# Patient Record
Sex: MALE
[De-identification: names, ages, dates, MRNs, and addresses within clinical notes are randomized per-mention and may not be internally consistent; named-entity substitution may affect disease eponyms.]

---

## 2017-08-30 ENCOUNTER — HOSPITAL ENCOUNTER (INPATIENT)
Dept: HOSPITAL 14 - H.ER | Age: 57
LOS: 6 days | Discharge: HOME | DRG: 100 | End: 2017-09-05
Attending: INTERNAL MEDICINE | Admitting: INTERNAL MEDICINE
Payer: MEDICAID

## 2017-08-30 VITALS — BODY MASS INDEX: 20.1 KG/M2

## 2017-08-30 DIAGNOSIS — E87.1: ICD-10-CM

## 2017-08-30 DIAGNOSIS — I10: ICD-10-CM

## 2017-08-30 DIAGNOSIS — Z23: ICD-10-CM

## 2017-08-30 DIAGNOSIS — Z78.1: ICD-10-CM

## 2017-08-30 DIAGNOSIS — F17.200: ICD-10-CM

## 2017-08-30 DIAGNOSIS — D69.59: ICD-10-CM

## 2017-08-30 DIAGNOSIS — F10.239: ICD-10-CM

## 2017-08-30 DIAGNOSIS — G93.41: ICD-10-CM

## 2017-08-30 DIAGNOSIS — G40.409: Primary | ICD-10-CM

## 2017-08-30 DIAGNOSIS — J32.0: ICD-10-CM

## 2017-08-30 DIAGNOSIS — J32.2: ICD-10-CM

## 2017-08-30 LAB
ALBUMIN/GLOB SERPL: 1.1 {RATIO} (ref 1–2.1)
ALP SERPL-CCNC: 83 U/L (ref 38–126)
ALT SERPL-CCNC: 197 U/L (ref 21–72)
APTT BLD: 31.4 SECONDS (ref 25.6–37.1)
ARTERIAL PATENCY WRIST A: YES
AST SERPL-CCNC: 221 U/L (ref 17–59)
BASOPHILS # BLD AUTO: 0 K/UL (ref 0–0.2)
BASOPHILS NFR BLD: 0.6 % (ref 0–2)
BILIRUB SERPL-MCNC: 0.8 MG/DL (ref 0.2–1.3)
BUN SERPL-MCNC: 9 MG/DL (ref 9–20)
CALCIUM SERPL-MCNC: 9.6 MG/DL (ref 8.4–10.2)
CHLORIDE SERPL-SCNC: 98 MMOL/L (ref 98–107)
CHOLEST SERPL-MCNC: 250 MG/DL (ref 0–199)
CO2 SERPL-SCNC: 12 MMOL/L (ref 22–30)
EOSINOPHIL # BLD AUTO: 0 K/UL (ref 0–0.7)
EOSINOPHIL NFR BLD: 0.3 % (ref 0–4)
ERYTHROCYTE [DISTWIDTH] IN BLOOD BY AUTOMATED COUNT: 13.8 % (ref 11.5–14.5)
ETHANOL SERPL-MCNC: < 10 MG/DL (ref 0–10)
GLOBULIN SER-MCNC: 4.7 GM/DL (ref 2.2–3.9)
GLUCOSE SERPL-MCNC: 283 MG/DL (ref 75–110)
HCO3 BLDA-SCNC: 22.4 MMOL/L (ref 21–28)
HCT VFR BLD CALC: 45.7 % (ref 35–51)
LYMPHOCYTES # BLD AUTO: 1.5 K/UL (ref 1–4.3)
LYMPHOCYTES NFR BLD AUTO: 20.7 % (ref 20–40)
MAGNESIUM SERPL-MCNC: 1.9 MG/DL (ref 1.6–2.3)
MCH RBC QN AUTO: 29.1 PG (ref 27–31)
MCHC RBC AUTO-ENTMCNC: 32.1 G/DL (ref 33–37)
MCV RBC AUTO: 90.5 FL (ref 80–94)
MONOCYTES # BLD: 0.6 K/UL (ref 0–0.8)
MONOCYTES NFR BLD: 8 % (ref 0–10)
NEUTROPHILS # BLD: 5.3 K/UL (ref 1.8–7)
NEUTROPHILS NFR BLD AUTO: 70.4 % (ref 50–75)
NRBC BLD AUTO-RTO: 0 % (ref 0–0)
PH BLDA: 7.29 [PH] (ref 7.35–7.45)
PHOSPHATE SERPL-MCNC: 4.4 MG/DL (ref 2.5–4.5)
PLATELET # BLD: 85 K/UL (ref 130–400)
PMV BLD AUTO: 10.7 FL (ref 7.2–11.7)
PO2 BLDA: 82 MM/HG (ref 80–100)
POTASSIUM SERPL-SCNC: 4 MMOL/L (ref 3.6–5)
PROT SERPL-MCNC: 9.9 G/DL (ref 6.3–8.2)
SODIUM SERPL-SCNC: 136 MMOL/L (ref 132–148)
WBC # BLD AUTO: 7.5 K/UL (ref 4.8–10.8)

## 2017-08-30 RX ADMIN — POTASSIUM CHLORIDE, DEXTROSE MONOHYDRATE AND SODIUM CHLORIDE SCH MLS/HR: 150; 5; 450 INJECTION, SOLUTION INTRAVENOUS at 13:00

## 2017-08-30 RX ADMIN — POTASSIUM CHLORIDE, DEXTROSE MONOHYDRATE AND SODIUM CHLORIDE SCH MLS/HR: 150; 5; 450 INJECTION, SOLUTION INTRAVENOUS at 21:44

## 2017-08-30 NOTE — ED PDOC
HPI:STROKE





- Time


Time: 08:45





- Historian


Historian: EMS





- Chief Complaint


Chief Complaint: Unresponsive





- Onset


Date: 08/30/17


Time: 08:00





- Timing


Timing: Currently Symptomatic





- TPA


Positive for Contraindication: Yes


Reason tPA is not being Administered: unknown time of onset and had seizure





- Notes:


Notes:: 





58 y/o male presents to the emergency department via EMS after found not 

verbally communicative at train station prior to arrival. Limited history 

because patient is not communicating. Patient had seizure while evaluating 

patient, all extremities were shaking and stiffening up, lasted approximately 1 

minute.  Ativan 2mg IV given.  Unclear time of onset when pt. began being 

nonverbal.  





NIHSS Stroke Scale





- Date/Time Evaluation Performed


Date Performed: 08/30/17


Time Performed: 08:42


When Was NIHSS Performed: Baseline





- How Severe is the Stroke


Level of Consciousness: 2=Obtunded


LOC to Questions: 2=Neither correct


LOC to commands: 2=Neither correct


Best Gaze: 2=Forced deviation


Visual: 3=Bilateral


Facial: 0=Normal


Motor Arm - Left: 3=No effort against gravity (falls immediately)


Motor Arm - Right: 3=No effort against gravity (falls immediately)


Motor Leg - Left: 3=No effort against gravity (falls immediately)


Motor Leg - Right: 3=No effort against gravity (falls immediately)


Limb Ataxia: 2=Present both


Sensory: 2=Severe to total loss


Best Language: 3=Mute


Dysarthia: 2=Severe, near unintelligible or worse


Extinction & Inattention (Neglect): 2=Profound neglect(does not recognize own 

hand or orients to one side)


Score: 34





rTPA Inclusion/Exclusion





- Refusal of Treatment


Patient Refused Treatment: No





- Inclusion Criteria for Altepase


Patient is 18 years or Older: Yes


The Clinical Diagnosis of Ischemic Stroke That is Causing a Potentially 

Disabling Neurological Deficit: No


Time of Onset is Well Established to be Less Than 270 Minute Before Treatment 

Would Begin: No


Risk/Benefit Discussed With Patient/Family Member Present: No





- Exclusion Criteria for Altepase


Uncontrolled Hypertension at Time of Treatment (Systolic BP above 185 or 

Diastolic BP above 110 mmHg): Yes





Past Medical History


Reviewed: Historical Data, Nursing Documentation, Vital Signs


Vital Signs: 





 Last Vital Signs











Temp  97.9 F   08/30/17 08:34


 


Pulse  131 H  08/30/17 08:34


 


Resp  20   08/30/17 08:34


 


BP  223/146 H  08/30/17 08:34


 


Pulse Ox  96   08/30/17 08:34














- Family History


Family History: States: Unknown Family Hx





- Home Medications


Home Medications: 


 Ambulatory Orders











 Medication  Instructions  Recorded


 


Unobtainable  08/30/17














- Allergies


Allergies/Adverse Reactions: 


 Allergies











Allergy/AdvReac Type Severity Reaction Status Date / Time


 


Unobtainable Allergy   Verified 08/30/17 08:52














Review of Systems


Review Of Systems: ROS cannot be obtained secondary to pt's inabilty to answer 

questions. (Limited due to patient not verbally communicating)





Physical Exam





- Reviewed


Nursing Documentation Reviewed: Yes


Vital Signs Reviewed: Yes (Limited due to nonverbal communicative male status 

post STROKE)





- Physical Exam


Appears: Positive for: Uncomfortable


Head Exam: Positive for: ATRAUMATIC, NORMAL INSPECTION, NORMOCEPHALIC


Skin: Positive for: Normal Color, Warm, Dry


Eye Exam: Positive for: PERRL, Other (Gaze towards left side)


ENT: Positive for: Normal ENT Inspection


Neck: Positive for: Normal


Cardiovascular/Chest: Positive for: Tachycardia (with regular rhythm).  

Negative for: Murmur


Respiratory: Positive for: Normal Breath Sounds.  Negative for: Accessory 

Muscle Use, Wheezing, Respiratory Distress


Gastrointestinal/Abdominal: Positive for: Soft


Back: Positive for: Normal Inspection


Extremity: Positive for: Other (Not moving extremities on command.  Ocassional 

movement of extrm on his own will but not to command. ).  Negative for: Normal 

ROM


Neurologic/Psych: Positive for: Other (limited; pt. nonverbal and not 

communicating.  )





- Laboratory Results


Result Diagrams: 


 08/30/17 08:50





 08/30/17 08:50


Interpretation Of Abn Labs: CO2 12





- ECG


ECG: Positive for: Interpreted By Me, Viewed By Me


ECG Rhythm: Positive for: Sinus Tachycardia.  Negative for: ST/T Changes


O2 Sat by Pulse Oximetry: 96 (RA)


Pulse Ox Interpretation: Normal





- Radiology


X-Ray: Read By Radiologist


X-Ray Interpretation: Fracture (chronic 5 and 6 rib)





- CT Scan/US


  ** head


Other Rad Studies (CT/US): Read By Radiologist


Other Rad Interpretation: no acute





- Progress


ED Course And Treament: 





956:  Spoke with Dr. Browne neurology.  Wants pt. to be put on keppra with a 

loading dose likely seizure.  He was made aware of all findings and 

presentation. 





1046:  Stable.  Had another seizure, given ativan 2mg.  Spoke with Dr. Campos.  

Will admit tele obs.  Keppra loading pending medication retrieval from 

pharmacy. 





- Critical Care


Total Time (In Min): 30


Documented Critical Care: Time excludes all time spent performint seperately 

billable procedures





Medical Decision Making


Medical Decision Making: 





Time: 08:45


Initial impression: Stroke 


Initial plan:


--Patient had seizure while evaluating patient and given Ativan 2 mg 


--Type and Screen


--Head CT


--EKG


--CMP


--Hemoglobin A1C


--Lipid Panel


--Troponin I 


--CBC w/ diff


--PTT & Prothrombin


--Chest x-ray


--Reevaluation





Time: 09:12


--Chest x-ray read by me and show no acute findings. 





--Head CT


FINDINGS:





HEMORRHAGE:


No intracranial hemorrhage. 





BRAIN:


No mass effect or edema.  No atrophy or chronic microvascular ischemic changes.





VENTRICLES:


Unremarkable. No hydrocephalus. 





CALVARIUM:


Unremarkable.





PARANASAL SINUSES:


Bilateral maxillary and ethmoidal air cell mucosal thickening with probable 

concomitant maxillary sinus retention cysts are present.  Findings suggest 

chronic sinusitis





MASTOID AIR CELLS:


Unremarkable as visualized. No inflammatory changes.





OTHER FINDINGS:


None.





IMPRESSION:


No intracranial hemorrhage or mass effect. No gross infarct appreciated. 





Maxillary and ethmoidal chronic sinusitis changes








Time: 09:30


--Chest x-ray


FINDINGS:





LUNGS:


No active pulmonary disease. Bilateral lung volume hyperinflation. 





PLEURA:


No significant pleural effusion identified, no pneumothorax apparent.





CARDIOVASCULAR:


Normal.





OSSEOUS STRUCTURES:


Thoracic spondylosisMild bulbous right 5th and 6th anterolateral rib superior 

cortices - subacute to chronic rib fractures here probable 





VISUALIZED UPPER ABDOMEN:


Normal.





OTHER FINDINGS:


None.





IMPRESSION:


No active disease. Probable subacute to chronic right anterolateral 5th and 6th 

rib fractures -superior cortices only.








Time: 09:51


--ABG Shock Panel


--Keppra 750 mg/100 mL 











Scribe Attestation:


Documented by Aimee Farrell, acting as a scribe for Bartolome Landry MD.





Provider Scribe Attestation:


All medical record entries made by the Scribe were at my direction and 

personally dictated by me. I have reviewed the chart and agree that the record 

accurately reflects my personal performance of the history, physical exam, 

medical decision making, and the department course for this patient. I have 

also personally directed, reviewed, and agree with the discharge instructions 

and disposition.





Disposition





- Clinical Impression


Clinical Impression: 


 Generalized seizure








- Patient ED Disposition


Is Patient to be Admitted: Yes





- Disposition


Disposition Time: 09:48


Condition: SERIOUS





- Pt Status Changed To:


Hospital Disposition Of: Observation





- POA


Present On Arrival: None

## 2017-08-30 NOTE — CT
PROCEDURE:  CT HEAD WITHOUT CONTRAST.



HISTORY:

code stroke. Patient had seizure in ER 



COMPARISON:

None available. 



TECHNIQUE:

Axial computed tomography images were obtained through the head/brain 

without intravenous contrast.  



Radiation dose:



Total exam DLP = 1206 mGy-cm.



This CT exam was performed using one or more of the following dose 

reduction techniques: Automated exposure control, adjustment of the 

mA and/or kV according to patient size, and/or use of iterative 

reconstruction technique.



FINDINGS:



HEMORRHAGE:

No intracranial hemorrhage. 



BRAIN:

No mass effect or edema.  No atrophy or chronic microvascular 

ischemic changes.



VENTRICLES:

Unremarkable. No hydrocephalus. 



CALVARIUM:

Unremarkable.



PARANASAL SINUSES:

Bilateral maxillary and ethmoidal air cell mucosal thickening with 

probable concomitant maxillary sinus retention cysts are present.  

Findings suggest chronic sinusitis



MASTOID AIR CELLS:

Unremarkable as visualized. No inflammatory changes.



OTHER FINDINGS:

None.



IMPRESSION:

No intracranial hemorrhage or mass effect. No gross infarct 

appreciated. 



Maxillary and ethmoidal chronic sinusitis changes

## 2017-08-30 NOTE — CARD
--------------- APPROVED REPORT --------------





EKG Measurement

Heart [id number]

[place] [id number]

[id number]

[id number]

[id number]



<Conclusion>

Sinus tachycardia

Possible Left atrial enlargement

Left ventricular hypertrophy with repolarization abnormality

Abnormal ECG

## 2017-08-30 NOTE — CP.PCM.HP
History of Present Illness





- History of Present Illness


History of Present Illness: 





CC: unresponsive





HPI: 


57 year old male [place] train station brought into ER, had 

multiple subsequent seizures, was given Ativan and Keppra.  Unknown patient 

medical history, none available on record. Patient continues to be responsive 

to painful stimuli, follows commands, but not verbally responsive at this time. 

Discussed with ER physician and Neurologist, Keppra initiated, for Hypertension 

pt receiving Hydralazine IV around the clock. HD stable, monitor on telemetry. 





ROS: per HPI all other systems reviewed and negative by me





PMSH: unable to obtain at this time


FH/SH/MEDS/ALLERGIES: unobtainable











Present on Admission





- Present on Admission


Any Indicators Present on Admission: No





Past Patient History





- Past Social History


Smoking Status: Unknown If Ever Smoked





- CARDIAC


Hx Cardiac Disorders:  (unable to obtain hx)





- PSYCHIATRIC


Hx Substance Use: No





- ANESTHESIA


Hx Anesthesia: No





Meds


Allergies/Adverse Reactions: 


 Allergies











Allergy/AdvReac Type Severity Reaction Status Date / Time


 


Unobtainable Allergy   Verified 08/30/17 08:52














Physical Exam





- Constitutional


Appears: Non-toxic, No Acute Distress





- Head Exam


Head Exam: ATRAUMATIC, NORMOCEPHALIC





- Eye Exam


Eye Exam: EOMI, PERRL


Pupil Exam: NORMAL ACCOMODATION





- ENT Exam


ENT Exam: Mucous Membranes Moist, Normal Oropharynx





- Respiratory Exam


Respiratory Exam: Clear to Auscultation Bilateral, NORMAL BREATHING PATTERN





- Cardiovascular Exam


Cardiovascular Exam: RRR, +S1, +S2





- GI/Abdominal Exam


GI & Abdominal Exam: Normal Bowel Sounds, Soft.  absent: Mass, Organomegaly





- Extremities Exam


Extremities exam: Positive for: normal capillary refill, pedal pulses present





- Back Exam


Back exam: NORMAL INSPECTION.  absent: rash noted





- Neurological Exam


Neurological exam: Altered, Reflexes Normal





- Psychiatric Exam


Additional comments: 





unobtainable








- Skin


Skin Exam: Dry, Normal Color, Warm





Results





- Vital Signs


Recent Vital Signs: 





 Last Vital Signs











Temp  99.3 F   08/30/17 11:30


 


Pulse  87   08/30/17 12:53


 


Resp  17   08/30/17 12:53


 


BP  174/80 H  08/30/17 12:53


 


Pulse Ox  96   08/30/17 12:40














- Labs


Result Diagrams: 


 08/30/17 08:50





 08/30/17 08:50





Assessment & Plan





- Assessment and Plan (Free Text)


Plan: 





57 year old male [place] train station brought into ER, had 

multiple subsequent seizures, was given Ativan and Keppra.  Unknown patient 

medical history, none available on record. Patient continues to be responsive 

to painful stimuli, follows commands, but not verbally responsive at this time. 

Discussed with ER physician and Neurologist, Keppra initiated, for Hypertension 

pt receiving Hydralazine IV around the clock. HD stable, monitor on telemetry. 





SEIZURES


   Neurology consult: Dr. Browne


   CT head neg for bleed


   Keppra 500 mg IV Q12


   Monitor on Tele


   MRI and EEG when patient more awake/alert


   Consider alcoholism for AST > ALT, 


   NPO for now


   Maintenance fluids


   +guardado


   Swallow eval pending





HTN


   Hypertensive


   continue Hydralazine

## 2017-08-30 NOTE — RAD
HISTORY:

code stroke  



COMPARISON:

No prior. 



FINDINGS:



LUNGS:

No active pulmonary disease. Bilateral lung volume hyperinflation. 



PLEURA:

No significant pleural effusion identified, no pneumothorax apparent.



CARDIOVASCULAR:

Normal.



OSSEOUS STRUCTURES:

Thoracic spondylosisMild bulbous right 5th and 6th anterolateral rib 

superior cortices - subacute to chronic rib fractures here probable 



VISUALIZED UPPER ABDOMEN:

Normal.



OTHER FINDINGS:

None.



IMPRESSION:

No active disease. Probable subacute to chronic right anterolateral 

5th and 6th rib fractures -superior cortices only.

## 2017-08-30 NOTE — CON
DATE:



NEUROLOGY CONSULTATION



REASON FOR CONSULTATION:  Seizures.



HISTORY OF PRESENTING ILLNESS:  The patient is a 57-year-old male who has

been asked for evaluation of seizure.  Patient was found in the train

station confused, not verbally communicative.  Patient while at the

emergency room, had a generalized tonic-clonic seizure.  Patient was given

2 mg of Ativan.  The patient is unable to give history as he is still

sleepy and waking up.  Patient was given Keppra in the emergency room.



REVIEW OF SYSTEM:  Denies any headache, dizziness, chest pain, palpitation,

constipation, diarrhea.



PAST MEDICAL HISTORY:  Unknown.



MEDICATIONS AT HOME:  Unknown.



ALLERGIES:  Unknown.



SOCIAL HISTORY:  Unknown.



FAMILY HISTORY:  Unknown.



PHYSICAL EXAMINATION

GENERAL:  The patient is a middle-aged male lying on the bed in no acute

distress.

VITAL SIGNS:  His blood pressure is 174/80, heart rate is 87 per minute,

breathing at the rate of 16 per minute, temperature is 99.3 degrees

Fahrenheit.

HEENT:   Head is normocephalic and atraumatic.

NECK:  Supple.  There are no carotid bruits.

LUNGS:  Clear.

CARDIOVASCULAR:  S1 and S2 audible.  No murmurs.

ABDOMEN:  Soft and nontender.  Bowel sounds present.

NEUROLOGICAL:  Mental status; patient is sleepy, but arousable.  He follows

simple commands.  He is not verbalizing.  Cranial nerve examination; pupils

are 3 mm bilaterally, reactive to light.  Visual fields are full. 

Extraocular movements are intact.  There is no facial asymmetry.  He is

moving all 4 extremities symmetrically.  Reflexes are 1+ and symmetrical. 

Plantars are downgoing bilaterally.  Gait is deferred at the moment.



LABORATORY DATA:  Labs reviewed shows WBC 7.5, hemoglobin 14.7, hematocrit

45.7 and platelets of 85,000.  His INR is 1.1.  Sodium is 136, potassium

4.0, chloride of 98, carbon dioxide 112, BUN of 9, creatinine of 0.8, and

glucose of 283.  His urine toxicology screen is negative.



IMPRESSION:  New onset of seizures.



RECOMMENDATION:

1.  Patient to have MRI of the brain without contrast.

2.  Patient also to have an electroencephalogram.

3.  Patient to be continued on IV Keppra 500 mg every 12 hours and once he

is more awake, to be switched to p.o. Keppra.

4.  Patient had a CT scan of the head done, which showed no intracranial

hemorrhage or mass effect.  No gross infarct appreciated.

5.  Continue with supportive care and seizure precautions.



Thank you for the opportunity to participate in the care of this patient.





__________________________________________

Perla Browne MD





DD:  08/30/2017 13:16:56

DT:  08/30/2017 20:15:35

Job # [id number]

## 2017-08-31 LAB
BUN SERPL-MCNC: 11 MG/DL (ref 9–20)
CALCIUM SERPL-MCNC: 9.5 MG/DL (ref 8.4–10.2)
CHLORIDE SERPL-SCNC: 97 MMOL/L (ref 98–107)
CO2 SERPL-SCNC: 22 MMOL/L (ref 22–30)
ERYTHROCYTE [DISTWIDTH] IN BLOOD BY AUTOMATED COUNT: 13.6 % (ref 11.5–14.5)
GLUCOSE SERPL-MCNC: 149 MG/DL (ref 75–110)
HCT VFR BLD CALC: 41.5 % (ref 35–51)
MCH RBC QN AUTO: 29.3 PG (ref 27–31)
MCHC RBC AUTO-ENTMCNC: 33.3 G/DL (ref 33–37)
MCV RBC AUTO: 88 FL (ref 80–94)
PLATELET # BLD: 79 K/UL (ref 130–400)
POTASSIUM SERPL-SCNC: 4 MMOL/L (ref 3.6–5)
SODIUM SERPL-SCNC: 130 MMOL/L (ref 132–148)
WBC # BLD AUTO: 8.5 K/UL (ref 4.8–10.8)

## 2017-08-31 RX ADMIN — DEXMEDETOMIDINE HYDROCHLORIDE ONE MLS/HR: 100 INJECTION, SOLUTION, CONCENTRATE INTRAVENOUS at 08:17

## 2017-08-31 RX ADMIN — DEXMEDETOMIDINE HYDROCHLORIDE ONE MLS/HR: 100 INJECTION, SOLUTION, CONCENTRATE INTRAVENOUS at 20:10

## 2017-08-31 RX ADMIN — ENOXAPARIN SODIUM SCH MG: 40 INJECTION SUBCUTANEOUS at 09:17

## 2017-08-31 RX ADMIN — POTASSIUM CHLORIDE, DEXTROSE MONOHYDRATE AND SODIUM CHLORIDE SCH MLS/HR: 150; 5; 450 INJECTION, SOLUTION INTRAVENOUS at 05:45

## 2017-08-31 NOTE — CP.PCM.PN
Subjective





- Date & Time of Evaluation


Date of Evaluation: 08/31/17


Time of Evaluation: 07:00





- Subjective


Subjective: 


Patient was seen and evaluated bedside. Appears to be restless , confused  with 

tremors.


As per records patient had episodes of agitation and disorientation  overnight 

requiring medical sedation with haldol and ativan with minimal benefit.


At present on 1;1 observation for safety , difficult to redirect.


As per daughter patient has long history of ETOH abuse  


/73 HR 85, afebrile , saturating 100 % in RA 


Na 130 K 4 Hgb 13 plt 79 k WBC 8.5











Objective





- Vital Signs/Intake and Output


Vital Signs (last 24 hours): 


 











Temp Pulse Resp BP Pulse Ox


 


 98.6 F   85   22   168/73 H  99 


 


 08/31/17 05:00  08/31/17 06:00  08/31/17 06:00  08/31/17 06:00  08/30/17 20:00








Intake and Output: 


 











 08/31/17 08/31/17





 06:59 18:59


 


Intake Total 1350 


 


Output Total 600 


 


Balance 750 














- Medications


Medications: 


 Current Medications





Enoxaparin Sodium (Lovenox)  40 mg SC DAILY GOLDEN


   PRN Reason: Protocol


Folic Acid (Folic Acid)  1 mg PO DAILY Replaced by [facility]


Hydralazine HCl (Apresoline)  20 mg IV Q6 Replaced by [facility]


   Last Admin: 08/31/17 04:09 Dose:  20 mg


Levetiracetam 500 mg/ Sodium (Chloride)  105 mls @ 210 mls/hr IVPB Q12 GOLDEN


   Last Admin: 08/30/17 21:42 Dose:  210 mls/hr


Multivitamins/Vitamin C 10 ml/Thiamine HCl 100 mg/ Folic Acid 1 mg/ Sodium 

Chloride  1,011.2 mls @ 175 mls/hr IV .Q5H47M ONE


   Stop: 08/31/17 13:31


Dexmedetomidine HCl 400 mcg/ (Sodium Chloride)  100 mls @ 3 mls/hr IV .Q24H ONE

; 0.2 MCG/KG/HR


   PRN Reason: Protocol


   Stop: 08/31/17 07:45


Sodium Chloride (Sodium Chloride 0.9%)  1,000 mls @ 175 mls/hr IV .Q5H43M Replaced by [facility]


   Stop: 09/01/17 07:46


Lorazepam (Ativan)  1 mg PO Q3 PRN


   PRN Reason: withdrawal symptoms


   Last Admin: 08/31/17 00:19 Dose:  1 mg


Thiamine HCl (Vitamin B1 Tab)  100 mg PO DAILY GOLDEN











- Labs


Labs: 


 





 08/31/17 05:00 





 08/31/17 05:00 





 











PT  11.3 Seconds (9.8-13.1)   08/30/17  08:50    


 


INR  1.1  (0.9-1.2)   08/30/17  08:50    


 


APTT  31.4 Seconds (25.6-37.1)   08/30/17  08:50    














- Constitutional


Appears: Combative (restless), Confused, Other





- Head Exam


Head Exam: ATRAUMATIC, NORMAL INSPECTION, NORMOCEPHALIC





- Eye Exam


Eye Exam: EOMI, Normal appearance, PERRL


Pupil Exam: NORMAL ACCOMODATION





- ENT Exam


ENT Exam: Mucous Membranes Moist, Normal Exam





- Neck Exam


Neck Exam: Normal Inspection





- Respiratory Exam


Respiratory Exam: Clear to Ausculation Bilateral, NORMAL BREATHING PATTERN.  

absent: Rales, Rhonchi, Wheezes





- Cardiovascular Exam


Cardiovascular Exam: REGULAR RHYTHM, RRR, +S1, +S2.  absent: JVD





- GI/Abdominal Exam


GI & Abdominal Exam: Soft, Normal Bowel Sounds.  absent: Distended, Guarding, 

Tenderness, Rebound





- Rectal Exam


Rectal Exam: Deferred





- Extremities Exam


Extremities Exam: Full ROM, Normal Capillary Refill, Normal Inspection.  absent

: Calf Tenderness, Pedal Edema





- Back Exam


Back Exam: NORMAL INSPECTION





- Neurological Exam


Neurological Exam: Alert, Awake


Additional comments: 





confused , restless, agitated 





- Psychiatric Exam


Psychiatric exam: Agitated, Anxious





- Skin


Skin Exam: Dry, Normal Color, Warm





Assessment and Plan





- Assessment and Plan (Free Text)


Assessment: 


57 year old male [place] train [place] brought into ER, had 

multiple subsequent seizures, was given Ativan and Keppra.  As per family 

patient has history of ETOH abuse. Initially code stroke was called and CT head 

showed no acute pathology. Neurology was consulted . Patient became very 

agitated , restless and confused overnight, placed on 1:1 for safety and given 

haldol and Ativan for possible DT-s  . transferred to ICU for close monitoring 

and started on presedex 





1. ETOH abuse and withdrawal  / impending DT-s / metabolic encephalopathy 


As per family patient has long history of ETOh abuse


Restless,confused and tremulous most likely secondary to ETOH withdrawal


ETOH levels 0, UDOA negative


CT head showed no acute pathology


Seizure / withdrawal precautions


Transferred to ICU for close monitoring and started Presedex drip and Arivan PRN


Keep 1:1 for safety


Continue Thiamine, folic acid , MVI 


Neurology consult on board 


EEG showed no seizure activity . will follow up MRI brain 








 2.New onset seizure 


Most likely related to ETOH withdrawal 


CT head showed no acute pathology


EEG showed no  seizure like activity


continue MVI/Thiamine/ folic acid / IVF


Seziure precautions


Continue Keppra and Ativan PRN


Follow up MRI


Neurology on consult


Will start Pt as soon as stable








3.Nicotine addiction


started nicotine patch





4.HTN


uncontrolled 


Continue hydralazine IV 





5. Transaminitis


Most likely secondary to ETOH abuse 








6. DVt prophylaxis


SCD and lovenox

## 2017-08-31 NOTE — PN
NEUROLOGY PROGRESS NOTE



DATE:



SUBJECTIVE:  The patient is lying on the bed in no acute distress.



PHYSICAL EXAMINATION:

VITAL SIGNS:  Blood pressure is 150/79, heart rate is 93 per minute,

beating at the rate of 16 per minute, and temperature is 98.5 degrees

Fahrenheit.

HEENT:  Head is normocephalic and atraumatic.

NECK:  Supple.  There are no carotid bruits.

LUNGS:  Clear.

CARDIOVASCULAR:  S1 and S2 audible.  No murmurs.

ABDOMEN:  Soft and nontender.  Bowel sounds are present.

NEUROLOGIC:  Mini-mental status; the patient is sleepy, but easily

arousable.  He follows some simple commands.  He knows he is in the

hospital.  He does not know the name of the president.  Cranial nerve

examination; pupils are 3 mm bilaterally reactive to light.  Visual fields

are full.  Extraocular movements are intact.  There is no facial asymmetry.

He is moving all 4 extremities symmetrically.  Plantars are downgoing

bilaterally.



LABORATORY DATA:  Reviewed and shows sodium is 130, potassium is 4.0,

chloride is 97, carbon dioxide content of 22, BUN 11, creatinine 0.5, and

glucose of 149.  His WBC is 8.5, hemoglobin 13.8, hematocrit 41.5, and

platelets of 79.



IMPRESSION:  New onset of seizure questionable secondary to alcohol

withdrawal.



RECOMMENDATIONS:

1.  Patient is going to have MRI of the brain without contrast.

2.  Patient had an electroencephalogram done, which shows no epileptogenic

activity.

3.  Patient to be continued on Keppra.

4.  Patient is on lorazepam for possible alcohol withdrawal, which may be

continued.

5.  I agree with thiamine and multivitamin.

6.  Please continue supportive care and other treatment.





















Thank you for the opportunity to participate in the care of this patient.





__________________________________________

Perla Browne MD





DD:  08/31/2017 10:22:25

DT:  08/31/2017 11:13:40

Job # [id number]

## 2017-08-31 NOTE — CP.CCUPN
CCU Subjective





- Physician Review


Subjective (Free Text): 


57M upgraded to ICU this AM from telemetry bed for agitation, disorientation 

and probable ETOH withdrawal syndrome. Admitted yesterday after post Code 

Stroke eval with negative CT Brain, found to have had seizure activity and 

accelerated HTN, started on IV Keppra and ATC IV Hydralazine. Overnight, he was 

placed on 1:1 supervision for agitative episodes with minimal response to PRN 

Haldol. Appears disoriented now and asking to go to hospital, alternating 

speaking in both Citizen of [place] and English. Otherwise in no acute distress, does not 

appear cyanotic, diaphoretic, not hypoxemic. No recurrent seizure activity 

reported by nursing. Evangelista in place but showing bloody urine. 





ROS:  unobtainable 2 agitation and disorientation. 





PMSFH:  All Nursing and physician documentation reviewed to date; no new 

pertinent info noted relevant to current medical problems.





MAJOR PROBLEMS:


1.   Seizure Disorder 2 ETOH withdrawal


2.   Chronic Alcoholism


3.   Accelerated HTN


4.    Hyponatremia








PLAN:


1.   No effective sedation accomplished with Haldol or BZDPs, will trial 

Dexmedetomidine. 


2.   Airway reflexes intact, no need for any assisted breathing support.


3.   Increase IVF hydration with NSS, banana bag. Follow serial serum Na 

levels. 


4.   1:1 supervision


5.   Thiamine / folate supplements.


6.   Repeat CT Brain if neuro status worsens.


7.   Ongoing Keppra. 


8.   Need more medical database information if obtainable, no family noted as 

of yet. 





CCU Objective





- Vital Signs / Intake & Output


Vital Signs (Last 4 hours): 





afebrile, no fever spikes, -170, HR 79, RR 22, SPO2 98% on RA.





- Physical Exam


Physical Exam Limitations: Positive for: Altered Mental Status


Head: Positive for: Atraumatic, Normocephalic


Pupils: Positive for: PERRL


Extroacular Muscles: Positive for: EOMI.  Negative for: Gaze Palsy


Conjunctiva: Positive for: Normal.  Negative for: Injected, Icteric


Mouth: Positive for: Moist Mucous Membranes


Neck: Positive for: Normal Range of Motion.  Negative for: JVD, Lymphadenopathy


Respiratory/Chest: Positive for: Clear to Auscultation.  Negative for: 

Accessory Muscle Use, Wheezes


Cardiovascular: Positive for: Regular Rate and Rhythm, Tachycardic


Abdomen: Positive for: Normal Bowel Sounds.  Negative for: Tenderness, 

Distention


Lower Extremity: Positive for: Normal Inspection, NORMAL PULSES.  Negative for: 

Edema, CALF TENDERNESS, Cyanosis


Neurological: Positive for: Motor Func Grossly Intact, Normal Sensory Function


Skin: Positive for: Warm.  Negative for: Rashes


Psychiatric: Positive for: Alert, Agitated





- Medications


Active Medications: 


Active Medications











Generic Name Dose Route Start Last Admin





  Trade Name Freq  PRN Reason Stop Dose Admin


 


Enoxaparin Sodium  40 mg  08/31/17 09:00  





  Lovenox  SC   





  DAILY [facility]   





  Protocol   


 


Folic Acid  1 mg  08/31/17 09:00  





  Folic Acid  PO   





  DAILY [facility]   


 


Hydralazine HCl  20 mg  08/30/17 16:00  08/31/17 04:09





  Apresoline  IV   20 mg





  Q6 [facility]   Administration


 


Levetiracetam 500 mg/ Sodium  105 mls @ 210 mls/hr  08/30/17 21:00  08/30/17 21:

42





  Chloride  IVPB   210 mls/hr





  Q12 [facility]   Administration


 


Multivitamins/Vitamin C 10 ml/  1,011.2 mls @ 175 mls/hr  08/31/17 07:45  





Thiamine HCl 100 mg/ Folic  IV  08/31/17 13:31  





Acid 1 mg/ Sodium Chloride  .Q5H47M ONE   


 


Sodium Chloride  1,000 mls @ 175 mls/hr  08/31/17 08:00  08/31/17 08:12





  Sodium Chloride 0.9%  IV  09/01/17 07:46  175 mls/hr





  .Q5H43M [facility]   Administration


 


Dexmedetomidine HCl 400 mcg/  100 mls @ 3 mls/hr  08/31/17 08:00  08/31/17 08:17





  Sodium Chloride  IV  09/01/17 07:59  0.2 mcg/kg/hr





  .Q24H ONE   3 mls/hr





  Protocol   Administration





  0.2 MCG/KG/HR   


 


Lorazepam  1 mg  08/30/17 18:22  08/31/17 00:19





  Ativan  PO   1 mg





  Q3 PRN   Administration





  withdrawal symptoms   


 


Thiamine HCl  100 mg  08/31/17 09:00  





  Vitamin B1 Tab  PO   





  DAILY [facility]   














- Patient Studies


Fingerstick Blood Sugar Results: 263





Review of Systems





- Review of Systems


Systems not reviewed;Unavailable: Uncooperative





Critical Care Progress Note





- Extremities/Vascular


Does the Patient have a Central Venous Catheter?: No


Does the Patient need a Central Venous Catheter?: No


Does the Patient have a Evangelista Catheter?: Yes


Does the Patient need a Evangelista Catheter?: No





- Prophylaxis GI


Prophylaxis GI: Not Indicated





- Prophylaxis DVT


Prophylaxis DVT: Lovenox

## 2017-09-01 LAB
BUN SERPL-MCNC: 11 MG/DL (ref 9–20)
CALCIUM SERPL-MCNC: 8.8 MG/DL (ref 8.4–10.2)
CHLORIDE SERPL-SCNC: 98 MMOL/L (ref 98–107)
CO2 SERPL-SCNC: 24 MMOL/L (ref 22–30)
ERYTHROCYTE [DISTWIDTH] IN BLOOD BY AUTOMATED COUNT: 13.6 % (ref 11.5–14.5)
GLUCOSE SERPL-MCNC: 116 MG/DL (ref 75–110)
HCT VFR BLD CALC: 39.8 % (ref 35–51)
MCH RBC QN AUTO: 28.9 PG (ref 27–31)
MCHC RBC AUTO-ENTMCNC: 32.4 G/DL (ref 33–37)
MCV RBC AUTO: 89.3 FL (ref 80–94)
PLATELET # BLD: 73 K/UL (ref 130–400)
POTASSIUM SERPL-SCNC: 3.8 MMOL/L (ref 3.6–5)
SODIUM SERPL-SCNC: 130 MMOL/L (ref 132–148)
WBC # BLD AUTO: 5.3 K/UL (ref 4.8–10.8)

## 2017-09-01 RX ADMIN — DEXMEDETOMIDINE HYDROCHLORIDE ONE MLS/HR: 100 INJECTION, SOLUTION, CONCENTRATE INTRAVENOUS at 04:22

## 2017-09-01 RX ADMIN — ENOXAPARIN SODIUM SCH MG: 40 INJECTION SUBCUTANEOUS at 08:56

## 2017-09-01 NOTE — CP.PCM.PN
Subjective





- Date & Time of Evaluation


Date of Evaluation: 09/01/17


Time of Evaluation: 10:30





- Subjective


Subjective: 





No fever


Pt is confused and disoriented


episodes of agitation, pulls out IV line


on wrist restraint


on 1:1 for safety 


some tremulousness


no further seizure episode on Precedex





Objective





- Vital Signs/Intake and Output


Vital Signs (last 24 hours): 


 











Temp Pulse Resp BP Pulse Ox


 


 98.9 F   54 L  19   144/72   96 


 


 09/01/17 08:07  09/01/17 05:00  09/01/17 05:00  09/01/17 05:00  09/01/17 05:00








Intake and Output: 


 











 09/01/17 09/01/17





 06:59 18:59


 


Intake Total 2348 120


 


Output Total 300 


 


Balance 2048 120














- Medications


Medications: 


 Current Medications





Enoxaparin Sodium (Lovenox)  40 mg SC DAILY [facility]


   PRN Reason: Protocol


   Last Admin: 09/01/17 08:56 Dose:  40 mg


Famotidine (Pepcid)  20 mg IVP Q12 [facility]


Folic Acid (Folic Acid)  1 mg PO DAILY [facility]


   Last Admin: 09/01/17 08:57 Dose:  1 mg


Hydralazine HCl (Apresoline)  20 mg IV Q6 [facility]


   Last Admin: 08/31/17 21:09 Dose:  20 mg


Levetiracetam 500 mg/ Sodium (Chloride)  105 mls @ 210 mls/hr IVPB Q12 [facility]


   Last Admin: 09/01/17 08:51 Dose:  210 mls/hr


Lorazepam (Ativan)  1 mg PO Q3 PRN


   PRN Reason: withdrawal symptoms


   Last Admin: 08/31/17 00:19 Dose:  1 mg


Lorazepam (Ativan)  2 mg IVP Q4 PRN


   PRN Reason: Agitation


   Last Admin: 08/31/17 13:20 Dose:  2 mg


Nicotine (Nicoderm Cq)  1 patch TD DAILY [facility]


   Last Admin: 09/01/17 08:56 Dose:  1 patch


Thiamine HCl (Vitamin B1 Tab)  100 mg PO DAILY [facility]


   Last Admin: 09/01/17 08:56 Dose:  100 mg











- Labs


Labs: 


 





 09/01/17 04:20 





 09/01/17 04:20 





 











PT  11.3 Seconds (9.8-13.1)   08/30/17  08:50    


 


INR  1.1  (0.9-1.2)   08/30/17  08:50    


 


APTT  31.4 Seconds (25.6-37.1)   08/30/17  08:50    














- Constitutional


Appears: Unkempt, Older Than Stated Age, Agitated, Confused, Chronically Ill





- Head Exam


Head Exam: NORMAL INSPECTION, NORMOCEPHALIC





- Eye Exam


Eye Exam: EOMI, Normal appearance





- ENT Exam


ENT Exam: Mucous Membranes Dry, Normal External Ear Exam





- Neck Exam


Neck Exam: Full ROM.  absent: Meningismus





- Respiratory Exam


Respiratory Exam: NORMAL BREATHING PATTERN.  absent: Respiratory Distress





- Cardiovascular Exam


Cardiovascular Exam: REGULAR RHYTHM, +S1, +S2





- GI/Abdominal Exam


GI & Abdominal Exam: Soft, Normal Bowel Sounds.  absent: Tenderness





- Extremities Exam


Extremities Exam: Normal Capillary Refill.  absent: Calf Tenderness, Pedal Edema





- Neurological Exam


Neurological Exam: Alert, Awake


Additional comments: 





confused, disoriented


moves all extremities





- Psychiatric Exam


Psychiatric exam: Agitated





- Skin


Skin Exam: Dry, Normal Color, Warm





Assessment and Plan





- Assessment and Plan (Free Text)


Assessment: 





57 [place], had 

multiple subsequent seizures, was given Ativan and Keppra.  As per family 

patient has history of ETOH abuse. Initially code stroke was called and CT head 

showed no acute pathology.  Neurology was consulted . Patient became very 

agitated , restless and confused , placed on 1:1 for safety and given haldol 

and Ativan for possible DT-s  . Transferred to ICU for close monitoring and 

started on Precedex.








1. ETOH abuse and withdrawal  / impending DT-s / metabolic encephalopathy 


As per family patient has long history of ETOh abuse


Restless,confused and tremulous most likely secondary to ETOH withdrawal


ETOH levels 0, UDOA negative


CT head showed no acute pathology


Seizure / withdrawal precautions


Transferred to ICU for close monitoring and started Precedex drip and Ativan PRN


Keep 1:1 for safety


Continue Thiamine, folic acid , MVI 


Neurology consult on board 


EEG showed no seizure activity 


MRI of brain pending - unable to do as pt agitated





 2.New onset seizure 


Most likely related to ETOH withdrawal 


CT head showed no acute pathology


EEG showed no  seizure like activity


continue MVI/Thiamine/ folic acid / IVF


Seziure precautions


Continue Keppra and Ativan PRN


Follow up MRI


Neurology on consult


Will start PT as soon as stable








3.Nicotine addiction


started nicotine patch





4.HTN


uncontrolled 


Continue hydralazine IV 





5. Transaminitis


Most likely secondary to ETOH abuse 





6. Thrombocytopenia likely BM suppression by ETOH


monitor 





7. DVt prophylaxis


SCD 


d/c Lovenox as Platelet is trending down

## 2017-09-01 NOTE — EEG
DATE:



ELECTROENCEPHALOGRAM REPORT



INTRODUCTION:  This is a digitally recorded EEG monitoring using standard

EEG montages.  Background rhythm of the EEG shows a background activity of

9 Hz to 10 Hz alpha activity in parietooccipital region.  The EEG activity

is bilaterally symmetrical and synchronized.  There is attenuation of the

background activity on eye opening.  Significant amount of myogenic

artifact noted throughout this EEG recording.  Abnormal potentials, no

spike, sharp waves, or focal plane was seen.  Photic stimulation and

hyperventilation.  Photic stimulation did not reveal any abnormality and

hyperventilation was not performed.



IMPRESSION:  A normal EEG.  Because of the presence of significant myogenic

artifact the repeat EEG suggested.  It is clinically warranted.



Thank you for the opportunity to participate in the care of this patient.





__________________________________________

Perla Browne MD





DD:  08/31/2017 9:42:10

DT:  08/31/2017 14:36:39

Job # [id number]

## 2017-09-01 NOTE — CP.CCUPN
CCU Subjective





- Physician Review


Subjective (Free Text): 


Easily awakens and has still been intermittently agitated, remains disoriented 

to place and time, denies any new discomfort, no fever spikes, less tachycardic 

today, On Precedex at 0.9 mcg/kg/hr.  No recurrent seizures. 





ROS:  unobtainable 2  disorientation. 





PMSFH:  All Nursing and physician documentation reviewed to date; no new 

pertinent info noted relevant to current medical problems.





MAJOR PROBLEMS:


1.   Seizure Disorder 2 ETOH withdrawal


2.   Chronic Alcoholism


3.   Accelerated HTN


4.    Hyponatremia








PLAN:


1.   Continue  Dexmedetomidine for another 24H. MAY INCREASE DOSE TO 1.5MCG/KG/

HR IF NECESSARY TO KEEP  AT RASS NEG 2.  Ativan added to delirium regimen for 

ETOH withdrawal symptoms.


2.   Airway reflexes intact, no need for any assisted breathing support.


3.   Mantain IVF hydration with NSS, banana bag. Follow serial serum Na levels. 


4.   1:1 supervision


5.   Thiamine / folate supplements.


6.   Repeat CT Brain if neuro status worsens.


7.   Ongoing Keppra. 


8.   Need more medical database information if obtainable, no family noted as 

of yet. 





CCU Objective





- Vital Signs / Intake & Output


Vital Signs (Last 4 hours): 


Vital Signs











  Temp Pulse Resp BP Pulse Ox


 


 09/01/17 08:07  98.9 F    


 


 09/01/17 05:00  98.2 F  54 L  19  144/72  96











Intake and Output (Last 8hrs): 


 Intake & Output











 08/31/17 09/01/17 09/01/17





 22:59 06:59 14:59


 


Intake Total 2457 2221 


 


Output Total 100 300 


 


Balance 2357 1921 


 


Intake:   


 


  IV 1877 1981 


 


  Intake, Piggyback 100  


 


  Oral 480 240 


 


Output:   


 


  Urine 100 300 


 


    Urethral (Evangelista) 100  


 


    Urine, Voided  300 














- Physical Exam


Head: Positive for: Atraumatic, Normocephalic


Pupils: Positive for: PERRL


Extroacular Muscles: Positive for: EOMI.  Negative for: Gaze Palsy


Conjunctiva: Positive for: Normal.  Negative for: Injected, Icteric


Mouth: Positive for: Moist Mucous Membranes


Neck: Positive for: Normal Range of Motion.  Negative for: JVD, Lymphadenopathy


Respiratory/Chest: Positive for: Clear to Auscultation.  Negative for: 

Accessory Muscle Use, Wheezes


Cardiovascular: Positive for: Regular Rate and Rhythm, Tachycardic


Abdomen: Positive for: Normal Bowel Sounds.  Negative for: Tenderness, 

Distention


Lower Extremity: Positive for: Normal Inspection, NORMAL PULSES.  Negative for: 

Edema, CALF TENDERNESS, Cyanosis


Neurological: Positive for: Motor Func Grossly Intact, Normal Sensory Function


Skin: Positive for: Warm.  Negative for: Rashes


Psychiatric: Positive for: Alert, Agitated





- Medications


Active Medications: 


Active Medications











Generic Name Dose Route Start Last Admin





  Trade Name Freq  PRN Reason Stop Dose Admin


 


Enoxaparin Sodium  40 mg  08/31/17 09:00  08/31/17 09:17





  Lovenox  SC   40 mg





  DAILY [facility]   Administration





  Protocol   


 


Folic Acid  1 mg  08/31/17 09:00  08/31/17 15:12





  Folic Acid  PO   Not Given





  DAILY [facility]   


 


Hydralazine HCl  20 mg  08/30/17 16:00  08/31/17 21:09





  Apresoline  IV   20 mg





  Q6 [facility]   Administration


 


Levetiracetam 500 mg/ Sodium  105 mls @ 210 mls/hr  08/30/17 21:00  08/31/17 20:

27





  Chloride  IVPB   210 mls/hr





  Q12 [facility]   Administration


 


Lorazepam  1 mg  08/30/17 18:22  08/31/17 00:19





  Ativan  PO   1 mg





  Q3 PRN   Administration





  withdrawal symptoms   


 


Lorazepam  2 mg  08/31/17 12:58  08/31/17 13:20





  Ativan  IVP   2 mg





  Q4 PRN   Administration





  Agitation   


 


Nicotine  1 patch  08/31/17 11:45  08/31/17 12:56





  Nicoderm Cq  TD   1 patch





  DAILY [facility]   Administration


 


Thiamine HCl  100 mg  08/31/17 09:00  08/31/17 15:12





  Vitamin B1 Tab  PO   Not Given





  DAILY [facility]   














- Patient Studies


Lab Studies: 


 Lab Studies











  09/01/17 09/01/17 08/31/17 Range/Units





  04:20 04:20 21:41 


 


WBC   5.3   (4.8-10.8)  K/uL


 


RBC   4.45   (4.40-5.90)  Mil/uL


 


Hgb   12.9   (12.0-18.0)  g/dL


 


Hct   39.8   (35.0-51.0)  %


 


MCV   89.3   (80.0-94.0)  fl


 


MCH   28.9   (27.0-31.0)  pg


 


MCHC   32.4 L   (33.0-37.0)  g/dL


 


RDW   13.6   (11.5-14.5)  %


 


Plt Count   73 L   (130-400)  K/uL


 


Sodium  130 L    (132-148)  mmol/l


 


Potassium  3.8    (3.6-5.0)  MMOL/L


 


Chloride  98    ()  mmol/L


 


Carbon Dioxide  24    (22-30)  mmol/L


 


Anion Gap  12    (10-20)  


 


BUN  11    (9-20)  mg/dl


 


Creatinine  0.6 L    (0.8-1.5)  mg/dL


 


Est GFR (African Amer)  > 60    


 


Est GFR (Non-Af Amer)  > 60    


 


POC Glucose (mg/dL)    120 H  ()  mg/dL


 


Random Glucose  116 H    ()  mg/dL


 


Calcium  8.8    (8.4-10.2)  mg/dL














  08/31/17 08/31/17 Range/Units





  15:57 11:03 


 


WBC    (4.8-10.8)  K/uL


 


RBC    (4.40-5.90)  Mil/uL


 


Hgb    (12.0-18.0)  g/dL


 


Hct    (35.0-51.0)  %


 


MCV    (80.0-94.0)  fl


 


MCH    (27.0-31.0)  pg


 


MCHC    (33.0-37.0)  g/dL


 


RDW    (11.5-14.5)  %


 


Plt Count    (130-400)  K/uL


 


Sodium    (132-148)  mmol/l


 


Potassium    (3.6-5.0)  MMOL/L


 


Chloride    ()  mmol/L


 


Carbon Dioxide    (22-30)  mmol/L


 


Anion Gap    (10-20)  


 


BUN    (9-20)  mg/dl


 


Creatinine    (0.8-1.5)  mg/dL


 


Est GFR (African Amer)    


 


Est GFR (Non-Af Amer)    


 


POC Glucose (mg/dL)  111 H  112 H  ()  mg/dL


 


Random Glucose    ()  mg/dL


 


Calcium    (8.4-10.2)  mg/dL








 Laboratory Results - last 24 hr











  08/31/17 08/31/17 08/31/17





  11:03 15:57 21:41


 


WBC   


 


RBC   


 


Hgb   


 


Hct   


 


MCV   


 


MCH   


 


MCHC   


 


RDW   


 


Plt Count   


 


Sodium   


 


Potassium   


 


Chloride   


 


Carbon Dioxide   


 


Anion Gap   


 


BUN   


 


Creatinine   


 


Est GFR ( Amer)   


 


Est GFR (Non-Af Amer)   


 


POC Glucose (mg/dL)  112 H  111 H  120 H


 


Random Glucose   


 


Calcium   














  09/01/17 09/01/17





  04:20 04:20


 


WBC  5.3 


 


RBC  4.45 


 


Hgb  12.9 


 


Hct  39.8 


 


MCV  89.3 


 


MCH  28.9 


 


MCHC  32.4 L 


 


RDW  13.6 


 


Plt Count  73 L 


 


Sodium   130 L


 


Potassium   3.8


 


Chloride   98


 


Carbon Dioxide   24


 


Anion Gap   12


 


BUN   11


 


Creatinine   0.6 L


 


Est GFR ( Amer)   > 60


 


Est GFR (Non-Af Amer)   > 60


 


POC Glucose (mg/dL)  


 


Random Glucose   116 H


 


Calcium   8.8











Fingerstick Blood Sugar Results: 99





Review of Systems





- Review of Systems


All systems: reviewed and no additional remarkable complaints except (as above)





Critical Care Progress Note





- Prophylaxis GI


Prophylaxis GI: Pepsid





- Prophylaxis DVT


Prophylaxis DVT: Lovenox

## 2017-09-02 LAB
ALBUMIN/GLOB SERPL: 1 {RATIO} (ref 1–2.1)
ALP SERPL-CCNC: 53 U/L (ref 38–126)
ALT SERPL-CCNC: 417 U/L (ref 21–72)
AST SERPL-CCNC: 358 U/L (ref 17–59)
BASOPHILS # BLD AUTO: 0 K/UL (ref 0–0.2)
BASOPHILS NFR BLD: 0.9 % (ref 0–2)
BILIRUB SERPL-MCNC: 1.9 MG/DL (ref 0.2–1.3)
BUN SERPL-MCNC: 9 MG/DL (ref 9–20)
CALCIUM SERPL-MCNC: 8.6 MG/DL (ref 8.4–10.2)
CHLORIDE SERPL-SCNC: 98 MMOL/L (ref 98–107)
CO2 SERPL-SCNC: 22 MMOL/L (ref 22–30)
EOSINOPHIL # BLD AUTO: 0 K/UL (ref 0–0.7)
EOSINOPHIL NFR BLD: 0.3 % (ref 0–4)
ERYTHROCYTE [DISTWIDTH] IN BLOOD BY AUTOMATED COUNT: 13.7 % (ref 11.5–14.5)
GLOBULIN SER-MCNC: 3.6 GM/DL (ref 2.2–3.9)
GLUCOSE SERPL-MCNC: 108 MG/DL (ref 75–110)
HCT VFR BLD CALC: 40.9 % (ref 35–51)
LYMPHOCYTES # BLD AUTO: 1.2 K/UL (ref 1–4.3)
LYMPHOCYTES NFR BLD AUTO: 23.8 % (ref 20–40)
MCH RBC QN AUTO: 28.8 PG (ref 27–31)
MCHC RBC AUTO-ENTMCNC: 32.3 G/DL (ref 33–37)
MCV RBC AUTO: 89.1 FL (ref 80–94)
MONOCYTES # BLD: 0.8 K/UL (ref 0–0.8)
MONOCYTES NFR BLD: 16.5 % (ref 0–10)
NEUTROPHILS # BLD: 3 K/UL (ref 1.8–7)
NEUTROPHILS NFR BLD AUTO: 58.5 % (ref 50–75)
NRBC BLD AUTO-RTO: 0 % (ref 0–0)
PLATELET # BLD: 91 K/UL (ref 130–400)
PMV BLD AUTO: 10 FL (ref 7.2–11.7)
POTASSIUM SERPL-SCNC: 3.4 MMOL/L (ref 3.6–5)
PROT SERPL-MCNC: 7.3 G/DL (ref 6.3–8.2)
SODIUM SERPL-SCNC: 127 MMOL/L (ref 132–148)
WBC # BLD AUTO: 5.1 K/UL (ref 4.8–10.8)

## 2017-09-02 RX ADMIN — POTASSIUM CHLORIDE SCH MLS/HR: 10 INJECTION, SOLUTION INTRAVENOUS at 10:30

## 2017-09-02 RX ADMIN — SUCRALFATE SCH GM: 1 SUSPENSION ORAL at 16:48

## 2017-09-02 RX ADMIN — SUCRALFATE SCH GM: 1 SUSPENSION ORAL at 21:48

## 2017-09-02 RX ADMIN — POTASSIUM CHLORIDE SCH MLS/HR: 10 INJECTION, SOLUTION INTRAVENOUS at 11:54

## 2017-09-02 RX ADMIN — POTASSIUM CHLORIDE SCH MLS/HR: 10 INJECTION, SOLUTION INTRAVENOUS at 14:36

## 2017-09-02 RX ADMIN — POTASSIUM CHLORIDE SCH MEQ: 20 TABLET, EXTENDED RELEASE ORAL at 14:41

## 2017-09-02 NOTE — CP.PCM.PN
Subjective





- Date & Time of Evaluation


Date of Evaluation: 09/02/17


Time of Evaluation: 10:00





- Subjective


Subjective: 





No fever


Oriented to person only


+ Confusion


episodes of agitation however more calm today


Denies CP


no SOB


no abd pain


Moves all extremities - denies pain on ROM 








Objective





- Vital Signs/Intake and Output


Vital Signs (last 24 hours): 


 











Temp Pulse Resp BP Pulse Ox


 


 98.4 F   88   25 H  156/78 H  100 


 


 09/02/17 04:00  09/02/17 09:46  09/02/17 08:00  09/02/17 09:46  09/02/17 08:00








Intake and Output: 


 











 09/02/17 09/02/17





 06:59 18:59


 


Intake Total 2025 650


 


Output Total 990 


 


Balance 1035 650














- Medications


Medications: 


 Current Medications





Famotidine (Pepcid)  40 mg PO HS [facility]


   Last Admin: 09/01/17 23:00 Dose:  40 mg


Folic Acid (Folic Acid)  1 mg PO DAILY [facility]


   Last Admin: 09/02/17 08:32 Dose:  1 mg


Hydralazine HCl (Apresoline)  20 mg IV Q6 [facility]


   Last Admin: 09/02/17 09:46 Dose:  20 mg


Levetiracetam 500 mg/ Sodium (Chloride)  105 mls @ 210 mls/hr IVPB Q12 [facility]


   Last Admin: 09/02/17 08:33 Dose:  210 mls/hr


Sodium Chloride (Sodium Chloride 0.9%)  1,000 mls @ 175 mls/hr IV .Q5H43M [facility]


   Stop: 09/02/17 19:10


   Last Admin: 09/02/17 03:30 Dose:  175 mls/hr


Potassium Chloride (Potassium Chloride 10 Meq/100 Ml)  100 mls @ 100 mls/hr 

IVPB Q1 [facility]


   Stop: 09/02/17 11:59


   Last Admin: 09/02/17 10:30 Dose:  100 mls/hr


Lorazepam (Ativan)  2 mg IVP Q4 PRN


   PRN Reason: Agitation


   Last Admin: 09/02/17 05:53 Dose:  2 mg


Lorazepam (Ativan)  1 mg PO Q8 [facility]


   Last Admin: 09/02/17 08:32 Dose:  1 mg


Nicotine (Nicoderm Cq)  1 patch TD DAILY [facility]


   Last Admin: 09/02/17 08:33 Dose:  1 patch


Thiamine HCl (Vitamin B1 Tab)  100 mg PO DAILY [facility]


   Last Admin: 09/02/17 08:33 Dose:  100 mg











- Labs


Labs: 


 





 09/02/17 05:30 





 09/02/17 05:30 





 











PT  11.3 Seconds (9.8-13.1)   08/30/17  08:50    


 


INR  1.1  (0.9-1.2)   08/30/17  08:50    


 


APTT  31.4 Seconds (25.6-37.1)   08/30/17  08:50    














- Constitutional


Appears: Unkempt, Older Than Stated Age, Agitated, Confused, Chronically Ill





- Head Exam


Head Exam: NORMAL INSPECTION, NORMOCEPHALIC





- Eye Exam


Eye Exam: EOMI, Normal appearance





- ENT Exam


ENT Exam: Mucous Membranes Dry, Normal External Ear Exam





- Neck Exam


Neck Exam: Full ROM.  absent: Meningismus





- Respiratory Exam


Respiratory Exam: NORMAL BREATHING PATTERN.  absent: Respiratory Distress





- Cardiovascular Exam


Cardiovascular Exam: REGULAR RHYTHM, +S1, +S2





- GI/Abdominal Exam


GI & Abdominal Exam: Soft, Normal Bowel Sounds.  absent: Tenderness





- Extremities Exam


Extremities Exam: Normal Capillary Refill.  absent: Calf Tenderness, Pedal Edema





- Neurological Exam


Neurological Exam: Alert, Awake


Additional comments: 





confused


oriented to person only


moves all extremities





- Psychiatric Exam


Psychiatric exam: Agitated





- Skin


Skin Exam: Dry, Normal Color, Warm








Assessment and Plan





- Assessment and Plan (Free Text)


Assessment: 





57 [place], had 

multiple subsequent seizures, was given Ativan and Keppra.  As per family 

patient has history of ETOH abuse. Initially code stroke was called and CT head 

showed no acute pathology.  Neurology was consulted . Patient became very 

agitated , restless and confused , placed on 1:1 for safety and given haldol 

and Ativan for possible DT-s  . Transferred to ICU for close monitoring and 

started on Precedex drip.








1. ETOH abuse and withdrawal  / impending DT-s / metabolic encephalopathy 


As per family patient has long history of ETOh abuse


Restless,confused and tremulous most likely secondary to ETOH withdrawal


ETOH levels 0, UDOA negative


CT head showed no acute pathology


Seizure / withdrawal precautions


Transferred to ICU for close monitoring and started Precedex drip and Ativan PRN

- Precedex d/c  and pt placed on RTC Ativan and prn


Keep 1:1 for safety


Continue Thiamine, folic acid , MVI 


Neurology consult on board 


EEG showed no seizure activity 


MRI of brain pending - unable to do as pt agitated





 2.New onset seizure 


Most likely related to ETOH withdrawal 


CT head showed no acute pathology


EEG showed no  seizure like activity


continue MVI/Thiamine/ folic acid / IVF


Seziure precautions


Continue Keppra and Ativan 


Follow up MRI


Neurology on consult


Physical Therapy eval








3.Nicotine addiction


started nicotine patch





4.HTN


uncontrolled 


Continue hydralazine IV 





5. Transaminitis


Most likely secondary to ETOH abuse 





6. Thrombocytopenia likely BM suppression by ETOH


monitor 


sl better today








7. hyponatremia prob sec decrease solute intake and due  to med


- Keppra can cause hyponatremia





 DVt prophylaxis


SCD 


d/c Lovenox bec of low Platelet

## 2017-09-03 LAB
ALBUMIN/GLOB SERPL: 1.1 {RATIO} (ref 1–2.1)
ALP SERPL-CCNC: 59 U/L (ref 38–126)
ALT SERPL-CCNC: 475 U/L (ref 21–72)
AST SERPL-CCNC: 346 U/L (ref 17–59)
BILIRUB SERPL-MCNC: 2.2 MG/DL (ref 0.2–1.3)
BUN SERPL-MCNC: 7 MG/DL (ref 9–20)
CALCIUM SERPL-MCNC: 9.5 MG/DL (ref 8.4–10.2)
CHLORIDE SERPL-SCNC: 100 MMOL/L (ref 98–107)
CO2 SERPL-SCNC: 26 MMOL/L (ref 22–30)
ERYTHROCYTE [DISTWIDTH] IN BLOOD BY AUTOMATED COUNT: 13.3 % (ref 11.5–14.5)
GLOBULIN SER-MCNC: 3.8 GM/DL (ref 2.2–3.9)
GLUCOSE SERPL-MCNC: 114 MG/DL (ref 75–110)
HCT VFR BLD CALC: 41 % (ref 35–51)
MAGNESIUM SERPL-MCNC: 1.9 MG/DL (ref 1.6–2.3)
MCH RBC QN AUTO: 29.3 PG (ref 27–31)
MCHC RBC AUTO-ENTMCNC: 32.8 G/DL (ref 33–37)
MCV RBC AUTO: 89.2 FL (ref 80–94)
PHOSPHATE SERPL-MCNC: 3 MG/DL (ref 2.5–4.5)
PLATELET # BLD: 107 K/UL (ref 130–400)
POTASSIUM SERPL-SCNC: 3.2 MMOL/L (ref 3.6–5)
PROT SERPL-MCNC: 8 G/DL (ref 6.3–8.2)
SODIUM SERPL-SCNC: 136 MMOL/L (ref 132–148)
WBC # BLD AUTO: 5.4 K/UL (ref 4.8–10.8)

## 2017-09-03 RX ADMIN — POTASSIUM CHLORIDE SCH MEQ: 20 TABLET, EXTENDED RELEASE ORAL at 12:32

## 2017-09-03 RX ADMIN — SUCRALFATE SCH GM: 1 SUSPENSION ORAL at 16:33

## 2017-09-03 RX ADMIN — SUCRALFATE SCH GM: 1 SUSPENSION ORAL at 12:28

## 2017-09-03 RX ADMIN — SUCRALFATE SCH GM: 1 SUSPENSION ORAL at 21:18

## 2017-09-03 RX ADMIN — SUCRALFATE SCH GM: 1 SUSPENSION ORAL at 06:31

## 2017-09-03 NOTE — CP.PCM.PN
Subjective





- Date & Time of Evaluation


Date of Evaluation: 09/03/17


Time of Evaluation: 09:15





- Subjective


Subjective: 





Still confused with episodes of agitation


No fever


Oriented to person only


denies CP


no SOB


no abd pain


Moves all extremities


Denies pain anywhere in the body








Objective





- Vital Signs/Intake and Output


Vital Signs (last 24 hours): 


 











Temp Pulse Resp BP Pulse Ox


 


 97.0 F L  130 H  18   161/90 H  100 


 


 09/03/17 05:00  09/03/17 05:00  09/03/17 05:00  09/03/17 05:00  09/03/17 05:00








Intake and Output: 


 











 09/03/17 09/03/17





 06:59 18:59


 


Intake Total 2090 


 


Output Total 2400 


 


Balance -310 














- Medications


Medications: 


 Current Medications





Amlodipine Besylate (Norvasc)  5 mg PO DAILY [facility]


Famotidine (Pepcid)  40 mg PO HS [facility]


   Last Admin: 09/02/17 21:56 Dose:  40 mg


Folic Acid (Folic Acid)  1 mg PO DAILY [facility]


   Last Admin: 09/02/17 08:32 Dose:  1 mg


Hydralazine HCl (Apresoline)  20 mg IV Q6 [facility]


   Last Admin: 09/03/17 05:00 Dose:  20 mg


Levetiracetam 500 mg/ Sodium (Chloride)  105 mls @ 210 mls/hr IVPB Q12 [facility]


   Last Admin: 09/03/17 09:01 Dose:  210 mls/hr


Lorazepam (Ativan)  2 mg IVP Q4 PRN


   PRN Reason: Agitation


   Last Admin: 09/02/17 18:52 Dose:  2 mg


Lorazepam (Ativan)  1 mg PO Q8 [facility]


   Last Admin: 09/03/17 00:20 Dose:  1 mg


Nicotine (Nicoderm Cq)  1 patch TD DAILY [facility]


   Last Admin: 09/03/17 09:01 Dose:  1 patch


Potassium Chloride (K-Dur 20 Meq Er Tab)  20 meq PO DAILY [facility]


   Last Admin: 09/02/17 14:41 Dose:  20 meq


Sucralfate (Carafate Oral Susp)  1 gm PO ACHS [facility]


   Last Admin: 09/03/17 06:31 Dose:  1 gm


Thiamine HCl (Vitamin B1 Tab)  100 mg PO DAILY [facility]


   Last Admin: 09/02/17 08:33 Dose:  100 mg











- Labs


Labs: 


 





 09/03/17 06:00 





 09/03/17 06:00 





 











PT  11.3 Seconds (9.8-13.1)   08/30/17  08:50    


 


INR  1.1  (0.9-1.2)   08/30/17  08:50    


 


APTT  31.4 Seconds (25.6-37.1)   08/30/17  08:50    

















- Constitutional


Appears: Unkempt, Older Than Stated Age, Agitated, Confused, Chronically Ill





- Head Exam


Head Exam: NORMAL INSPECTION, NORMOCEPHALIC





- Eye Exam


Eye Exam: EOMI, Normal appearance





- ENT Exam


ENT Exam: Mucous Membranes Dry, Normal External Ear Exam





- Neck Exam


Neck Exam: Full ROM.  absent: Meningismus





- Respiratory Exam


Respiratory Exam: NORMAL BREATHING PATTERN.  absent: Respiratory Distress





- Cardiovascular Exam


Cardiovascular Exam: REGULAR RHYTHM, +S1, +S2





- GI/Abdominal Exam


GI & Abdominal Exam: Soft, Normal Bowel Sounds.  absent: Tenderness





- Extremities Exam


Extremities Exam: Normal Capillary Refill.  absent: Calf Tenderness, Pedal Edema





- Neurological Exam


Neurological Exam: Alert, Awake


Additional comments: 





confused


oriented to person only


moves all extremities





- Psychiatric Exam


Psychiatric exam: Agitated





- Skin


Skin Exam: Dry, Normal Color, Warm








Assessment and Plan





- Assessment and Plan (Free Text)


Assessment: 





57 [place], had 

multiple subsequent seizures, was given Ativan and Keppra.  As per family 

patient has history of ETOH abuse. Initially code stroke was called and CT head 

showed no acute pathology.  Neurology was consulted . Patient became very 

agitated , restless and confused , placed on 1:1 for safety and given haldol 

and Ativan for  DT-s  . Transferred to ICU for close monitoring and started on 

Precedex drip.


Now transferred to Telemetry





1. ETOH abuse and withdrawal  / impending DT-s / metabolic encephalopathy 


As per family patient has long history of ETOh abuse


Restless,confused and tremulous most likely secondary to ETOH withdrawal


ETOH levels 0, UDOA negative


CT head showed no acute pathology


Seizure / withdrawal precautions


Transferred to ICU for close monitoring and started Precedex drip and Ativan PRN

- Precedex d/c  and pt placed on RTC Ativan and prn


Keep 1:1 for safety


Continue Thiamine, folic acid , MVI 


Neurology consult on board 


EEG showed no seizure activity 


MRI of brain pending - unable to do as pt agitated





 2.New onset seizure 


Most likely related to ETOH withdrawal 


CT head showed no acute pathology


EEG showed no  seizure like activity


continue MVI/Thiamine/ folic acid / IVF


Seziure precautions


Continue Keppra and Ativan 


Follow up MRI


Neurology on consult


Physical Therapy eval








3.Nicotine addiction


started nicotine patch





4.HTN


uncontrolled 


Continue hydralazine IV  prn


started on Clonidine 0.1 mg bid





5. Transaminitis


Most likely secondary to ETOH abuse 





6. Thrombocytopenia likely BM suppression by ETOH


monitor 


sl better today








7. Hyponatremia prob sec decrease solute intake


- Keppra can  also cause hyponatremia


- improved with IVF hydration with NS





 DVt prophylaxis


SCD 


d/c Lovenox bec of low Platelet

## 2017-09-03 NOTE — PN
DATE:  09/02/2017



CRITICAL CARE PROGRESS NOTE



The patient is in ICU bed 432.



TIME SPENT:  35 minutes.



The patient is seen and evaluated at the bedside.  Events since admission

reviewed.



HISTORY OF PRESENT ILLNESS:  A 57-year-old male admitted after [place]

[place], had multiple seizures, given Ativan and

Keppra.  History of EtOH abuse.  Admitted to ICU as the patient was noted

to be restless and confused on one-to-one.  The patient remains some

confused and oriented only to name and time, but not to place.  Remains

seizure free since admission.  Has been on Precedex and Ativan p.r.n., on

one-to-one for safety, seen by neurology consult.  EEG showed no seizure

activity.  MRI of the brain pending as the patient is some agitated.



PHYSICAL EXAMINATION:

VITAL SIGNS:  Temperature 98.2, heart rate 98, blood pressure 167/82,

oxygen saturation 98%. Intake 4315 and output 1990.   Positive balance

2325.  Weight 132 pound

HEAD, EYES, EARS, NOSE, AND THROAT:  Pupils are reactive.  Conjunctivae

pale.  Sclerae anicteric.  Oral mucosa moist.

CHEST:  Bilateral breath sounds clear to auscultation.

HEART:  Rhythm regular.  S1 and S2 normal intensity.

ABDOMEN:  Bowel sounds present and soft.

EXTREMITIES:  Without edema.

NEUROLOGIC:  Alert and awake.  As noted oriented to name; year, 1999;

place, rehab center.



LABORATORY DATA:  WBC 5.1, hemoglobin 13.2, hematocrit 40.9, and platelet

count 91.  PT 11.3, INR 1.1, PTT 31.4.  SMA-7:  Sodium 127, potassium 3.4,

chloride 98, CO2 22, blood urea nitrogen 9, creatinine 0.6, and random

glucose 108, total bilirubin 1.9, , .  Toxicology negative. 

Alcohol level less than 10.  Microbiology:  Nasal smear MRSA negative. 

Head CT, no intracranial hemorrhage or mass effect.  Maxillary and ethmoid

chronic sinusitis changes.



IMPRESSION AND PLAN:

1.  Neuro:  Confused and agitated at times.  Metabolic encephalopathy

status post recurrent seizure, resolving, postictal state.  Continue

one-to-one observation.  Continue with thiamine, folic acid and

multivitamin.  Recurrent seizure, currently controlled on Keppra and

Ativan.  Appreciate neurology input.  EEG inconclusive.  CT head showed no

acute pathology.

2.  Pulmonary:  Stable.

3.  Cardiac:  Normotensive.  No cardiac arrhythmia.

4.  Nicotine dependence, monitor for withdrawal.  Continue nicotine patch. 

Hypertension, on hydralazine, maintain systolic pressure less than 140. 

Abnormal LFT related to alcohol.  Thrombocytopenia, likely bone marrow

suppression by EtOH, stable.  Hyponatremia, closely monitor.  Continue

regular diet and IV fluids.





__________________________________________

Simón Hernandez MD





DD:  09/02/2017 16:55:55

DT:  09/03/2017 0:16:35

Job # [id number]

## 2017-09-04 LAB
ALBUMIN/GLOB SERPL: 1 {RATIO} (ref 1–2.1)
ALP SERPL-CCNC: 46 U/L (ref 38–126)
ALT SERPL-CCNC: 356 U/L (ref 21–72)
AST SERPL-CCNC: 190 U/L (ref 17–59)
BILIRUB SERPL-MCNC: 1.6 MG/DL (ref 0.2–1.3)
BUN SERPL-MCNC: 11 MG/DL (ref 9–20)
CALCIUM SERPL-MCNC: 9.2 MG/DL (ref 8.4–10.2)
CHLORIDE SERPL-SCNC: 99 MMOL/L (ref 98–107)
CO2 SERPL-SCNC: 25 MMOL/L (ref 22–30)
ERYTHROCYTE [DISTWIDTH] IN BLOOD BY AUTOMATED COUNT: 13.2 % (ref 11.5–14.5)
GLOBULIN SER-MCNC: 3.3 GM/DL (ref 2.2–3.9)
GLUCOSE SERPL-MCNC: 89 MG/DL (ref 75–110)
HCT VFR BLD CALC: 36.7 % (ref 35–51)
MCH RBC QN AUTO: 29.4 PG (ref 27–31)
MCHC RBC AUTO-ENTMCNC: 33.2 G/DL (ref 33–37)
MCV RBC AUTO: 88.7 FL (ref 80–94)
PLATELET # BLD: 129 K/UL (ref 130–400)
POTASSIUM SERPL-SCNC: 3.7 MMOL/L (ref 3.6–5)
PROT SERPL-MCNC: 6.8 G/DL (ref 6.3–8.2)
SODIUM SERPL-SCNC: 133 MMOL/L (ref 132–148)
WBC # BLD AUTO: 4.5 K/UL (ref 4.8–10.8)

## 2017-09-04 RX ADMIN — SUCRALFATE SCH GM: 1 SUSPENSION ORAL at 21:21

## 2017-09-04 RX ADMIN — SUCRALFATE SCH GM: 1 SUSPENSION ORAL at 16:34

## 2017-09-04 RX ADMIN — SUCRALFATE SCH: 1 SUSPENSION ORAL at 11:00

## 2017-09-04 RX ADMIN — POTASSIUM CHLORIDE SCH MEQ: 20 TABLET, EXTENDED RELEASE ORAL at 09:01

## 2017-09-04 RX ADMIN — SUCRALFATE SCH: 1 SUSPENSION ORAL at 07:30

## 2017-09-04 NOTE — CP.PCM.PN
Subjective





- Date & Time of Evaluation


Date of Evaluation: 09/04/17


Time of Evaluation: 10:30





- Subjective


Subjective: 


Patient was seen and examined bedside. Lying in bed comfortable , calm in NAD. 

Denies any discomfort, SOB or chest pain . Answering questions and following 

commands, No gross neuro deficits.


Still confused to time and place (thinks is 1998)


Hemodtynamically stable, afebrile. No acute issues overnight


on 1 :1 for safety 





Objective





- Vital Signs/Intake and Output


Vital Signs (last 24 hours): 


 











Temp Pulse Resp BP Pulse Ox


 


 98.3 F   64   20   138/69   98 


 


 09/04/17 09:00  09/04/17 09:00  09/04/17 09:00  09/04/17 09:00  09/04/17 09:00











- Medications


Medications: 


 Current Medications





Clonidine HCl (Catapres)  0.1 mg PO BID [facility]


   Last Admin: 09/04/17 09:00 Dose:  0.1 mg


Famotidine (Pepcid)  40 mg PO HS [facility]


   Last Admin: 09/03/17 21:18 Dose:  40 mg


Folic Acid (Folic Acid)  1 mg PO DAILY [facility]


   Last Admin: 09/04/17 09:01 Dose:  1 mg


Levetiracetam 500 mg/ Sodium (Chloride)  105 mls @ 210 mls/hr IVPB Q12 [facility]


   Last Admin: 09/04/17 09:02 Dose:  210 mls/hr


Lorazepam (Ativan)  1 mg PO Q8 [facility]


   Last Admin: 09/04/17 09:00 Dose:  1 mg


Nicotine (Nicoderm Cq)  1 patch TD DAILY [facility]


   Last Admin: 09/04/17 09:02 Dose:  1 patch


Potassium Chloride (K-Dur 20 Meq Er Tab)  20 meq PO DAILY [facility]


   Last Admin: 09/04/17 09:01 Dose:  20 meq


Sucralfate (Carafate Oral Susp)  1 gm PO ACHS [facility]


   Last Admin: 09/03/17 21:18 Dose:  1 gm


Thiamine HCl (Vitamin B1 Tab)  100 mg PO DAILY [facility]


   Last Admin: 09/04/17 09:01 Dose:  100 mg











- Labs


Labs: 


 





 09/04/17 05:30 





 09/04/17 05:30 





 











PT  11.3 Seconds (9.8-13.1)   08/30/17  08:50    


 


INR  1.1  (0.9-1.2)   08/30/17  08:50    


 


APTT  31.4 Seconds (25.6-37.1)   08/30/17  08:50    














- Constitutional


Appears: Non-toxic, No Acute Distress





- Head Exam


Head Exam: ATRAUMATIC, NORMAL INSPECTION, NORMOCEPHALIC





- Eye Exam


Eye Exam: EOMI, Normal appearance, PERRL


Pupil Exam: NORMAL ACCOMODATION





- ENT Exam


ENT Exam: Mucous Membranes Moist, Normal Exam





- Neck Exam


Neck Exam: Full ROM, Normal Inspection





- Respiratory Exam


Respiratory Exam: Clear to Ausculation Bilateral, NORMAL BREATHING PATTERN.  

absent: Rales, Rhonchi, Wheezes





- Cardiovascular Exam


Cardiovascular Exam: REGULAR RHYTHM, RRR, +S1, +S2.  absent: JVD





- GI/Abdominal Exam


GI & Abdominal Exam: Soft, Normal Bowel Sounds.  absent: Distended, Guarding, 

Tenderness, Rebound





- Rectal Exam


Rectal Exam: Deferred





- Extremities Exam


Extremities Exam: Full ROM, Normal Capillary Refill, Normal Inspection.  absent

: Calf Tenderness, Pedal Edema





- Back Exam


Back Exam: NORMAL INSPECTION





- Neurological Exam


Neurological Exam: Alert, Awake, CN II-XII Intact.  absent: Oriented x3





- Psychiatric Exam


Psychiatric exam: Normal Affect





- Skin


Skin Exam: Dry, Intact, Normal Color, Warm





Assessment and Plan





- Assessment and Plan (Free Text)


Assessment: 


57 year old [place], had 

multiple subsequent seizures, was given Ativan and Keppra.  As per family 

patient has history of ETOH abuse. Initially code stroke was called and CT head 

showed no acute pathology.  Neurology was consulted . Patient became very 

agitated , restless and confused , placed on 1:1 for safety and given haldol 

and Ativan for  DT-s  . Transferred to ICU for close monitoring and started on 

Precedex drip.


Now transferred to Telemetry, calm, awake and alert no oriented to time and 

place








1. ETOH abuse and withdrawal  / impending DT-s / metabolic encephalopathy 


As per family patient has long history of ETOH abuse


ETOH levels 0, UDOA negative - in admission


He was restless,confused and tremulous most likely secondary to ETOH withdrawal 

was initially in ICU in presedex drip and Ativan PRN. At present off presedex 

drip , calm ,with no tremors , in telemetry and Ativan PRN 


CT head showed no acute pathology


on  1:1 for safety


Continue Thiamine, folic acid , MVI 


Neurology consult  appreciated  


EEG showed no seizure activity 


MRI of brain pending - unable to do as pt agitated


Seizure / withdrawal precautions








 2.New onset seizure 


Most likely related to ETOH withdrawal 


CT head showed no acute pathology


EEG showed no  seizure like activity


continue MVI/Thiamine/ folic acid / IVF


Seziure precautions


Continue Keppra and Ativan 


Follow up MRI


Neurology on consult


Physical Therapy eval





3.Nicotine addiction


started nicotine patch





4.HTN


better controlled 


started on Clonidine 0.1 mg bid





5. Transaminitis/ elevated bilirubin 


Most likely secondary to ETOH abuse 





6. Thrombocytopenia likely BM suppression by ETOH


monitor 


plt 129 K








7. Hyponatremia prob sec decrease solute intake


improved with IVF hydration with NS





 DVt prophylaxis


SCD 


discontinued Lovenox bec of low Platelet

## 2017-09-04 NOTE — MRI
PROCEDURE:  MRI BRAIN WITHOUT CONTRAST



HISTORY:

seizure



COMPARISON:

Comparison made with prior CT scan of brain dated 08/30/2017. . 



TECHNIQUE:

Multiplanar, multisequence MR images of the brain were obtained 

without intravenous contrast enhancement.



FINDINGS:



HEMORRHAGE:

No acute parenchymal, subarachnoid or extra-axial hemorrhage. No 

evidence of hemosiderin deposition identified on gradient echo 

weighted sequence 



DWI:

No evidence of an acute or early subacute infarction seen on 

diffusion imaging. .



BRAIN PARENCHYMA:

Mild diffuse/confluent chronic white matter ischemic changes 

extending peripherally into the deep and to a lesser degree 

subcortical white matter both cerebral hemispheres seen to better 

advantage on this study as compared to CT scan.  Multiple more 

discrete chronic appearing lacunar type infarcts scattered about the 

deep and subcortical white matter as well as both superior basal 

nuclei.  There also appears to be a tiny chronic lacunar type infarct 

within the left parasagittal midpons region. None of these changes 

exhibit restricted diffusion. 



No obvious parenchymal nor extra-axial mass or collection identified 

on this noncontrast study. 



Note also made of scattered dilated perivascular spaces 



Moderate generalized volume loss.



VENTRICLES:

No evidence of obstructive hydrocephalus. 



CRANIUM:

No gross calvarial abnormality identified



ORBITS:

Orbits and contents grossly unremarkable. .



PARANASAL SINUSES/MASTOIDS:

Clear



VASCULAR SYSTEM:

Visualized major vascular flow voids at skull base are patent. 



OTHER FINDINGS:

None. 



IMPRESSION:

No acute intracranial hemorrhage or acute infarct.



Chronic white matter and lesser basal nuclei as well as brainstem 

ischemic changes.

## 2017-09-05 VITALS
DIASTOLIC BLOOD PRESSURE: 62 MMHG | HEART RATE: 56 BPM | OXYGEN SATURATION: 97 % | RESPIRATION RATE: 18 BRPM | SYSTOLIC BLOOD PRESSURE: 116 MMHG

## 2017-09-05 VITALS — TEMPERATURE: 98.6 F

## 2017-09-05 LAB
BUN SERPL-MCNC: 11 MG/DL (ref 9–20)
CALCIUM SERPL-MCNC: 9.1 MG/DL (ref 8.4–10.2)
CHLORIDE SERPL-SCNC: 102 MMOL/L (ref 98–107)
CO2 SERPL-SCNC: 24 MMOL/L (ref 22–30)
ERYTHROCYTE [DISTWIDTH] IN BLOOD BY AUTOMATED COUNT: 13 % (ref 11.5–14.5)
GLUCOSE SERPL-MCNC: 95 MG/DL (ref 75–110)
HCT VFR BLD CALC: 37.7 % (ref 35–51)
MCH RBC QN AUTO: 29.2 PG (ref 27–31)
MCHC RBC AUTO-ENTMCNC: 32.2 G/DL (ref 33–37)
MCV RBC AUTO: 90.7 FL (ref 80–94)
PLATELET # BLD: 146 K/UL (ref 130–400)
POTASSIUM SERPL-SCNC: 4.1 MMOL/L (ref 3.6–5)
SODIUM SERPL-SCNC: 134 MMOL/L (ref 132–148)
WBC # BLD AUTO: 5.4 K/UL (ref 4.8–10.8)

## 2017-09-05 PROCEDURE — 3E0234Z INTRODUCTION OF SERUM, TOXOID AND VACCINE INTO MUSCLE, PERCUTANEOUS APPROACH: ICD-10-PCS | Performed by: INTERNAL MEDICINE

## 2017-09-05 RX ADMIN — SUCRALFATE SCH GM: 1 SUSPENSION ORAL at 12:29

## 2017-09-05 RX ADMIN — SUCRALFATE SCH GM: 1 SUSPENSION ORAL at 09:05

## 2017-09-05 RX ADMIN — SUCRALFATE SCH GM: 1 SUSPENSION ORAL at 16:27

## 2017-09-05 RX ADMIN — POTASSIUM CHLORIDE SCH MEQ: 20 TABLET, EXTENDED RELEASE ORAL at 09:01

## 2017-09-05 NOTE — CP.PCM.DIS
Provider





- Provider


Date of Admission: 


08/31/17 07:54





Attending physician: 


Jazmín Campos DO





Consults: 





neuro consult 


Time Spent in preparation of Discharge (in minutes): 20





Hospital Course





- Lab Results


Lab Results: 


 Micro Results





09/02/17 18:10   Naris   MRSA Culture (Admit) - Final


                            MRSA NOT DETECTED





 Most Recent Lab Values











WBC  5.4 K/uL (4.8-10.8)   09/05/17  04:30    


 


RBC  4.15 Mil/uL (4.40-5.90)  L  09/05/17  04:30    


 


Hgb  12.1 g/dL (12.0-18.0)   09/05/17  04:30    


 


Hct  37.7 % (35.0-51.0)   09/05/17  04:30    


 


MCV  90.7 fl (80.0-94.0)  D 09/05/17  04:30    


 


MCH  29.2 pg (27.0-31.0)   09/05/17  04:30    


 


MCHC  32.2 g/dL (33.0-37.0)  L  09/05/17  04:30    


 


RDW  13.0 % (11.5-14.5)   09/05/17  04:30    


 


Plt Count  146 K/uL (130-400)   09/05/17  04:30    


 


MPV  10.0 fl (7.2-11.7)   09/02/17  05:30    


 


Neut % (Auto)  58.5 % (50.0-75.0)   09/02/17  05:30    


 


Lymph % (Auto)  23.8 % (20.0-40.0)   09/02/17  05:30    


 


Mono % (Auto)  16.5 % (0.0-10.0)  H  09/02/17  05:30    


 


Eos % (Auto)  0.3 % (0.0-4.0)   09/02/17  05:30    


 


Baso % (Auto)  0.9 % (0.0-2.0)   09/02/17  05:30    


 


Neut #  3.0 K/uL (1.8-7.0)   09/02/17  05:30    


 


Lymph #  1.2 K/uL (1.0-4.3)   09/02/17  05:30    


 


Mono #  0.8 K/uL (0.0-0.8)   09/02/17  05:30    


 


Eos #  0.0 K/uL (0.0-0.7)   09/02/17  05:30    


 


Baso #  0.0 K/uL (0.0-0.2)   09/02/17  05:30    


 


Differential Comment  Cancelled   08/30/17  08:50    


 


PT  11.3 Seconds (9.8-13.1)   08/30/17  08:50    


 


INR  1.1  (0.9-1.2)   08/30/17  08:50    


 


APTT  31.4 Seconds (25.6-37.1)   08/30/17  08:50    


 


pCO2  50 mm/Hg (35-45)  H  08/30/17  09:50    


 


pO2  82 mm/Hg ()   08/30/17  09:50    


 


HCO3  22.4 mmol/L (21-28)   08/30/17  09:50    


 


ABG pH  7.29  (7.35-7.45)  L  08/30/17  09:50    


 


ABG Total CO2  25.5 mmol/L (22-28)   08/30/17  09:50    


 


ABG O2 Saturation  98.0 % (95-98)   08/30/17  09:50    


 


ABG Base Excess  -3.1 mmol/L (-2.0-3.0)  L  08/30/17  09:50    


 


Akshat Test  Yes   08/30/17  09:50    


 


ABG Potassium  4.2 mmol/L (3.6-5.2)   08/30/17  09:50    


 


A-a O2 Difference  55.0 mm/Hg  08/30/17  09:50    


 


Sodium  131.0 mmol/L (132-148)  L  08/30/17  09:50    


 


Chloride  96.0 mmol/L ()  L  08/30/17  09:50    


 


Glucose  260 mg/dL ()  H  08/30/17  09:50    


 


Lactate  5.2 mmol/L (0.7-2.1)  H*  08/30/17  09:50    


 


FiO2  28.0 %  08/30/17  09:50    


 


Crit Value Called To  David harris   08/30/17  09:50    


 


Crit Value Called By  23   08/30/17  09:50    


 


Crit Value Read Back  Y   08/30/17  09:50    


 


Blood Gas Notified Time  1000   08/30/17  09:50    


 


Sodium  134 mmol/l (132-148)   09/05/17  04:30    


 


Potassium  4.1 MMOL/L (3.6-5.0)   09/05/17  04:30    


 


Chloride  102 mmol/L ()   09/05/17  04:30    


 


Carbon Dioxide  24 mmol/L (22-30)   09/05/17  04:30    


 


Anion Gap  13  (10-20)   09/05/17  04:30    


 


BUN  11 mg/dl (9-20)   09/05/17  04:30    


 


Creatinine  0.7 mg/dL (0.8-1.5)  L  09/05/17  04:30    


 


Est GFR ( Amer)  > 60   09/05/17  04:30    


 


Est GFR (Non-Af Amer)  > 60   09/05/17  04:30    


 


POC Glucose (mg/dL)  91 mg/dL ()   09/02/17  05:17    


 


Random Glucose  95 mg/dL ()   09/05/17  04:30    


 


Hemoglobin A1c  5.7 % (4.2-6.5)   08/30/17  08:50    


 


Calcium  9.1 mg/dL (8.4-10.2)   09/05/17  04:30    


 


Phosphorus  3.0 mg/dl (2.5-4.5)   09/03/17  06:00    


 


Magnesium  1.9 MG/DL (1.6-2.3)   09/03/17  06:00    


 


Total Bilirubin  1.6 mg/dl (0.2-1.3)  H  09/04/17  05:30    


 


AST  190 U/L (17-59)  H D 09/04/17  05:30    


 


ALT  356 U/L (21-72)  H D 09/04/17  05:30    


 


Alkaline Phosphatase  46 U/L ()   09/04/17  05:30    


 


Troponin I  < 0.0120 ng/mL (0.00-0.120)   08/30/17  08:50    


 


Total Protein  6.8 G/DL (6.3-8.2)   09/04/17  05:30    


 


Albumin  3.5 g/dL (3.5-5.0)   09/04/17  05:30    


 


Globulin  3.3 gm/dL (2.2-3.9)   09/04/17  05:30    


 


Albumin/Globulin Ratio  1.0  (1.0-2.1)   09/04/17  05:30    


 


Triglycerides  108 mg/DL (0-149)   08/30/17  08:50    


 


Cholesterol  250 mg/dL (0-199)  H  08/30/17  08:50    


 


LDL Cholesterol Direct  129 mg/dL (0-129)   08/30/17  08:50    


 


HDL Cholesterol  92 MG/DL (30-70)  H  08/30/17  08:50    


 


Arterial Blood Potassium  4.2 mmol/L (3.6-5.2)   08/30/17  09:50    


 


Urine Opiates Screen  Negative  (NEGATIVE)   08/30/17  09:30    


 


Urine Methadone Screen  Negative  (NEGATIVE)   08/30/17  09:30    


 


Ur Barbiturates Screen  Negative  (NEGATIVE)   08/30/17  09:30    


 


Ur Phencyclidine Scrn  Negative  (NEGATIVE)   08/30/17  09:30    


 


Ur Amphetamines Screen  Negative  (NEGATIVE)   08/30/17  09:30    


 


U Benzodiazepines Scrn  Negative  (NEGATIVE)   08/30/17  09:30    


 


U Oth Cocaine Metabols  Negative  (NEGATIVE)   08/30/17  09:30    


 


U Cannabinoids Screen  Negative  (NEGATIVE)   08/30/17  09:30    


 


Alcohol, Quantitative  < 10 mg/dl (0-10)   08/30/17  08:50    


 


Blood Type  A NEGATIVE   08/30/17  08:50    


 


Antibody Screen  Negative   08/30/17  08:50    


 


BBK History Checked  No verified bt   08/30/17  08:50    














- Hospital Course


Hospital Course: 


57 year [place], had 

multiple subsequent seizures, was given Ativan and Keppra.  As per family 

patient has history of ETOH abuse. Initially code stroke was called and CT head 

showed no acute pathology.  Neurology was consulted . Patient became very 

agitated , restless and confused due to alcoholism , placed on 1:1 for safety 

and given haldol and Ativan for  DT-s  .Strated on keppra for seizure activity  

Transferred to ICU for close monitoring and started on Precedex drip.Patient 

clinically improved , transferred to telemetry . His mental status improved 

with some fluctuations but able to explain why he is in the hospital , the 

reason for his condition , oriented to place , self. MRI brain showed no acute 

pathology  At present he refuses detox  or AA since he has tried it multiple 

times and has relapsed. PT consulted for gait eval and training . Discussed 

with PT.Patient has some gait problems that Pt is unlikely to correct . No 

assist devices were recommended.


Patient is hemodynamically stable . Will discharge patient home with family ( 

discussed with patient's daughter plan for discharge and she will come to pick 

him up 0


patient lives with his sister 


information about AA and detox programs provided by 


Prescriptions for Thiamine, folic acid, keppra,clonidin eand nicotine patch 

provided 














1. ETOH abuse and withdrawal  / impending DT-s / metabolic encephalopathy 


As per family patient has long history of ETOH abuse


ETOH levels 0, UDOA negative - in admission


He was restless,confused and tremulous most likely secondary to ETOH withdrawal 

was initially in ICU in presedex drip and Ativan PRN. At present off presedex 

drip , calm ,with no tremors ,alert awake and oriented  to self, place and 

person 


CT / MRI  head showed no acute pathology


was placed on  1:1 for safety


Continue Thiamine, folic acid , MVI 


Neurology consult  appreciated  


EEG showed no seizure activity 


refused AA or detox


patient has tried multiple detox and AA in the past . Provided information 

about AA and detox programs. ( daughter requested naloxone injections but 

advised to follow up with detox centers )








 2.New onset seizure 


Most likely related to ETOH withdrawal 


CT/ MRI  head showed no acute pathology


EEG showed no  seizure like activity


continue MVI/Thiamine/ folic acid / IVF


Continue Keppra 


counselled on ETOh abuse 


Neurology consulted


Physical Therapy eval appreciated 








3.Nicotine addiction


started nicotine patch





4.HTN


better controlled 


started on Clonidine 0.1 mg bid





5. Transaminitis/ elevated bilirubin 


Most likely secondary to ETOH abuse 





6. Thrombocytopenia likely BM suppression by ETOH


monitor 


plt 129 K








7. Hyponatremia prob sec decrease solute intake


improved with IVF hydration with NS





 DVt prophylaxis


SCD 


discontinued Lovenox bec of low Platelet














Discharge Exam





- Head Exam


Head Exam: ATRAUMATIC, NORMAL INSPECTION, NORMOCEPHALIC





- Eye Exam


Eye Exam: EOMI, Normal appearance, PERRL


Pupil Exam: NORMAL ACCOMODATION





- ENT Exam


ENT Exam: Mucous Membranes Moist, Normal Exam





- Neck Exam


Neck exam: Full Rom, Normal Inspection





- Respiratory Exam


Respiratory Exam: Clear to PA & Lateral, NORMAL BREATHING PATTERN.  absent: 

Rales, Rhonchi, Wheezes





- Cardiovascular Exam


Cardiovascular Exam: REGULAR RHYTHM, RRR, +S1, +S2.  absent: JVD





- GI/Abdominal Exam


GI & Abdominal Exam: Normal Bowel Sounds, Soft.  absent: Distended, Guarding, 

Rebound, Tenderness





- Rectal Exam


Rectal Exam: Deferred





- Extremities Exam


Extremities exam: normal capillary refill, normal inspection, pedal pulses 

present





- Back Exam


Back exam: NORMAL INSPECTION





- Neurological Exam


Neurological exam: Alert, CN II-XII Intact, Reflexes Normal


Additional comments: 





oriented to place , person and self 





- Psychiatric Exam


Psychiatric exam: Normal Affect





- Skin


Skin Exam: Dry, Intact, Normal Color, Warm





Discharge Plan





- Discharge Medications


Prescriptions: 


cloNIDine [Catapres] 0.1 mg PO BID #60 tab


Famotidine [Pepcid] 40 mg PO HS #30 tab


Folic Acid 1 mg PO DAILY #30 tab


Levetiracetam [Keppra] 500 mg PO BID #60 tablet


Nicotine 21 mg/24 hr [Nicoderm Cq] 1 patch TD DAILY #30 patch


Thiamine [Vitamin B1 Tab] 100 mg PO DAILY #30 tab





- Follow Up Plan


Condition: STABLE


Disposition: HOME/ ROUTINE


Patient education suggested?: Yes


Instructions:  Alcohol Intoxication (DC), Abuse of Alcohol (DC), Alcohol 

Withdrawal (DC)


Referrals: 


[facility] [place] [Outside]

## 2018-10-19 ENCOUNTER — HOSPITAL ENCOUNTER (INPATIENT)
Dept: HOSPITAL 14 - H.ER | Age: 58
LOS: 44 days | Discharge: HOME | DRG: 126 | End: 2018-12-02
Attending: INTERNAL MEDICINE | Admitting: INTERNAL MEDICINE
Payer: MEDICAID

## 2018-10-19 VITALS — BODY MASS INDEX: 20.7 KG/M2

## 2018-10-19 DIAGNOSIS — I35.8: ICD-10-CM

## 2018-10-19 DIAGNOSIS — F10.239: ICD-10-CM

## 2018-10-19 DIAGNOSIS — G93.41: ICD-10-CM

## 2018-10-19 DIAGNOSIS — F10.231: ICD-10-CM

## 2018-10-19 DIAGNOSIS — I33.0: Primary | ICD-10-CM

## 2018-10-19 DIAGNOSIS — Z87.891: ICD-10-CM

## 2018-10-19 DIAGNOSIS — Z78.1: ICD-10-CM

## 2018-10-19 DIAGNOSIS — B96.89: ICD-10-CM

## 2018-10-19 DIAGNOSIS — E86.0: ICD-10-CM

## 2018-10-19 DIAGNOSIS — G40.501: ICD-10-CM

## 2018-10-19 DIAGNOSIS — R00.1: ICD-10-CM

## 2018-10-19 DIAGNOSIS — Z91.14: ICD-10-CM

## 2018-10-19 DIAGNOSIS — F19.10: ICD-10-CM

## 2018-10-19 DIAGNOSIS — E87.2: ICD-10-CM

## 2018-10-19 DIAGNOSIS — E87.6: ICD-10-CM

## 2018-10-19 DIAGNOSIS — R73.9: ICD-10-CM

## 2018-10-19 DIAGNOSIS — R78.81: ICD-10-CM

## 2018-10-19 DIAGNOSIS — D69.6: ICD-10-CM

## 2018-10-19 DIAGNOSIS — B18.2: ICD-10-CM

## 2018-10-19 DIAGNOSIS — I10: ICD-10-CM

## 2018-10-19 DIAGNOSIS — G40.901: ICD-10-CM

## 2018-10-19 DIAGNOSIS — A49.01: ICD-10-CM

## 2018-10-19 DIAGNOSIS — Z79.899: ICD-10-CM

## 2018-10-19 DIAGNOSIS — K70.10: ICD-10-CM

## 2018-10-19 LAB
ALBUMIN SERPL-MCNC: 5.1 G/DL (ref 3.5–5)
ALBUMIN/GLOB SERPL: 1 {RATIO} (ref 1–2.1)
ALT SERPL-CCNC: 227 U/L (ref 21–72)
APTT BLD: 36.2 SECONDS (ref 25.6–37.1)
ARTERIAL BLOOD GAS O2 SAT: 99.3 % (ref 95–98)
ARTERIAL BLOOD GAS PCO2: 32 MM/HG (ref 35–45)
ARTERIAL BLOOD GAS TCO2: 20.8 MMOL/L (ref 22–28)
ARTERIAL PATENCY WRIST A: YES
AST SERPL-CCNC: 271 U/L (ref 17–59)
BASE EXCESS BLDV CALC-SCNC: -26.5 MMOL/L (ref 0–2)
BASOPHILS # BLD AUTO: 0 K/UL (ref 0–0.2)
BASOPHILS NFR BLD: 0.5 % (ref 0–2)
BUN SERPL-MCNC: 8 MG/DL (ref 9–20)
CALCIUM SERPL-MCNC: 9.6 MG/DL (ref 8.4–10.2)
EOSINOPHIL # BLD AUTO: 0 K/UL (ref 0–0.7)
EOSINOPHIL NFR BLD: 0 % (ref 0–4)
ERYTHROCYTE [DISTWIDTH] IN BLOOD BY AUTOMATED COUNT: 13.9 % (ref 11.5–14.5)
GFR NON-AFRICAN AMERICAN: > 60
HCO3 BLDA-SCNC: 21.7 MMOL/L (ref 21–28)
HGB BLD-MCNC: 14.7 G/DL (ref 12–18)
INHALED O2 CONCENTRATION: 100 %
INR PPP: 1.1
LYMPHOCYTES # BLD AUTO: 1.2 K/UL (ref 1–4.3)
LYMPHOCYTES NFR BLD AUTO: 21.7 % (ref 20–40)
MCH RBC QN AUTO: 29.3 PG (ref 27–31)
MCHC RBC AUTO-ENTMCNC: 31.5 G/DL (ref 33–37)
MCV RBC AUTO: 93.2 FL (ref 80–94)
MONOCYTES # BLD: 0.5 K/UL (ref 0–0.8)
MONOCYTES NFR BLD: 8.5 % (ref 0–10)
NEUTROPHILS # BLD: 3.8 K/UL (ref 1.8–7)
NEUTROPHILS NFR BLD AUTO: 69.3 % (ref 50–75)
NRBC BLD AUTO-RTO: 0.1 % (ref 0–0)
PCO2 BLDV: 48 MMHG (ref 40–60)
PH BLDA: 7.4 [PH] (ref 7.35–7.45)
PH BLDV: 6.83 [PH] (ref 7.32–7.43)
PHENYTOIN SERPL SCN-MCNC: < 3 UG/ML (ref 10–20)
PLATELET # BLD: 108 K/UL (ref 130–400)
PMV BLD AUTO: 10.9 FL (ref 7.2–11.7)
PO2 BLDA: 181 MM/HG (ref 80–100)
PROTHROMBIN TIME: 12.6 SECONDS (ref 9.8–13.1)
RBC # BLD AUTO: 5.02 MIL/UL (ref 4.4–5.9)
VALPROATE SERPL-MCNC: < 10 UG/ML (ref 50–100)
VENOUS BLOOD FIO2: 21 %
VENOUS BLOOD GAS PO2: 62 MM/HG (ref 30–55)
WBC # BLD AUTO: 5.4 K/UL (ref 4.8–10.8)

## 2018-10-20 LAB
BILIRUB UR-MCNC: NEGATIVE MG/DL
BUN SERPL-MCNC: 9 MG/DL (ref 9–20)
CALCIUM SERPL-MCNC: 8.4 MG/DL (ref 8.4–10.2)
COLOR UR: YELLOW
GFR NON-AFRICAN AMERICAN: > 60
GLUCOSE UR STRIP-MCNC: 50 MG/DL
LEUKOCYTE ESTERASE UR-ACNC: (no result) LEU/UL
PH UR STRIP: 6 [PH] (ref 5–8)
PROT UR STRIP-MCNC: 100 MG/DL
RBC # UR STRIP: (no result) /UL
SP GR UR STRIP: 1.02 (ref 1–1.03)
URINE CLARITY: CLEAR
UROBILINOGEN UR-MCNC: (no result) MG/DL (ref 0.2–1)

## 2018-10-20 RX ADMIN — THIAMINE HYDROCHLORIDE SCH MG: 100 INJECTION, SOLUTION INTRAMUSCULAR; INTRAVENOUS at 09:22

## 2018-10-20 NOTE — CP.PCM.HP
History of Present Illness





- History of Present Illness


History of Present Illness: 


PMD: None





Chief Complaint: seizure





The patient was seen and examined in the ED





HPI: The hx is obtained from the ED Physician and after review of the 

radiological, laboratory and medical records. This is a  58 years old male with 

hx of non compliance with medication, Alcohol abuse and seizure, last admitted 

on 8/30/18 and discharged on 9/5/18 with dx of Alcoholic withdrawal, Seizure and

metabolic encephalopathy. He is now brought to the ED with witnessed seizure by 

friend and this was repeated as reported by the EMS. In the ED the patient had 

another episode of seizure. Also in the ED the Bp was 183/111mmHG with a temp of

103F rectal. He received a total of 12mg of Ativan in the ED and was post Ictal 

on this examination.








PMH: Seizure





PSH: No Surgical hx





SH: Alcohol Abuse; Former Smoker; No illegal drug use





FH: Unknown Family Hx





Allergies: NKDA as per previous admission





Medication: Reviewed








Present on Admission





- Present on Admission


Any Indicators Present on Admission: No


History of DVT/PE: No


History of Uncontrolled Diabetes: No


Urinary Catheter: No


Decubitus Ulcer Present: No





Review of Systems





- Review of Systems


Systems not reviewed;Unavailable: Altered Mental Status


Review of Systems: 





rRview of system is limited as the patient is post ictal at this time.





Past Patient History





- Past Medical History & Family History


Past Medical History?: Yes





- Past Social History


Smoking Status: Former Smoker


Chewing Tobacco Use: No


Cigar Use: No


Alcohol: Other





- CARDIAC


Hx Cardiac Disorders: No





- PULMONARY


Hx Respiratory Disorders: No





- NEUROLOGICAL


Hx Seizures: Yes





- HEENT


Hx HEENT Problems: No





- RENAL


Hx Chronic Kidney Disease: No





- ENDOCRINE/METABOLIC


Hx Endocrine Disorders: No





- INTEGUMENTARY


Hx Dermatological Problems: No





- MUSCULOSKELETAL/RHEUMATOLOGICAL


Hx Musculoskeletal Disorders: No





- GASTROINTESTINAL


Hx Gastrointestinal Disorders: No





- GENITOURINARY/GYNECOLOGICAL


Hx Genitourinary Disorders: No





- PSYCHIATRIC


Hx Psychophysiologic Disorder: No


Hx Substance Use:  (unknown)





- SURGICAL HISTORY


Hx Surgeries: No





- ANESTHESIA


Hx Anesthesia: No





Meds


Allergies/Adverse Reactions: 


                                    Allergies











Allergy/AdvReac Type Severity Reaction Status Date / Time


 


Unobtainable Allergy   Verified 10/19/18 22:41














Physical Exam





- Constitutional


Additional comments: 





Post Ictal, restless, received total of 12mg Ativan





- Head Exam


Head Exam: ATRAUMATIC, NORMAL INSPECTION, NORMOCEPHALIC





- Eye Exam


Eye Exam: Normal appearance.  absent: Conjunctival injection, Periorbital 

swelling


Pupil Exam: NORMAL ACCOMODATION





- ENT Exam


ENT Exam: Mucous Membranes Dry, Normal Exam, Normal External Ear Exam





- Neck Exam


Neck exam: Positive for: Full Rom, Normal Inspection.  Negative for: 

Lymphadenopathy





- Respiratory Exam


Respiratory Exam: Clear to Auscultation Bilateral.  absent: Rales, Rhonchi, 

Wheezes





- Cardiovascular Exam


Cardiovascular Exam: REGULAR RHYTHM, RRR, +S1, +S2.  absent: Gallop





- GI/Abdominal Exam


GI & Abdominal Exam: Normal Bowel Sounds, Soft.  absent: Mass, Organomegaly





- Rectal Exam


Rectal Exam: Deferred





- Extremities Exam


Extremities exam: Positive for: normal inspection.  Negative for: joint 

swelling, pedal edema





- Back Exam


Back exam: NORMAL INSPECTION





- Neurological Exam


Neurological exam: CN II-XII Intact, Reflexes Normal


Additional comments: 





Lethargic, No facial droop, Moving all extremities





- Psychiatric Exam


Psychiatric exam: Agitated





- Skin


Skin Exam: Dry, Intact, Normal Color, Warm





Results





- Vital Signs


Recent Vital Signs: 





                                Last Vital Signs











Temp  103.4 F H  10/20/18 00:29


 


Pulse  97 H  10/20/18 02:03


 


Resp  18   10/20/18 02:03


 


BP  140/87   10/20/18 02:03


 


Pulse Ox  97   10/20/18 02:03














- Labs


Result Diagrams: 


                                 10/19/18 23:05





                                 10/19/18 23:05


Labs: 





                         Laboratory Results - last 24 hr











  10/19/18 10/19/18 10/19/18





  22:35 23:01 23:05


 


WBC   


 


RBC   


 


Hgb   


 


Hct   


 


MCV   


 


MCH   


 


MCHC   


 


RDW   


 


Plt Count   


 


MPV   


 


Neut % (Auto)   


 


Lymph % (Auto)   


 


Mono % (Auto)   


 


Eos % (Auto)   


 


Baso % (Auto)   


 


Neut # (Auto)   


 


Lymph # (Auto)   


 


Mono # (Auto)   


 


Eos # (Auto)   


 


Baso # (Auto)   


 


PT   


 


INR   


 


APTT   


 


pCO2   


 


pO2   62 H 


 


HCO3   


 


ABG pH   


 


ABG Total CO2   


 


ABG O2 Saturation   


 


ABG Base Excess   


 


Akshat Test   


 


ABG Potassium   


 


VBG pH   6.83 L* 


 


VBG pCO2   48 


 


VBG HCO3   3.6 


 


VBG Total CO2   9.5 L 


 


VBG O2 Sat (Calc)   79.7 H 


 


VBG Base Excess   -26.5 L 


 


A-a O2 Difference   


 


Sodium   115.0 L*  137


 


Chloride   85.0 L  96 L


 


Glucose   167 H 


 


Lactate   > 20.0 H* 


 


FiO2   21.0 


 


Crit Value Called To   Dr caridad quintero 


 


Crit Value Called By   Alfred 


 


Crit Value Read Back   Y 


 


Blood Gas Notified Time   2323 


 


Potassium    3.8


 


Carbon Dioxide    8 L*


 


Anion Gap    37 H


 


BUN    8 L


 


Creatinine    0.9


 


Est GFR ( Amer)    > 60


 


Est GFR (Non-Af Amer)    > 60


 


POC Glucose (mg/dL)  175 H  


 


Random Glucose    170 H


 


Calcium    9.6


 


Phosphorus    5.2 H


 


Magnesium    2.1


 


Total Bilirubin    1.3


 


AST    271 H


 


ALT    227 H


 


Alkaline Phosphatase    91


 


Ammonia   


 


Total Protein    10.4 H


 


Albumin    5.1 H


 


Globulin    5.3 H


 


Albumin/Globulin Ratio    1.0


 


Arterial Blood Potassium   


 


Urine Color   


 


Urine Clarity   


 


Urine pH   


 


Ur Specific Gravity   


 


Urine Protein   


 


Urine Glucose (UA)   


 


Urine Ketones   


 


Urine Blood   


 


Urine Nitrate   


 


Urine Bilirubin   


 


Urine Urobilinogen   


 


Ur Leukocyte Esterase   


 


Urine RBC (Auto)   


 


Urine Microscopic WBC   


 


Urine Opiates Screen   


 


Urine Methadone Screen   


 


Ur Barbiturates Screen   


 


Phenytoin   


 


Valproic Acid   


 


Ur Phencyclidine Scrn   


 


Ur Amphetamines Screen   


 


U Benzodiazepines Scrn   


 


U Oth Cocaine Metabols   


 


U Cannabinoids Screen   


 


Alcohol, Quantitative    < 10


 


Blood Type   


 


Antibody Screen   


 


BBK History Checked   














  10/19/18 10/19/18 10/19/18





  23:05 23:05 23:05


 


WBC  5.4  


 


RBC  5.02  


 


Hgb  14.7  


 


Hct  46.7  


 


MCV  93.2  


 


MCH  29.3  


 


MCHC  31.5 L  


 


RDW  13.9  


 


Plt Count  108 L  


 


MPV  10.9  


 


Neut % (Auto)  69.3  


 


Lymph % (Auto)  21.7  


 


Mono % (Auto)  8.5  


 


Eos % (Auto)  0.0  


 


Baso % (Auto)  0.5  


 


Neut # (Auto)  3.8  


 


Lymph # (Auto)  1.2  


 


Mono # (Auto)  0.5  


 


Eos # (Auto)  0.0  


 


Baso # (Auto)  0.0  


 


PT   12.6 


 


INR   1.1 


 


APTT   36.2 


 


pCO2   


 


pO2   


 


HCO3   


 


ABG pH   


 


ABG Total CO2   


 


ABG O2 Saturation   


 


ABG Base Excess   


 


Akshat Test   


 


ABG Potassium   


 


VBG pH   


 


VBG pCO2   


 


VBG HCO3   


 


VBG Total CO2   


 


VBG O2 Sat (Calc)   


 


VBG Base Excess   


 


A-a O2 Difference   


 


Sodium   


 


Chloride   


 


Glucose   


 


Lactate   


 


FiO2   


 


Crit Value Called To   


 


Crit Value Called By   


 


Crit Value Read Back   


 


Blood Gas Notified Time   


 


Potassium   


 


Carbon Dioxide   


 


Anion Gap   


 


BUN   


 


Creatinine   


 


Est GFR ( Amer)   


 


Est GFR (Non-Af Amer)   


 


POC Glucose (mg/dL)   


 


Random Glucose   


 


Calcium   


 


Phosphorus   


 


Magnesium   


 


Total Bilirubin   


 


AST   


 


ALT   


 


Alkaline Phosphatase   


 


Ammonia   


 


Total Protein   


 


Albumin   


 


Globulin   


 


Albumin/Globulin Ratio   


 


Arterial Blood Potassium   


 


Urine Color   


 


Urine Clarity   


 


Urine pH   


 


Ur Specific Gravity   


 


Urine Protein   


 


Urine Glucose (UA)   


 


Urine Ketones   


 


Urine Blood   


 


Urine Nitrate   


 


Urine Bilirubin   


 


Urine Urobilinogen   


 


Ur Leukocyte Esterase   


 


Urine RBC (Auto)   


 


Urine Microscopic WBC   


 


Urine Opiates Screen   


 


Urine Methadone Screen   


 


Ur Barbiturates Screen   


 


Phenytoin   


 


Valproic Acid   


 


Ur Phencyclidine Scrn   


 


Ur Amphetamines Screen   


 


U Benzodiazepines Scrn   


 


U Oth Cocaine Metabols   


 


U Cannabinoids Screen   


 


Alcohol, Quantitative   


 


Blood Type    A NEGATIVE


 


Antibody Screen    Negative


 


BBK History Checked    No verified bt














  10/19/18 10/19/18 10/19/18





  23:05 23:28 23:54


 


WBC   


 


RBC   


 


Hgb   


 


Hct   


 


MCV   


 


MCH   


 


MCHC   


 


RDW   


 


Plt Count   


 


MPV   


 


Neut % (Auto)   


 


Lymph % (Auto)   


 


Mono % (Auto)   


 


Eos % (Auto)   


 


Baso % (Auto)   


 


Neut # (Auto)   


 


Lymph # (Auto)   


 


Mono # (Auto)   


 


Eos # (Auto)   


 


Baso # (Auto)   


 


PT   


 


INR   


 


APTT   


 


pCO2   32 L 


 


pO2   181 H 


 


HCO3   21.7 


 


ABG pH   7.40 


 


ABG Total CO2   20.8 L 


 


ABG O2 Saturation   99.3 H 


 


ABG Base Excess   -4.0 L 


 


Akshat Test   Yes 


 


ABG Potassium   4.3 


 


VBG pH   


 


VBG pCO2   


 


VBG HCO3   


 


VBG Total CO2   


 


VBG O2 Sat (Calc)   


 


VBG Base Excess   


 


A-a O2 Difference   492.0 


 


Sodium   130.0 L 


 


Chloride   98.0 


 


Glucose   164 H 


 


Lactate   5.4 H* 


 


FiO2   100.0 


 


Crit Value Called To   Dr caridad qiuntero 


 


Crit Value Called By   Alfred 


 


Crit Value Read Back   Y 


 


Blood Gas Notified Time   2334 


 


Potassium   


 


Carbon Dioxide   


 


Anion Gap   


 


BUN   


 


Creatinine   


 


Est GFR ( Amer)   


 


Est GFR (Non-Af Amer)   


 


POC Glucose (mg/dL)   


 


Random Glucose   


 


Calcium   


 


Phosphorus   


 


Magnesium   


 


Total Bilirubin   


 


AST   


 


ALT   


 


Alkaline Phosphatase   


 


Ammonia   


 


Total Protein   


 


Albumin   


 


Globulin   


 


Albumin/Globulin Ratio   


 


Arterial Blood Potassium   4.3 


 


Urine Color   


 


Urine Clarity   


 


Urine pH   


 


Ur Specific Gravity   


 


Urine Protein   


 


Urine Glucose (UA)   


 


Urine Ketones   


 


Urine Blood   


 


Urine Nitrate   


 


Urine Bilirubin   


 


Urine Urobilinogen   


 


Ur Leukocyte Esterase   


 


Urine RBC (Auto)   


 


Urine Microscopic WBC   


 


Urine Opiates Screen    Negative


 


Urine Methadone Screen    Negative


 


Ur Barbiturates Screen    Negative


 


Phenytoin  < 3.0 L  


 


Valproic Acid  < 10.0 L  


 


Ur Phencyclidine Scrn    Negative


 


Ur Amphetamines Screen    Negative


 


U Benzodiazepines Scrn    Negative


 


U Oth Cocaine Metabols    Negative


 


U Cannabinoids Screen    Negative


 


Alcohol, Quantitative   


 


Blood Type   


 


Antibody Screen   


 


BBK History Checked   














  10/19/18 10/20/18





  23:54 00:51


 


WBC  


 


RBC  


 


Hgb  


 


Hct  


 


MCV  


 


MCH  


 


MCHC  


 


RDW  


 


Plt Count  


 


MPV  


 


Neut % (Auto)  


 


Lymph % (Auto)  


 


Mono % (Auto)  


 


Eos % (Auto)  


 


Baso % (Auto)  


 


Neut # (Auto)  


 


Lymph # (Auto)  


 


Mono # (Auto)  


 


Eos # (Auto)  


 


Baso # (Auto)  


 


PT  


 


INR  


 


APTT  


 


pCO2  


 


pO2  


 


HCO3  


 


ABG pH  


 


ABG Total CO2  


 


ABG O2 Saturation  


 


ABG Base Excess  


 


Akshat Test  


 


ABG Potassium  


 


VBG pH  


 


VBG pCO2  


 


VBG HCO3  


 


VBG Total CO2  


 


VBG O2 Sat (Calc)  


 


VBG Base Excess  


 


A-a O2 Difference  


 


Sodium  


 


Chloride  


 


Glucose  


 


Lactate  


 


FiO2  


 


Crit Value Called To  


 


Crit Value Called By  


 


Crit Value Read Back  


 


Blood Gas Notified Time  


 


Potassium  


 


Carbon Dioxide  


 


Anion Gap  


 


BUN  


 


Creatinine  


 


Est GFR ( Amer)  


 


Est GFR (Non-Af Amer)  


 


POC Glucose (mg/dL)  


 


Random Glucose  


 


Calcium  


 


Phosphorus  


 


Magnesium  


 


Total Bilirubin  


 


AST  


 


ALT  


 


Alkaline Phosphatase  


 


Ammonia  35 


 


Total Protein  


 


Albumin  


 


Globulin  


 


Albumin/Globulin Ratio  


 


Arterial Blood Potassium  


 


Urine Color   Yellow


 


Urine Clarity   Clear


 


Urine pH   6.0


 


Ur Specific Gravity   1.016


 


Urine Protein   100


 


Urine Glucose (UA)   50


 


Urine Ketones   Trace


 


Urine Blood   Moderate


 


Urine Nitrate   Negative


 


Urine Bilirubin   Negative


 


Urine Urobilinogen   0.2-1.0


 


Ur Leukocyte Esterase   Neg


 


Urine RBC (Auto)   1


 


Urine Microscopic WBC   2


 


Urine Opiates Screen  


 


Urine Methadone Screen  


 


Ur Barbiturates Screen  


 


Phenytoin  


 


Valproic Acid  


 


Ur Phencyclidine Scrn  


 


Ur Amphetamines Screen  


 


U Benzodiazepines Scrn  


 


U Oth Cocaine Metabols  


 


U Cannabinoids Screen  


 


Alcohol, Quantitative  


 


Blood Type  


 


Antibody Screen  


 


BBK History Checked  














- Imaging and Cardiology


  ** Chest x-ray


Status: Image reviewed by me


Additional comment: 





No infiltrate





  ** CT scan - head


Status: Image reviewed by me


Additional comment: 





No hemorrhage nor obvious cerebral infarction seen by me.


Follow up with official report.





Assessment & Plan





- Assessment and Plan (Free Text)


Assessment: 





#. Status Epilepticus


#. Alcohol Withdrawal


#. Elevated blood pressures


#. Lactic Acidosis


#. Elevated blood glucose


#. hx of Nicotine Addiction


Plan: 


 58 years old male with hx of non compliance with medication, Alcohol abuse and 

seizure, brought to the ED with  recurrent witnessed seizures,The last in the ED

, where the Bp was 183/111mmHG with a temp of 103F rectal. He received a total 

of 12mg of Ativan for the Seizures. 





#. Status Epilepticus due to non compliance of medication and most probably to 

Alcohol withdrawal. Keppra 750mg given in ED


- Consult Neurology Dr Mishra


-  Keppra 500mg Q12H


- Ativan for Seizure


- Ativan for Agitation


- Seizure precaution with bed rails up and padded 





#. Alcohol Withdrawal


- CIWA protocol


- Ativan for agitation and Tremors


- Start Librium when patient beginx to eat


- Banana bag which includes Thiamine, Folic Acid acid and Multivitamin dailw 

while patient is NPO


- Change to Oral Folic Acid and Thiamine when patient start to eat





#. Elevated blood pressures secondary to Alcohol withdrawal


- Hydralazine IV for SBP>150mmHG


- Start Clonidine when patient begin to use the oral route


- Follow blood Pressures





#. Lactic Acidosis from the seizure


- IV fluids


-Follow VBG with lactate





#. Elevated blood glucose


- HbA1c





#. Elevated Temperature probably due to Alcohol withdrawal.


- Blood and Urine Cultures


- Tylenol for Fever





#. Stress ulcer prophylaxis with Pepcid





#. DVT prophylaxis with SCD


- Start Lovenox if CT head is negative for bleed.





Code Status: Full





- Date & Time


Date: 10/20/18


Time: 02:49

## 2018-10-20 NOTE — CP.PCM.HP
<Bulmaro Araujo - Last Filed: 10/20/18 00:07>





Past Patient History





- Past Social History


Smoking Status: Never Smoked





- PSYCHIATRIC


Hx Substance Use:  (unknown)





Meds


Allergies/Adverse Reactions: 


                                    Allergies











Allergy/AdvReac Type Severity Reaction Status Date / Time


 


Unobtainable Allergy   Verified 10/19/18 22:41














Results





- Vital Signs


Recent Vital Signs: 





                                Last Vital Signs











Temp  100.9 F H  10/19/18 23:39


 


Pulse  132 H  10/19/18 22:40


 


Resp  19   10/19/18 22:40


 


BP  183/111 H  10/19/18 23:39


 


Pulse Ox  100   10/19/18 22:40














- Labs


Result Diagrams: 


                                 10/19/18 23:05





                                 10/19/18 23:05


Labs: 





                         Laboratory Results - last 24 hr











  10/19/18 10/19/18 10/19/18





  22:35 23:01 23:05


 


WBC   


 


RBC   


 


Hgb   


 


Hct   


 


MCV   


 


MCH   


 


MCHC   


 


RDW   


 


Plt Count   


 


MPV   


 


Neut % (Auto)   


 


Lymph % (Auto)   


 


Mono % (Auto)   


 


Eos % (Auto)   


 


Baso % (Auto)   


 


Neut # (Auto)   


 


Lymph # (Auto)   


 


Mono # (Auto)   


 


Eos # (Auto)   


 


Baso # (Auto)   


 


PT   


 


INR   


 


APTT   


 


pCO2   


 


pO2   62 H 


 


HCO3   


 


ABG pH   


 


ABG Total CO2   


 


ABG O2 Saturation   


 


ABG Base Excess   


 


Akshat Test   


 


ABG Potassium   


 


VBG pH   6.83 L* 


 


VBG pCO2   48 


 


VBG HCO3   3.6 


 


VBG Total CO2   9.5 L 


 


VBG O2 Sat (Calc)   79.7 H 


 


VBG Base Excess   -26.5 L 


 


A-a O2 Difference   


 


Sodium   115.0 L*  137


 


Chloride   85.0 L  96 L


 


Glucose   167 H 


 


Lactate   > 20.0 H* 


 


FiO2   21.0 


 


Crit Value Called To   Dr caridad gaviria 


 


Crit Value Called By   Alfred 


 


Crit Value Read Back   Y 


 


Blood Gas Notified Time   2323 


 


Potassium    3.8


 


Carbon Dioxide    8 L*


 


Anion Gap    37 H


 


BUN    8 L


 


Creatinine    0.9


 


Est GFR ( Amer)    > 60


 


Est GFR (Non-Af Amer)    > 60


 


POC Glucose (mg/dL)  175 H  


 


Random Glucose    170 H


 


Calcium    9.6


 


Phosphorus    5.2 H


 


Magnesium    2.1


 


Total Bilirubin    1.3


 


AST    271 H


 


ALT    227 H


 


Alkaline Phosphatase    91


 


Ammonia   


 


Total Protein    10.4 H


 


Albumin    5.1 H


 


Globulin    5.3 H


 


Albumin/Globulin Ratio    1.0


 


Arterial Blood Potassium   


 


Phenytoin   


 


Valproic Acid   


 


Alcohol, Quantitative    < 10














  10/19/18 10/19/18 10/19/18





  23:05 23:05 23:05


 


WBC  5.4  


 


RBC  5.02  


 


Hgb  14.7  


 


Hct  46.7  


 


MCV  93.2  


 


MCH  29.3  


 


MCHC  31.5 L  


 


RDW  13.9  


 


Plt Count  108 L  


 


MPV  10.9  


 


Neut % (Auto)  69.3  


 


Lymph % (Auto)  21.7  


 


Mono % (Auto)  8.5  


 


Eos % (Auto)  0.0  


 


Baso % (Auto)  0.5  


 


Neut # (Auto)  3.8  


 


Lymph # (Auto)  1.2  


 


Mono # (Auto)  0.5  


 


Eos # (Auto)  0.0  


 


Baso # (Auto)  0.0  


 


PT   12.6 


 


INR   1.1 


 


APTT   36.2 


 


pCO2   


 


pO2   


 


HCO3   


 


ABG pH   


 


ABG Total CO2   


 


ABG O2 Saturation   


 


ABG Base Excess   


 


Akshat Test   


 


ABG Potassium   


 


VBG pH   


 


VBG pCO2   


 


VBG HCO3   


 


VBG Total CO2   


 


VBG O2 Sat (Calc)   


 


VBG Base Excess   


 


A-a O2 Difference   


 


Sodium   


 


Chloride   


 


Glucose   


 


Lactate   


 


FiO2   


 


Crit Value Called To   


 


Crit Value Called By   


 


Crit Value Read Back   


 


Blood Gas Notified Time   


 


Potassium   


 


Carbon Dioxide   


 


Anion Gap   


 


BUN   


 


Creatinine   


 


Est GFR ( Amer)   


 


Est GFR (Non-Af Amer)   


 


POC Glucose (mg/dL)   


 


Random Glucose   


 


Calcium   


 


Phosphorus   


 


Magnesium   


 


Total Bilirubin   


 


AST   


 


ALT   


 


Alkaline Phosphatase   


 


Ammonia   


 


Total Protein   


 


Albumin   


 


Globulin   


 


Albumin/Globulin Ratio   


 


Arterial Blood Potassium   


 


Phenytoin    < 3.0 L


 


Valproic Acid    < 10.0 L


 


Alcohol, Quantitative   














  10/19/18 10/19/18





  23:28 23:54


 


WBC  


 


RBC  


 


Hgb  


 


Hct  


 


MCV  


 


MCH  


 


MCHC  


 


RDW  


 


Plt Count  


 


MPV  


 


Neut % (Auto)  


 


Lymph % (Auto)  


 


Mono % (Auto)  


 


Eos % (Auto)  


 


Baso % (Auto)  


 


Neut # (Auto)  


 


Lymph # (Auto)  


 


Mono # (Auto)  


 


Eos # (Auto)  


 


Baso # (Auto)  


 


PT  


 


INR  


 


APTT  


 


pCO2  32 L 


 


pO2  181 H 


 


HCO3  21.7 


 


ABG pH  7.40 


 


ABG Total CO2  20.8 L 


 


ABG O2 Saturation  99.3 H 


 


ABG Base Excess  -4.0 L 


 


Akshat Test  Yes 


 


ABG Potassium  4.3 


 


VBG pH  


 


VBG pCO2  


 


VBG HCO3  


 


VBG Total CO2  


 


VBG O2 Sat (Calc)  


 


VBG Base Excess  


 


A-a O2 Difference  492.0 


 


Sodium  130.0 L 


 


Chloride  98.0 


 


Glucose  164 H 


 


Lactate  5.4 H* 


 


FiO2  100.0 


 


Crit Value Called To  Dr caridad gaviria 


 


Crit Value Called By  Alfred 


 


Crit Value Read Back  Y 


 


Blood Gas Notified Time  2334 


 


Potassium  


 


Carbon Dioxide  


 


Anion Gap  


 


BUN  


 


Creatinine  


 


Est GFR ( Amer)  


 


Est GFR (Non-Af Amer)  


 


POC Glucose (mg/dL)  


 


Random Glucose  


 


Calcium  


 


Phosphorus  


 


Magnesium  


 


Total Bilirubin  


 


AST  


 


ALT  


 


Alkaline Phosphatase  


 


Ammonia   35


 


Total Protein  


 


Albumin  


 


Globulin  


 


Albumin/Globulin Ratio  


 


Arterial Blood Potassium  4.3 


 


Phenytoin  


 


Valproic Acid  


 


Alcohol, Quantitative  














Assessment & Plan





- Date & Time


Date: 10/20/18


Time: 00:07





<Caridad Gaviria J - Last Filed: 10/20/18 00:37>





History of Present Illness





- History of Present Illness


History of Present Illness: 


59 y/o male brought in by EMS from patient's home for evaluation of a seizure, 

onset prior to arrival. On arrival, EMS reports patient was altered but then had

a seizure en route. Patient is unable to give any history. EMS additionally re

port that a friend told them he had a history of seizure and had not taken his 

medications. 





PMD: Unknown





Review of Systems





- Review of Systems


Systems not reviewed;Unavailable: Altered Mental Status





Results





- Vital Signs


Recent Vital Signs: 





                                Last Vital Signs











Temp  103.4 F H  10/20/18 00:29


 


Pulse  125 H  10/20/18 00:29


 


Resp  19   10/20/18 00:29


 


BP  160/102 H  10/20/18 00:29


 


Pulse Ox  100   10/20/18 00:29














- Labs


Result Diagrams: 


                                 10/19/18 23:05





                                 10/19/18 23:05


Labs: 





                         Laboratory Results - last 24 hr











  10/19/18 10/19/18 10/19/18





  22:35 23:01 23:05


 


WBC   


 


RBC   


 


Hgb   


 


Hct   


 


MCV   


 


MCH   


 


MCHC   


 


RDW   


 


Plt Count   


 


MPV   


 


Neut % (Auto)   


 


Lymph % (Auto)   


 


Mono % (Auto)   


 


Eos % (Auto)   


 


Baso % (Auto)   


 


Neut # (Auto)   


 


Lymph # (Auto)   


 


Mono # (Auto)   


 


Eos # (Auto)   


 


Baso # (Auto)   


 


PT   


 


INR   


 


APTT   


 


pCO2   


 


pO2   62 H 


 


HCO3   


 


ABG pH   


 


ABG Total CO2   


 


ABG O2 Saturation   


 


ABG Base Excess   


 


Akshat Test   


 


ABG Potassium   


 


VBG pH   6.83 L* 


 


VBG pCO2   48 


 


VBG HCO3   3.6 


 


VBG Total CO2   9.5 L 


 


VBG O2 Sat (Calc)   79.7 H 


 


VBG Base Excess   -26.5 L 


 


A-a O2 Difference   


 


Sodium   115.0 L*  137


 


Chloride   85.0 L  96 L


 


Glucose   167 H 


 


Lactate   > 20.0 H* 


 


FiO2   21.0 


 


Crit Value Called To   Dr caridad gaviria 


 


Crit Value Called By   Alfred 


 


Crit Value Read Back   Y 


 


Blood Gas Notified Time   2323 


 


Potassium    3.8


 


Carbon Dioxide    8 L*


 


Anion Gap    37 H


 


BUN    8 L


 


Creatinine    0.9


 


Est GFR ( Amer)    > 60


 


Est GFR (Non-Af Amer)    > 60


 


POC Glucose (mg/dL)  175 H  


 


Random Glucose    170 H


 


Calcium    9.6


 


Phosphorus    5.2 H


 


Magnesium    2.1


 


Total Bilirubin    1.3


 


AST    271 H


 


ALT    227 H


 


Alkaline Phosphatase    91


 


Ammonia   


 


Total Protein    10.4 H


 


Albumin    5.1 H


 


Globulin    5.3 H


 


Albumin/Globulin Ratio    1.0


 


Arterial Blood Potassium   


 


Urine Opiates Screen   


 


Urine Methadone Screen   


 


Ur Barbiturates Screen   


 


Phenytoin   


 


Valproic Acid   


 


Ur Phencyclidine Scrn   


 


Ur Amphetamines Screen   


 


U Benzodiazepines Scrn   


 


U Oth Cocaine Metabols   


 


U Cannabinoids Screen   


 


Alcohol, Quantitative    < 10


 


Blood Type   


 


Antibody Screen   


 


BBK History Checked   














  10/19/18 10/19/18 10/19/18





  23:05 23:05 23:05


 


WBC  5.4  


 


RBC  5.02  


 


Hgb  14.7  


 


Hct  46.7  


 


MCV  93.2  


 


MCH  29.3  


 


MCHC  31.5 L  


 


RDW  13.9  


 


Plt Count  108 L  


 


MPV  10.9  


 


Neut % (Auto)  69.3  


 


Lymph % (Auto)  21.7  


 


Mono % (Auto)  8.5  


 


Eos % (Auto)  0.0  


 


Baso % (Auto)  0.5  


 


Neut # (Auto)  3.8  


 


Lymph # (Auto)  1.2  


 


Mono # (Auto)  0.5  


 


Eos # (Auto)  0.0  


 


Baso # (Auto)  0.0  


 


PT   12.6 


 


INR   1.1 


 


APTT   36.2 


 


pCO2   


 


pO2   


 


HCO3   


 


ABG pH   


 


ABG Total CO2   


 


ABG O2 Saturation   


 


ABG Base Excess   


 


Akshat Test   


 


ABG Potassium   


 


VBG pH   


 


VBG pCO2   


 


VBG HCO3   


 


VBG Total CO2   


 


VBG O2 Sat (Calc)   


 


VBG Base Excess   


 


A-a O2 Difference   


 


Sodium   


 


Chloride   


 


Glucose   


 


Lactate   


 


FiO2   


 


Crit Value Called To   


 


Crit Value Called By   


 


Crit Value Read Back   


 


Blood Gas Notified Time   


 


Potassium   


 


Carbon Dioxide   


 


Anion Gap   


 


BUN   


 


Creatinine   


 


Est GFR ( Amer)   


 


Est GFR (Non-Af Amer)   


 


POC Glucose (mg/dL)   


 


Random Glucose   


 


Calcium   


 


Phosphorus   


 


Magnesium   


 


Total Bilirubin   


 


AST   


 


ALT   


 


Alkaline Phosphatase   


 


Ammonia   


 


Total Protein   


 


Albumin   


 


Globulin   


 


Albumin/Globulin Ratio   


 


Arterial Blood Potassium   


 


Urine Opiates Screen   


 


Urine Methadone Screen   


 


Ur Barbiturates Screen   


 


Phenytoin   


 


Valproic Acid   


 


Ur Phencyclidine Scrn   


 


Ur Amphetamines Screen   


 


U Benzodiazepines Scrn   


 


U Oth Cocaine Metabols   


 


U Cannabinoids Screen   


 


Alcohol, Quantitative   


 


Blood Type    A NEGATIVE


 


Antibody Screen    Negative


 


BBK History Checked    No verified bt














  10/19/18 10/19/18 10/19/18





  23:05 23:28 23:54


 


WBC   


 


RBC   


 


Hgb   


 


Hct   


 


MCV   


 


MCH   


 


MCHC   


 


RDW   


 


Plt Count   


 


MPV   


 


Neut % (Auto)   


 


Lymph % (Auto)   


 


Mono % (Auto)   


 


Eos % (Auto)   


 


Baso % (Auto)   


 


Neut # (Auto)   


 


Lymph # (Auto)   


 


Mono # (Auto)   


 


Eos # (Auto)   


 


Baso # (Auto)   


 


PT   


 


INR   


 


APTT   


 


pCO2   32 L 


 


pO2   181 H 


 


HCO3   21.7 


 


ABG pH   7.40 


 


ABG Total CO2   20.8 L 


 


ABG O2 Saturation   99.3 H 


 


ABG Base Excess   -4.0 L 


 


Akshat Test   Yes 


 


ABG Potassium   4.3 


 


VBG pH   


 


VBG pCO2   


 


VBG HCO3   


 


VBG Total CO2   


 


VBG O2 Sat (Calc)   


 


VBG Base Excess   


 


A-a O2 Difference   492.0 


 


Sodium   130.0 L 


 


Chloride   98.0 


 


Glucose   164 H 


 


Lactate   5.4 H* 


 


FiO2   100.0 


 


Crit Value Called To   Dr caridad gaviria 


 


Crit Value Called By   Alfred 


 


Crit Value Read Back   Y 


 


Blood Gas Notified Time   2334 


 


Potassium   


 


Carbon Dioxide   


 


Anion Gap   


 


BUN   


 


Creatinine   


 


Est GFR ( Amer)   


 


Est GFR (Non-Af Amer)   


 


POC Glucose (mg/dL)   


 


Random Glucose   


 


Calcium   


 


Phosphorus   


 


Magnesium   


 


Total Bilirubin   


 


AST   


 


ALT   


 


Alkaline Phosphatase   


 


Ammonia   


 


Total Protein   


 


Albumin   


 


Globulin   


 


Albumin/Globulin Ratio   


 


Arterial Blood Potassium   4.3 


 


Urine Opiates Screen    Negative


 


Urine Methadone Screen    Negative


 


Ur Barbiturates Screen    Negative


 


Phenytoin  < 3.0 L  


 


Valproic Acid  < 10.0 L  


 


Ur Phencyclidine Scrn    Negative


 


Ur Amphetamines Screen    Negative


 


U Benzodiazepines Scrn    Negative


 


U Oth Cocaine Metabols    Negative


 


U Cannabinoids Screen    Negative


 


Alcohol, Quantitative   


 


Blood Type   


 


Antibody Screen   


 


BBK History Checked   














  10/19/18





  23:54


 


WBC 


 


RBC 


 


Hgb 


 


Hct 


 


MCV 


 


MCH 


 


MCHC 


 


RDW 


 


Plt Count 


 


MPV 


 


Neut % (Auto) 


 


Lymph % (Auto) 


 


Mono % (Auto) 


 


Eos % (Auto) 


 


Baso % (Auto) 


 


Neut # (Auto) 


 


Lymph # (Auto) 


 


Mono # (Auto) 


 


Eos # (Auto) 


 


Baso # (Auto) 


 


PT 


 


INR 


 


APTT 


 


pCO2 


 


pO2 


 


HCO3 


 


ABG pH 


 


ABG Total CO2 


 


ABG O2 Saturation 


 


ABG Base Excess 


 


Akshat Test 


 


ABG Potassium 


 


VBG pH 


 


VBG pCO2 


 


VBG HCO3 


 


VBG Total CO2 


 


VBG O2 Sat (Calc) 


 


VBG Base Excess 


 


A-a O2 Difference 


 


Sodium 


 


Chloride 


 


Glucose 


 


Lactate 


 


FiO2 


 


Crit Value Called To 


 


Crit Value Called By 


 


Crit Value Read Back 


 


Blood Gas Notified Time 


 


Potassium 


 


Carbon Dioxide 


 


Anion Gap 


 


BUN 


 


Creatinine 


 


Est GFR ( Amer) 


 


Est GFR (Non-Af Amer) 


 


POC Glucose (mg/dL) 


 


Random Glucose 


 


Calcium 


 


Phosphorus 


 


Magnesium 


 


Total Bilirubin 


 


AST 


 


ALT 


 


Alkaline Phosphatase 


 


Ammonia  35


 


Total Protein 


 


Albumin 


 


Globulin 


 


Albumin/Globulin Ratio 


 


Arterial Blood Potassium 


 


Urine Opiates Screen 


 


Urine Methadone Screen 


 


Ur Barbiturates Screen 


 


Phenytoin 


 


Valproic Acid 


 


Ur Phencyclidine Scrn 


 


Ur Amphetamines Screen 


 


U Benzodiazepines Scrn 


 


U Oth Cocaine Metabols 


 


U Cannabinoids Screen 


 


Alcohol, Quantitative 


 


Blood Type 


 


Antibody Screen 


 


BBK History Checked

## 2018-10-20 NOTE — CARD
--------------- APPROVED REPORT --------------





Date of service: 10/19/2018



EKG Measurement

Heart [id number]

SD [id number]

[id number]

[id number]

[id number]



<Conclusion>

Sinus tachycardia

Possible Left atrial enlargement

ST depression, consider inferolateral ischemia

Abnormal ECG

## 2018-10-20 NOTE — ED PDOC
HPI: Seizure


Time Seen by Provider: 10/19/18 22:44


Chief Complaint (Nursing): Seizure


Chief Complaint (Provider): Seizure


History Per: EMS


History/Exam Limitations: clinical condition


Number Of Seizures: Multiple


Additional History Per: Patient


Additional Complaint(s): 


57 y/o male brought in by EMS from patient's home for evaluation of a seizure, 

onset prior to arrival. On arrival, EMS reports patient was altered but then had

a seizure en route. Patient is unable to give any history. EMS additionally 

report that a friend told them he had a history of seizure and had not taken his

medications. 





PMD: Unknown





Past Medical History


Reviewed: Historical Data, Nursing Documentation, Vital Signs


Vital Signs: 





                                Last Vital Signs











Temp  103.4 F H  10/20/18 00:29


 


Pulse  125 H  10/20/18 00:29


 


Resp  19   10/20/18 00:29


 


BP  160/102 H  10/20/18 00:29


 


Pulse Ox  100   10/20/18 00:29














- Medical History


PMH: No Chronic Diseases





- Surgical History


Surgical History: No Surg Hx





- Family History


Family History: States: Unknown Family Hx





- Allergies


Allergies/Adverse Reactions: 


                                    Allergies











Allergy/AdvReac Type Severity Reaction Status Date / Time


 


Unobtainable Allergy   Verified 10/19/18 22:41














Review of Systems


ROS Statement: Except As Marked, All Systems Reviewed And Found Negative


Neurological: Positive for: Seizures, Altered Mental Status





Physical Exam





- Reviewed


Nursing Documentation Reviewed: Yes


Vital Signs Reviewed: Yes





- Physical Exam


Appears: Positive for: In Acute Distress (in acute neurological distress. 

seizing in the ER)


Head Exam: Positive for: ATRAUMATIC, NORMOCEPHALIC


Skin: Positive for: Warm, Dry


Eye Exam: Positive for: EOMI, PERRL, Nystagmus


ENT: Positive for: Other (Dry Mucous Membranes)


Neck: Positive for: Painless ROM, Supple


Cardiovascular/Chest: Positive for: Tachycardia (regular rhythm)


Respiratory: Negative for: Wheezing, Respiratory Distress


Gastrointestinal/Abdominal: Positive for: Soft.  Negative for: Mass, Distended


Back: Positive for: Normal Inspection.  Negative for: Decreased ROM


Extremity: Positive for: Other (Strength 5/5 in all extremities).  Negative for:

Deformity, Swelling


Lymphatic: Negative for: Adenopathy


Neurologic/Psych: Negative for: CNs II-XII (not able to fully assess CNs due to 

seizure), Oriented





- Laboratory Results


Result Diagrams: 


                                 10/21/18 05:30





                                 10/21/18 05:30





- ECG


O2 Sat by Pulse Oximetry: 100 (RA)


Pulse Ox Interpretation: Normal





- Critical Care


Total Time (In Min): 30


Documented Critical Care: Time excludes all time spent performint seperately 

billable procedures





Medical Decision Making


Medical Decision Making: 


Time: 2305


-- At bedside immediately for seizure. Patient given multiple episode of ativan.




-- Patient very altered during post-ictal state, trying to pull at lines. Unable

to redirect. 


-- Restraints ordered for safety


-- Patient had a previous visit with similar presentation on August 30, 2017 . 

Patient at that time, MRN [id number] and diagnosed with DTs and seizure. Patient 

required precedex for sedation


-- Discussed with Dr. Araujo, \[facility]\"" for ICU placement and admission. 


-- Labs consistent with dehydration likely due to multiple seizures. 





-- Type and Screen


-- ABG


-- VBG


-- CT Head w/o Contrast


-- EKG


-- Alcohol Serum


-- Ammonia 


-- CMP


-- Dilatin


-- Urine Drug Screen


-- Magnesium


-- Phosphorus 


-- Valproic Acid


-- CBC with differentials


-- PTT


-- Prothrombin Time


-- Chest x-ray Portable


-- Glucose, POC 


-- Ativan 3 mg IVP


-- Dextrose 1000 ml 


Multivitamin 10 ml 


Thiamine 100 mg


Folic Acid 1 mg  mls/hr


-- Keppra 750


Sodium Chloride 100 ml IVPB


-- Acetaminophen 650 mg WI


-- Thiamine 100 mg IV


-- Blood Culture


-- Urine Culture


-- Cardiac Monitor


-- IV Insertion


-- Glucose, Blood, POC


-- Evangelista


-- Restraints 


-- Urinalysis


 

________________________________________________________________________________


__


Scribe Attestation:


Documented by Wing Rivera, acting as a scribe for Alma Gaviria MD.





Provider Scribe Attestation:


All medical record entries made by the Scribe were at my direction and 

personally dictated by me. I have reviewed the chart and agree that the record 

accurately reflects my personal performance of the history, physical exam, 

medical decision making, and the department course for this patient. I have also

personally directed, reviewed, and agree with the discharge instructions and 

disposition.





Disposition





- Clinical Impression


Clinical Impression: 


 Alcohol withdrawal seizure, Delirium tremens





Counseled Patient/Family Regarding: Studies Performed, Diagnosis





- Disposition


Disposition Time: 00:30


Condition: GUARDED





- Pt Status Changed To:


Hospital Disposition Of: Inpatient





- Admit Certification


Admit to Inpatient:: After my assessment, the patient will require 

hospitalization for at least two midnights.  This is because of the severity of 

symptoms shown, intensity of services needed, and/or the medical risk in this 

patient being treated as an outpatient.





- POA


Present On Arrival: Falls Or Trauma

## 2018-10-20 NOTE — CT
Date of service: 



10/20/2018



PROCEDURE:  CT HEAD WITHOUT CONTRAST.



HISTORY:

Seizure. 



COMPARISON:

None available.



TECHNIQUE:

Axial computed tomography images were obtained through the head/brain 

without intravenous contrast.  



Radiation dose:



Total exam DLP = 921.05 mGy-cm.



This CT exam was performed using one or more of the following dose 

reduction techniques: Automated exposure control, adjustment of the 

mA and/or kV according to patient size, and/or use of iterative 

reconstruction technique.



FINDINGS:

Note the examination is limited by motion artifact 



HEMORRHAGE:

No acute parenchymal, subarachnoid nor extra-axial hemorrhage. 



BRAIN:

Mild diffuse/confluent chronic periventricular white matter ischemic 

changes with multiple scattered chronic bilateral basal nuclei 

lacunar type infarcts 



No obvious parenchymal nor extra-axial mass or collection seen on 

this noncontrast study. 



Moderate generalized volume loss. 



Vascular calcifications both carotid siphons and right vertebral 

artery 



VENTRICLES:

No obstructive hydrocephalus. 



CALVARIUM:

Unremarkable.



PARANASAL SINUSES:

Mild mucosal thickening seen within the right and to a lesser degree 

left maxillary antra the 



MASTOID AIR CELLS:

Unremarkable as visualized. No inflammatory changes.



OTHER FINDINGS:

None.



IMPRESSION:

Limited motion degraded study. 



No acute intracranial hemorrhage.  Mild chronic white matter ischemic 

changes with multiple chronic bilateral basal nuclei lacunar type 

infarcts. 



Moderate generalized volume loss.

## 2018-10-20 NOTE — RAD
Date of service: 



10/19/2018



HISTORY:

Seizure 



COMPARISON:

No prior. 



FINDINGS:



LUNGS:

Minor bibasilar atelectasis 



PLEURA:

No significant pleural effusion identified, no pneumothorax apparent.



CARDIOVASCULAR:

Minor aortic atherosclerotic calcification present



Normal.



OSSEOUS STRUCTURES:

No significant abnormalities.



VISUALIZED UPPER ABDOMEN:

Normal.



OTHER FINDINGS:

None.



IMPRESSION:

Minor bibasilar atelectasis

## 2018-10-21 LAB
ALBUMIN SERPL-MCNC: 3.3 G/DL (ref 3.5–5)
ALBUMIN/GLOB SERPL: 0.8 {RATIO} (ref 1–2.1)
ALT SERPL-CCNC: 150 U/L (ref 21–72)
AST SERPL-CCNC: 166 U/L (ref 17–59)
BASE EXCESS BLDV CALC-SCNC: 1.4 MMOL/L (ref 0–2)
BUN SERPL-MCNC: 12 MG/DL (ref 9–20)
CALCIUM SERPL-MCNC: 8.6 MG/DL (ref 8.4–10.2)
ERYTHROCYTE [DISTWIDTH] IN BLOOD BY AUTOMATED COUNT: 13.1 % (ref 11.5–14.5)
GFR NON-AFRICAN AMERICAN: > 60
HGB BLD-MCNC: 13.8 G/DL (ref 12–18)
MCH RBC QN AUTO: 29.7 PG (ref 27–31)
MCHC RBC AUTO-ENTMCNC: 33.2 G/DL (ref 33–37)
MCV RBC AUTO: 89.5 FL (ref 80–94)
PCO2 BLDV: 35 MMHG (ref 40–60)
PH BLDV: 7.46 [PH] (ref 7.32–7.43)
PLATELET # BLD: 88 K/UL (ref 130–400)
RBC # BLD AUTO: 4.66 MIL/UL (ref 4.4–5.9)
VENOUS BLOOD FIO2: 21 %
VENOUS BLOOD GAS PO2: 63 MM/HG (ref 30–55)
WBC # BLD AUTO: 5.3 K/UL (ref 4.8–10.8)

## 2018-10-21 RX ADMIN — ENOXAPARIN SODIUM SCH MG: 40 INJECTION SUBCUTANEOUS at 09:35

## 2018-10-21 RX ADMIN — THIAMINE HYDROCHLORIDE SCH MG: 100 INJECTION, SOLUTION INTRAMUSCULAR; INTRAVENOUS at 09:38

## 2018-10-21 NOTE — CP.CCUPN
CCU Subjective





- Physician Review


Events Since Last Encounter (Free Text): 





Patient awake, no distress, no fever, no vomiting, events reviewed








CCU Objective





- Vital Signs / Intake & Output


Vital Signs (Last 4 hours): 


Vital Signs











  Temp Pulse Resp BP Pulse Ox


 


 10/21/18 07:00   72  18  108/70  100


 


 10/21/18 06:00   58 L  16  117/76  100


 


 10/21/18 04:17   63   106/65 


 


 10/21/18 04:00  98.7 F  63  14  106/65  100











Intake and Output (Last 8hrs): 


                                 Intake & Output











 10/20/18 10/21/18 10/21/18





 22:59 06:59 14:59


 


Intake Total 250 1000 


 


Output Total  350 


 


Balance 250 650 


 


Weight 144 lb 6 oz 144 lb 6 oz 


 


Intake:   


 


   1000 


 


Output:   


 


  Urine  350 


 


    Urethral (Evangelista)  350 














- Physical Exam


Head: Positive for: Atraumatic, Normocephalic


Pupils: Positive for: PERRL


Conjunctiva: Positive for: Normal


Mouth: Positive for: Moist Mucous Membranes


Pharnyx: Positive for: Normal


Nose (Internal): Positive for: Normal Inspection


Neck: Positive for: Normal Range of Motion


Respiratory/Chest: Positive for: Clear to Auscultation


Cardiovascular: Positive for: Regular Rate and Rhythm


Abdomen: Positive for: Normal Bowel Sounds


Upper Extremity: Positive for: Normal Inspection


Lower Extremity: Positive for: Normal Inspection


Skin: Positive for: Normal Color


Psychiatric: Positive for: Alert





- Medications


Active Medications: 


Active Medications











Generic Name Dose Route Start Last Admin





  Trade Name Freq  PRN Reason Stop Dose Admin


 


Acetaminophen  650 mg  10/20/18 01:40  10/20/18 04:35





  Tylenol 650 Mg Supp  [place]   650 mg





  Q4 PRN   Administration





  Fever >100.4 F   





     





     





     


 


Chlordiazepoxide  50 mg  10/20/18 22:00  10/21/18 04:17





  Librium  PO   Not Given





  Q6 [facility]   





     





     





     





     


 


Enoxaparin Sodium  40 mg  10/21/18 09:00  





  Lovenox  SC   





  DAILY [facility]   





     





     





  Protocol   





     


 


Famotidine  20 mg  10/20/18 09:00  10/20/18 09:22





  Pepcid  IVP   20 mg





  DAILY [facility]   Administration





     





     





     





     


 


Hydralazine HCl  10 mg  10/20/18 04:00  10/21/18 04:17





  Apresoline  IV   Not Given





  Q6 [facility]   





     





     





     





     


 


Levetiracetam 500 mg/ Sodium  105 mls @ 210 mls/hr  10/20/18 09:00  10/20/18 

20:56





  Chloride  IVPB   210 mls/hr





  Q12 [facility]   Administration





     





     





     





     


 


Lactated Ringer's  1,000 mls @ 125 mls/hr  10/20/18 02:00  10/20/18 08:33





  Lactated Ringer's  IV   125 mls/hr





  .Q8H [facility]   Administration





     





     





     





     


 


Dexmedetomidine HCl 400 mcg/  100 mls @ 3.86 mls/hr  10/20/18 22:08  10/21/18 

06:30





  Sodium Chloride  IV  10/21/18 22:07  0.3 mcg/kg/hr





  .Q24H ONE   5.78 mls/hr





     Titration





     





  Protocol   





  0.2 MCG/KG/HR   


 


Potassium Chloride  100 mls @ 50 mls/hr  10/21/18 06:33  10/21/18 07:03





  Potassium Chloride 20 Meq/100 Ml  IVPB  10/21/18 08:32  50 mls/hr





  Q2 STA   Administration





     





     





     





     


 


Lorazepam  2 mg  10/20/18 01:38  





  Ativan  IVP   





  Q3H PRN   





  Seizure activity   





     





     





     


 


Lorazepam  1 mg  10/20/18 01:39  10/20/18 18:41





  Ativan  IVP   1 mg





  Q3H PRN   Administration





  Symptoms of alcohol withdrawl   





     





     





     


 


Thiamine HCl  100 mg  10/20/18 09:00  10/20/18 09:22





  Vitamin B1 Inj  IM  10/23/18 09:01  100 mg





  DAILY [facility]   Administration





     





     





     





     














- Patient Studies


Lab Studies: 


                                   Lab Studies











  10/21/18 10/21/18 10/21/18 Range/Units





  06:38 05:30 05:30 


 


WBC    5.3  (4.8-10.8)  K/uL


 


RBC    4.66  (4.40-5.90)  Mil/uL


 


Hgb    13.8  (12.0-18.0)  g/dL


 


Hct    41.6  (35.0-51.0)  %


 


MCV    89.5  D  (80.0-94.0)  fl


 


MCH    29.7  (27.0-31.0)  pg


 


MCHC    33.2  (33.0-37.0)  g/dL


 


RDW    13.1  (11.5-14.5)  %


 


pO2  63 H    (30-55)  mm/Hg


 


VBG pH  7.46 H    (7.32-7.43)  


 


VBG pCO2  35 L    (40-60)  mmHg


 


VBG HCO3  25.9    mmol/L


 


VBG Total CO2  26.0    (22-28)  mmol/L


 


VBG O2 Sat (Calc)  95.2 H    (40-65)  %


 


VBG Base Excess  1.4    (0.0-2.0)  mmol/L


 


VBG Potassium  3.4 L    (3.6-5.2)  mmol/L


 


Glucose  119 H    ()  mg/dL


 


Lactate  1.4    (0.7-2.1)  mmol/L


 


FiO2  21.0    %


 


Sodium  132.0  138   (132-148)  mmol/l


 


Potassium   3.5 L   (3.6-5.0)  MMOL/L


 


Chloride  103.0  107   ()  mmol/L


 


Carbon Dioxide   25   (22-30)  mmol/L


 


Anion Gap   10   (10-20)  


 


BUN   12   (9-20)  mg/dl


 


Creatinine   0.6 L   (0.8-1.5)  mg/dl


 


Est GFR (African Amer)   > 60   


 


Est GFR (Non-Af Amer)   > 60   


 


Random Glucose   116 H   ()  mg/dL


 


Calcium   8.6   (8.4-10.2)  mg/dL


 


Total Bilirubin   2.0 H   (0.2-1.3)  mg/dl


 


AST   166 H D   (17-59)  U/L


 


ALT   150 H D   (21-72)  U/L


 


Alkaline Phosphatase   53   ()  U/L


 


Total Protein   7.2   (6.3-8.2)  G/DL


 


Albumin   3.3 L D   (3.5-5.0)  g/dL


 


Globulin   3.9   (2.2-3.9)  gm/dL


 


Albumin/Globulin Ratio   0.8 L   (1.0-2.1)  


 


Venous Blood Potassium  3.4 L    (3.6-5.2)  mmol/L














  10/20/18 Range/Units





  08:30 


 


WBC   (4.8-10.8)  K/uL


 


RBC   (4.40-5.90)  Mil/uL


 


Hgb   (12.0-18.0)  g/dL


 


Hct   (35.0-51.0)  %


 


MCV   (80.0-94.0)  fl


 


MCH   (27.0-31.0)  pg


 


MCHC   (33.0-37.0)  g/dL


 


RDW   (11.5-14.5)  %


 


pO2   (30-55)  mm/Hg


 


VBG pH   (7.32-7.43)  


 


VBG pCO2   (40-60)  mmHg


 


VBG HCO3   mmol/L


 


VBG Total CO2   (22-28)  mmol/L


 


VBG O2 Sat (Calc)   (40-65)  %


 


VBG Base Excess   (0.0-2.0)  mmol/L


 


VBG Potassium   (3.6-5.2)  mmol/L


 


Glucose   ()  mg/dL


 


Lactate   (0.7-2.1)  mmol/L


 


FiO2   %


 


Sodium  133  (132-148)  mmol/l


 


Potassium  3.3 L  (3.6-5.0)  MMOL/L


 


Chloride  99  ()  mmol/L


 


Carbon Dioxide  25  (22-30)  mmol/L


 


Anion Gap  12  (10-20)  


 


BUN  9  (9-20)  mg/dl


 


Creatinine  0.6 L  (0.8-1.5)  mg/dl


 


Est GFR (African Amer)  > 60  


 


Est GFR (Non-Af Amer)  > 60  


 


Random Glucose  121 H  ()  mg/dL


 


Calcium  8.4  (8.4-10.2)  mg/dL


 


Total Bilirubin   (0.2-1.3)  mg/dl


 


AST   (17-59)  U/L


 


ALT   (21-72)  U/L


 


Alkaline Phosphatase   ()  U/L


 


Total Protein   (6.3-8.2)  G/DL


 


Albumin   (3.5-5.0)  g/dL


 


Globulin   (2.2-3.9)  gm/dL


 


Albumin/Globulin Ratio   (1.0-2.1)  


 


Venous Blood Potassium   (3.6-5.2)  mmol/L








                         Laboratory Results - last 24 hr











  10/20/18 10/21/18 10/21/18





  08:30 05:30 05:30


 


WBC   5.3 


 


RBC   4.66 


 


Hgb   13.8 


 


Hct   41.6 


 


MCV   89.5  D 


 


MCH   29.7 


 


MCHC   33.2 


 


RDW   13.1 


 


pO2   


 


VBG pH   


 


VBG pCO2   


 


VBG HCO3   


 


VBG Total CO2   


 


VBG O2 Sat (Calc)   


 


VBG Base Excess   


 


VBG Potassium   


 


Glucose   


 


Lactate   


 


FiO2   


 


Sodium  133   138


 


Potassium  3.3 L   3.5 L


 


Chloride  99   107


 


Carbon Dioxide  25   25


 


Anion Gap  12   10


 


BUN  9   12


 


Creatinine  0.6 L   0.6 L


 


Est GFR ( Amer)  > 60   > 60


 


Est GFR (Non-Af Amer)  > 60   > 60


 


Random Glucose  121 H   116 H


 


Calcium  8.4   8.6


 


Total Bilirubin    2.0 H


 


AST    166 H D


 


ALT    150 H D


 


Alkaline Phosphatase    53


 


Total Protein    7.2


 


Albumin    3.3 L D


 


Globulin    3.9


 


Albumin/Globulin Ratio    0.8 L


 


Venous Blood Potassium   














  10/21/18





  06:38


 


WBC 


 


RBC 


 


Hgb 


 


Hct 


 


MCV 


 


MCH 


 


MCHC 


 


RDW 


 


pO2  63 H


 


VBG pH  7.46 H


 


VBG pCO2  35 L


 


VBG HCO3  25.9


 


VBG Total CO2  26.0


 


VBG O2 Sat (Calc)  95.2 H


 


VBG Base Excess  1.4


 


VBG Potassium  3.4 L


 


Glucose  119 H


 


Lactate  1.4


 


FiO2  21.0


 


Sodium  132.0


 


Potassium 


 


Chloride  103.0


 


Carbon Dioxide 


 


Anion Gap 


 


BUN 


 


Creatinine 


 


Est GFR ( Amer) 


 


Est GFR (Non-Af Amer) 


 


Random Glucose 


 


Calcium 


 


Total Bilirubin 


 


AST 


 


ALT 


 


Alkaline Phosphatase 


 


Total Protein 


 


Albumin 


 


Globulin 


 


Albumin/Globulin Ratio 


 


Venous Blood Potassium  3.4 L











Fingerstick Blood Sugar Results: 175





Critical Care Progress Note





- Nutrition


Nutrition: 


                                    Nutrition











 Category Date Time Status


 


 NPO Diet [DIET] Diets  10/20/18 Breakfast Active














Assessment/Plan





- Assessment and Plan (Free Text)


Assessment: 





A/P





Seizer, alcohol withdrawal, ETOH abuse, HTN, elevated glucose





- [facility] protocol


- Continue meds


- Pulmonaey toilets


- Follow up labs


- BP control

## 2018-10-22 LAB
ALBUMIN SERPL-MCNC: 3.5 G/DL (ref 3.5–5)
ALBUMIN/GLOB SERPL: 0.9 {RATIO} (ref 1–2.1)
ALT SERPL-CCNC: 198 U/L (ref 21–72)
AST SERPL-CCNC: 256 U/L (ref 17–59)
BUN SERPL-MCNC: 11 MG/DL (ref 9–20)
CALCIUM SERPL-MCNC: 8.8 MG/DL (ref 8.4–10.2)
GFR NON-AFRICAN AMERICAN: > 60

## 2018-10-22 RX ADMIN — THIAMINE HYDROCHLORIDE SCH MG: 100 INJECTION, SOLUTION INTRAMUSCULAR; INTRAVENOUS at 09:16

## 2018-10-22 RX ADMIN — ENOXAPARIN SODIUM SCH MG: 40 INJECTION SUBCUTANEOUS at 09:15

## 2018-10-22 RX ADMIN — DEXMEDETOMIDINE HYDROCHLORIDE SCH MLS/HR: 100 INJECTION, SOLUTION, CONCENTRATE INTRAVENOUS at 09:06

## 2018-10-22 NOTE — CP.CCUPN
CCU Subjective





- Physician Review


Events Since Last Encounter (Free Text): 





10/22/18 14:48


delirious, uncooperative.                                                       

                                                                                

                                                                                

                               





CCU Objective





- Vital Signs / Intake & Output


Vital Signs (Last 4 hours): 


Vital Signs











  Temp Pulse Resp BP Pulse Ox


 


 10/22/18 12:00  98.0 F  79  15  134/64  100











Intake and Output (Last 8hrs): 


                                 Intake & Output











 10/21/18 10/22/18 10/22/18





 22:59 06:59 14:59


 


Intake Total  1550 350


 


Output Total 500 700 750


 


Balance -500 850 -400


 


Intake:   


 


  IV  1000 0


 


  Intake, Piggyback  350 350


 


  Oral  200 


 


Output:   


 


  Urine 500 700 750


 


    Urethral (Guardado) 500 700 750














- Physical Exam


Physical Exam Limitations: Positive for: Altered Mental Status


Head: Positive for: Atraumatic, Normocephalic


Pupils: Positive for: PERRL


Conjunctiva: Positive for: Normal


Mouth: Positive for: Moist Mucous Membranes


Pharnyx: Positive for: Normal


Nose (Internal): Positive for: Normal Inspection


Neck: Positive for: Normal Range of Motion


Respiratory/Chest: Positive for: Clear to Auscultation


Cardiovascular: Positive for: Regular Rate and Rhythm


Abdomen: Positive for: Normal Bowel Sounds


Upper Extremity: Positive for: Normal Inspection


Lower Extremity: Positive for: Normal Inspection


Skin: Positive for: Normal Color


Psychiatric: Positive for: Alert





- Medications


Active Medications: 


Active Medications











Generic Name Dose Route Start Last Admin





  Trade Name Freq  PRN Reason Stop Dose Admin


 


Acetaminophen  650 mg  10/20/18 01:40  10/20/18 04:35





  Tylenol 650 Mg Supp  [place]   650 mg





  Q4 PRN   Administration





  Fever >100.4 F   





     





     





     


 


Chlordiazepoxide  50 mg  10/20/18 22:00  10/22/18 09:34





  Librium  PO   Not Given





  Q6 [facility]   





     





     





     





     


 


Enoxaparin Sodium  40 mg  10/21/18 09:00  10/22/18 09:15





  Lovenox  SC   40 mg





  DAILY [facility]   Administration





     





     





  Protocol   





     


 


Famotidine  20 mg  10/20/18 09:00  10/22/18 09:30





  Pepcid  IVP   20 mg





  DAILY [facility]   Administration





     





     





     





     


 


Hydralazine HCl  10 mg  10/20/18 04:00  10/22/18 09:30





  Apresoline  IV   10 mg





  Q6 [facility]   Administration





     





     





     





     


 


Levetiracetam 500 mg/ Sodium  105 mls @ 210 mls/hr  10/20/18 09:00  10/22/18 

09:33





  Chloride  IVPB   210 mls/hr





  Q12 [facility]   Administration





     





     





     





     


 


Lactated Ringer's  1,000 mls @ 125 mls/hr  10/20/18 02:00  10/22/18 11:54





  Lactated Ringer's  IV   125 mls/hr





  .Q8H [facility]   Administration





     





     





     





     


 


Vancomycin HCl 1 gm/ Sodium  250 mls @ 166.667 mls/hr  10/21/18 21:00  10/22/18 

09:17





  Chloride  IVPB   166.667 mls/hr





  Q12 [facility]   Administration





     





     





  Protocol   





     


 


Dexmedetomidine HCl 400 mcg/  100 mls @ 3.27 mls/hr  10/22/18 08:03  10/22/18 

12:30





  Sodium Chloride  IV   0.7 mcg/kg/hr





  .Q24H [facility]   11.46 mls/hr





     Titration





     





  Protocol   





  0.2 MCG/KG/HR   


 


Lorazepam  1 mg  10/20/18 01:39  10/21/18 14:40





  Ativan  IVP   1 mg





  Q3H PRN   Administration





  Symptoms of alcohol withdrawl   





     





     





     


 


Lorazepam  3 mg  10/21/18 17:04  10/22/18 11:15





  Ativan  IVP   3 mg





  Q3H PRN   Administration





  Seizure, severe agitation   





     





     





     


 


Thiamine HCl  100 mg  10/20/18 09:00  10/22/18 09:16





  Vitamin B1 Inj  IM  10/23/18 09:01  100 mg





  DAILY [facility]   Administration





     





     





     





     














- Patient Studies


Lab Studies: 


                              Microbiology Studies











 10/20/18 00:45 Blood Culture - Preliminary





 Blood-Venous    NO GROWTH AFTER 48 HOURS


 


 10/20/18 02:00 S.aureus & Coag-Neg Staph PNA FISH - Final





 Blood-Venous Blood Culture - Preliminary





    Gram Positive Cocci





 Gram Stain - Final








                                   Lab Studies











  10/22/18 10/22/18 Range/Units





  11:59 09:04 


 


Sodium  138   (132-148)  mmol/l


 


Potassium  3.7   (3.6-5.0)  MMOL/L


 


Chloride  106   ()  mmol/L


 


Carbon Dioxide  23   (22-30)  mmol/L


 


Anion Gap  13   (10-20)  


 


BUN  11   (9-20)  mg/dl


 


Creatinine  0.6 L   (0.8-1.5)  mg/dl


 


Est GFR (African Amer)  > 60   


 


Est GFR (Non-Af Amer)  > 60   


 


Random Glucose  81   ()  mg/dL


 


Calcium  8.8   (8.4-10.2)  mg/dL


 


Total Bilirubin  2.2 H   (0.2-1.3)  mg/dl


 


AST  256 H D   (17-59)  U/L


 


ALT  198 H D   (21-72)  U/L


 


Alkaline Phosphatase  60   ()  U/L


 


Total Protein  7.3   (6.3-8.2)  G/DL


 


Albumin  3.5   (3.5-5.0)  g/dL


 


Globulin  3.9   (2.2-3.9)  gm/dL


 


Albumin/Globulin Ratio  0.9 L   (1.0-2.1)  


 


Random Vancomycin   < 5.0  ug/mL








                         Laboratory Results - last 24 hr











  10/22/18 10/22/18





  09:04 11:59


 


Sodium   138


 


Potassium   3.7


 


Chloride   106


 


Carbon Dioxide   23


 


Anion Gap   13


 


BUN   11


 


Creatinine   0.6 L


 


Est GFR ( Amer)   > 60


 


Est GFR (Non-Af Amer)   > 60


 


Random Glucose   81


 


Calcium   8.8


 


Total Bilirubin   2.2 H


 


AST   256 H D


 


ALT   198 H D


 


Alkaline Phosphatase   60


 


Total Protein   7.3


 


Albumin   3.5


 


Globulin   3.9


 


Albumin/Globulin Ratio   0.9 L


 


Random Vancomycin  < 5.0 











Fingerstick Blood Sugar Results: 175





Review of Systems





- Review of Systems


Systems not reviewed;Unavailable: Altered Mental Status





- EENT


Eyes: UNREMARKABLE


Nose/Mouth/Throat: As Per HPI





- Cardiovascular


Cardiovascular: UNREMARKABLE





- Respiratory


Respiratory: UNREMARKABLE





- Gastrointestinal


Gastrointestinal: UNREMARKABLE





- Genitourinary


Genitourinary: UNREMARKABLE





- Integumentary


Integumentary: As Per HPI (       ), UNREMARKABLE





- Neurological


Neurological: Behavioral Changes





Critical Care Progress Note





- Nutrition


Nutrition: 


                                    Nutrition











 Category Date Time Status


 


 Heart Healthy Diet [DIET] Diets  10/21/18 Dinner Active














Assessment/Plan


(1) Alcohol withdrawal seizure


Assessment and plan: 


59yo M. PMHx ETOH abuse, seizures.  p/w ETOH withdrawal seizure.





Neuro: Metabolic encephalopathy. See what protocol for alcohol withdrawal, 

Librium PO. Keppra IV every 12 for seizure disorder.





Pulm: No acute issues breathing spontaneously on room air





CV: Hemodynamically stable.  Hydralazine IV every 6 for hypertension.





Hem: Thrombocytopenia





Renal: No acute issues. Thiamine IM daily. LR@125





Endo: No acute issues





GI: Nothing by mouth





ID: No acute issues





DVT proph - Lovenox


GI proph - not currently indicated


guardado for strict I/O's during acute illness


Code status - full code





Critical Care Time spent 35 minutes


Multi-disciplinary rounds were performed with house staff, nursing, speech 

therapy, respiratory therapy, pharmacy and nutrition with integrated input from 

the primary team/attending and other consulting services.  The documented time 

is cumulative and includes review of patient data/exams/labs/chart review and 

examination of the patient on rounds and throughout the day; time is exclusive 

of any procedures or teaching time.


Current Visit: Yes   Status: Acute

## 2018-10-22 NOTE — CP.[email or web address].CON
History of Present Illness





- History of Present Illness


History of Present Illness: 


Neurolgy consult dictated. 





Thank you dr hardy 





Past Patient History





- Past Medical History & Family History


Past Medical History?: Yes





- Past Social History


Smoking Status: Unknown If Ever Smoked





- CARDIAC


Hx Cardiac Disorders: No





- PULMONARY


Hx Respiratory Disorders: No





- NEUROLOGICAL


Hx Neurological Disorder: Yes


Hx Seizures: Yes





- HEENT


Hx HEENT Problems: No





- RENAL


Hx Chronic Kidney Disease: No





- ENDOCRINE/METABOLIC


Hx Endocrine Disorders: No





- HEMATOLOGICAL/ONCOLOGICAL


Hx Blood Disorders: No





- INTEGUMENTARY


Hx Dermatological Problems: No





- MUSCULOSKELETAL/RHEUMATOLOGICAL


Hx Musculoskeletal Disorders: No


Hx Falls: Yes





- GASTROINTESTINAL


Hx Gastrointestinal Disorders: No





- GENITOURINARY/GYNECOLOGICAL


Hx Genitourinary Disorders: No





- PSYCHIATRIC


Hx Psychophysiologic Disorder: No


Hx Substance Use: No





- SURGICAL HISTORY


Hx Surgeries: No





- ANESTHESIA


Hx Anesthesia: No


Hx Anesthesia Reactions: No


Hx Malignant Hyperthermia: No


Has any member of the family had a problem w/ anesthesia?: No





Meds


Allergies/Adverse Reactions: 


                                    Allergies











Allergy/AdvReac Type Severity Reaction Status Date / Time


 


Unobtainable Allergy   Verified 10/19/18 22:41














- Medications


Medications: 


                               Current Medications





Acetaminophen (Tylenol 650 Mg Supp)  650 mg [place] Q4 PRN


   PRN Reason: Fever >100.4 F


   Last Admin: 10/20/18 04:35 Dose:  650 mg


Chlordiazepoxide (Librium)  50 mg PO Q6 [facility]


   Last Admin: 10/22/18 09:34 Dose:  Not Given


Enoxaparin Sodium (Lovenox)  40 mg SC DAILY [facility]; Protocol


   Last Admin: 10/22/18 09:15 Dose:  40 mg


Famotidine (Pepcid)  20 mg IVP DAILY [facility]


   Last Admin: 10/22/18 09:30 Dose:  20 mg


Hydralazine HCl (Apresoline)  10 mg IV Q6 [facility]


   Last Admin: 10/22/18 09:30 Dose:  10 mg


Levetiracetam 500 mg/ Sodium (Chloride)  105 mls @ 210 mls/hr IVPB Q12 [facility]


   Last Admin: 10/22/18 09:33 Dose:  210 mls/hr


Lactated Ringer's (Lactated Ringer's)  1,000 mls @ 125 mls/hr IV .Q8H [facility]


   Last Admin: 10/22/18 11:54 Dose:  125 mls/hr


Vancomycin HCl 1 gm/ Sodium (Chloride)  250 mls @ 166.667 mls/hr IVPB Q12 [facility]; 

Protocol


   Last Admin: 10/22/18 09:17 Dose:  166.667 mls/hr


Dexmedetomidine HCl 400 mcg/ (Sodium Chloride)  100 mls @ 3.27 mls/hr IV .Q24H 

[facility]; Protocol


   Last Titration: 10/22/18 11:51 Dose:  0.6 mcg/kg/hr, 9.82 mls/hr


Lorazepam (Ativan)  1 mg IVP Q3H PRN


   PRN Reason: Symptoms of alcohol withdrawl


   Last Admin: 10/21/18 14:40 Dose:  1 mg


Lorazepam (Ativan)  3 mg IVP Q3H PRN


   PRN Reason: Seizure, severe agitation


   Last Admin: 10/22/18 11:15 Dose:  3 mg


Thiamine HCl (Vitamin B1 Inj)  100 mg IM DAILY [facility]


   Stop: 10/23/18 09:01


   Last Admin: 10/22/18 09:16 Dose:  100 mg











Results





- Vital Signs


Recent Vital Signs: 


                                Last Vital Signs











Temp  97.8 F   10/22/18 07:56


 


Pulse  76   10/22/18 10:00


 


Resp  23   10/22/18 10:00


 


BP  122/67   10/22/18 10:00


 


Pulse Ox  97   10/22/18 10:00














- Labs


Result Diagrams: 


                                 10/21/18 05:30





                                 10/22/18 11:59


Labs: 


                         Laboratory Results - last 24 hr











  10/22/18 10/22/18





  09:04 11:59


 


Sodium   138


 


Potassium   3.7


 


Chloride   106


 


Carbon Dioxide   23


 


Anion Gap   13


 


BUN   11


 


Creatinine   0.6 L


 


Est GFR ( Amer)   > 60


 


Est GFR (Non-Af Amer)   > 60


 


Random Glucose   81


 


Calcium   8.8


 


Total Bilirubin   2.2 H


 


AST   256 H D


 


ALT   198 H D


 


Alkaline Phosphatase   60


 


Total Protein   7.3


 


Albumin   3.5


 


Globulin   3.9


 


Albumin/Globulin Ratio   0.9 L


 


Random Vancomycin  < 5.0

## 2018-10-22 NOTE — CP.PCM.PN
<Ariella Lund - Last Filed: 10/22/18 12:07>





Subjective





- Date & Time of Evaluation


Date of Evaluation: 10/22/18


Time of Evaluation: 09:00





- Subjective


Subjective: 


Pt agitated and confused overnight, today he is on a 1:1 awake responding to 

verbal stimuli, still confused. Soft wrist restraints for safety. No fevers, 

nausea, vomiting, seizures, tremors. 








Objective





- Vital Signs/Intake and Output


Vital Signs (last 24 hours): 


                                        











Temp Pulse Resp BP Pulse Ox


 


 97.8 F   83   16   160/123 H  100 


 


 10/22/18 07:56  10/22/18 09:30  10/22/18 07:56  10/22/18 09:30  10/22/18 07:56








Intake and Output: 


                                        











 10/22/18 10/22/18





 06:59 18:59


 


Intake Total 1550 0


 


Output Total 700 


 


Balance 850 0














- Medications


Medications: 


                               Current Medications





Acetaminophen (Tylenol 650 Mg Supp)  650 mg NV Q4 PRN


   PRN Reason: Fever >100.4 F


   Last Admin: 10/20/18 04:35 Dose:  650 mg


Chlordiazepoxide (Librium)  50 mg PO Q6 GOLDEN


   Last Admin: 10/22/18 09:34 Dose:  Not Given


Enoxaparin Sodium (Lovenox)  40 mg SC DAILY G[facility]; Protocol


   Last Admin: 10/22/18 09:15 Dose:  40 mg


Famotidine (Pepcid)  20 mg IVP DAILY G[facility]


   Last Admin: 10/22/18 09:30 Dose:  20 mg


Hydralazine HCl (Apresoline)  10 mg IV Q6 GOLDEN


   Last Admin: 10/22/18 09:30 Dose:  10 mg


Levetiracetam 500 mg/ Sodium (Chloride)  105 mls @ 210 mls/hr IVPB Q12 GOLD[facility]


   Last Admin: 10/22/18 09:33 Dose:  210 mls/hr


Lactated Ringer's (Lactated Ringer's)  1,000 mls @ 125 mls/hr IV .Q8H GOLDEN


   Last Admin: 10/22/18 02:23 Dose:  125 mls/hr


Vancomycin HCl 1 gm/ Sodium (Chloride)  250 mls @ 166.667 mls/hr IVPB Q12 GOLDEN; 

Protocol


   Last Admin: 10/22/18 09:17 Dose:  166.667 mls/hr


Dexmedetomidine HCl 400 mcg/ (Sodium Chloride)  100 mls @ 3.27 mls/hr IV .Q24H 

GOLDEN; Protocol


   Last Titration: 10/22/18 10:06 Dose:  0.4 mcg/kg/hr, 6.55 mls/hr


Lorazepam (Ativan)  1 mg IVP Q3H PRN


   PRN Reason: Symptoms of alcohol withdrawl


   Last Admin: 10/21/18 14:40 Dose:  1 mg


Lorazepam (Ativan)  3 mg IVP Q3H PRN


   PRN Reason: Seizure, severe agitation


   Last Admin: 10/22/18 07:54 Dose:  3 mg


Thiamine HCl (Vitamin B1 Inj)  100 mg IM DAILY GOLD[facility]


   Stop: 10/23/18 09:01


   Last Admin: 10/22/18 09:16 Dose:  100 mg











- Labs


Labs: 


                                        





                                 10/21/18 05:30 





                                 10/21/18 05:30 





                                        











PT  12.6 Seconds (9.8-13.1)   10/19/18  23:05    


 


INR  1.1   10/19/18  23:05    


 


APTT  36.2 Seconds (25.6-37.1)   10/19/18  23:05    














- Constitutional


Appears: Non-toxic, No Acute Distress, Confused





- Head Exam


Head Exam: ATRAUMATIC, NORMAL INSPECTION, NORMOCEPHALIC





- Eye Exam


Eye Exam: EOMI, Normal appearance





- ENT Exam


ENT Exam: Mucous Membranes Moist





- Respiratory Exam


Respiratory Exam: Clear to Ausculation Bilateral, NORMAL BREATHING PATTERN





- Cardiovascular Exam


Cardiovascular Exam: RRR, +S1, +S2





- GI/Abdominal Exam


GI & Abdominal Exam: Soft, Normal Bowel Sounds





-  Exam


Additional comments: 


300ml in guardado-anjum urine








- Extremities Exam


Extremities Exam: Normal Inspection


Additional comments: 


Soft wrist restraints








- Neurological Exam


Neurological Exam: Alert, Altered, Awake





Assessment and Plan





- Assessment and Plan (Free Text)


Assessment: 


57 yo M with hx of non compliance with meds, Alcohol abuse and seizure, brought 

to the ED with  recurrent witnessed seizures,The last in the ED , where the Bp 

was 183/111mmHG with a temp of 103F rectal. He received a total of 12mg of 

Ativan for the Seizures. 





Pt with agitation no status epilepticus, placed on Precedex, being titrated up. 





1. Status Epilepticus due to non compliance of medication and most probably to 

Alcohol withdrawal. Keppra 750mg given in ED


-Consult Neurology Dr Mishra-pending 


-Keppra 500mg Q12H


-Ativan for Seizure


-Ativan for Agitation


-Precedex IV titration 0.5mcg/kg/hr


-Seizure precaution with bed rails up, wrist restraints and padded 


-F/u CBC, CMP 





2. Alcohol Withdrawal


-[facility] protocol


-Ativan for agitation and Tremors


-Start Librium when patient beginx to eat


-Banana bag which includes Thiamine, Folic Acid acid and Multivitamin daily 

while patient is NPO


-change to Oral Folic Acid and Thiamine when patient start to eat





3. Elevated blood pressures secondary to Alcohol withdrawal


-Hydralazine IV for SBP>150mmHG


-Start Clonidine when patient begin to use the oral route


-Follow Blood Pressures





4. Lactic Acidosis from the seizure


-Lactate trending down now 1.4 from 5.4


-IV fluids- LR's @125


-Follow VBG with lactate


-Follow K, 3.5, CMP stat





5. Elevated blood glucose


- HbA1c- 5.4, glucose 116





6. Bacteremia, fevers on admission


-Afebrile


-Blood cx- Gram + Cocci


-Ordered Echo


-Vancomycin 1gm Q12, Vanc level <5 today


-F/u Vanc trough and Echo in AM


-Urine Culture- No growth


-Tylenol supp PRN





7. Stress ulcer prophylaxis 


-Pepcid 20mg





8. DVT prophylaxis with SCD


-Lovenox 40mg





9.Code Status


Full








<Anna Otero - Last Filed: 10/22/18 18:40>





Objective





- Vital Signs/Intake and Output


Vital Signs (last 24 hours): 


                                        











Temp Pulse Resp BP Pulse Ox


 


 97.7 F   55 L  14   125/69   100 


 


 10/22/18 16:00  10/22/18 16:36  10/22/18 16:00  10/22/18 16:36  10/22/18 16:00








Intake and Output: 


                                        











 10/22/18 10/22/18





 06:59 18:59


 


Intake Total 1550 350


 


Output Total 700 750


 


Balance 850 -400














- Medications


Medications: 


                               Current Medications





Acetaminophen (Tylenol 650 Mg Supp)  650 mg NV Q4 PRN


   PRN Reason: Fever >100.4 F


   Last Admin: 10/20/18 04:35 Dose:  650 mg


Chlordiazepoxide (Librium)  50 mg PO Q6 GOLDEN


   Last Admin: 10/22/18 16:37 Dose:  Not Given


Enoxaparin Sodium (Lovenox)  40 mg SC DAILY GOLDEN; Protocol


   Last Admin: 10/22/18 09:15 Dose:  40 mg


Hydralazine HCl (Apresoline)  10 mg IV Q6 Formerly [facility], 1928[facility]1983


   Last Admin: 10/22/18 16:36 Dose:  Not Given


Levetiracetam 500 mg/ Sodium (Chloride)  105 mls @ 210 mls/hr IVPB Q12 GOLDEN


   Last Admin: 10/22/18 09:33 Dose:  210 mls/hr


Lactated Ringer's (Lactated Ringer's)  1,000 mls @ 125 mls/hr IV .Q8H GOLDEN


   Last Admin: 10/22/18 11:54 Dose:  125 mls/hr


Dexmedetomidine HCl 400 mcg/ (Sodium Chloride)  100 mls @ 3.27 mls/hr IV .Q24H 

GOLDEN; Protocol


   Last Titration: 10/22/18 16:18 Dose:  0.6 mcg/kg/hr, 9.82 mls/hr


Lorazepam (Ativan)  1 mg IVP Q3H PRN


   PRN Reason: Symptoms of alcohol withdrawl


   Last Admin: 10/21/18 14:40 Dose:  1 mg


Lorazepam (Ativan)  3 mg IVP Q3H PRN


   PRN Reason: Seizure, severe agitation


   Last Admin: 10/22/18 11:15 Dose:  3 mg


Thiamine HCl (Vitamin B1 Inj)  100 mg IM DAILY [facility], [facility]


   Stop: 10/23/18 09:01


   Last Admin: 10/22/18 09:16 Dose:  100 mg











- Labs


Labs: 


                                        





                                 10/21/18 05:30 





                                 10/22/18 11:59 





                                        











PT  12.6 Seconds (9.8-13.1)   10/19/18  23:05    


 


INR  1.1   10/19/18  23:05    


 


APTT  36.2 Seconds (25.6-37.1)   10/19/18  23:05    














Attending/Attestation





- Attestation


I have personally seen and examined this patient.: Yes


I have fully participated in the care of the patient.: Yes


I have reviewed all pertinent clinical information, including history, physical 

exam and plan: Yes


Notes (Text): 








Pt is tremulous,  confused , however  oriented to person .


Blood c/s came back + one bottle only for  Staph  Coag  Negative ( possible 

contamination ) - Pt is on  IV vanco .  Will continue to monitor and rpt Blood 

c/s .

## 2018-10-23 LAB
ALBUMIN SERPL-MCNC: 3 G/DL (ref 3.5–5)
ALBUMIN/GLOB SERPL: 0.8 {RATIO} (ref 1–2.1)
ALT SERPL-CCNC: 198 U/L (ref 21–72)
AST SERPL-CCNC: 243 U/L (ref 17–59)
BASOPHILS # BLD AUTO: 0.1 K/UL (ref 0–0.2)
BASOPHILS NFR BLD: 1.2 % (ref 0–2)
BUN SERPL-MCNC: 10 MG/DL (ref 9–20)
CALCIUM SERPL-MCNC: 8.7 MG/DL (ref 8.4–10.2)
EOSINOPHIL # BLD AUTO: 0.1 K/UL (ref 0–0.7)
EOSINOPHIL NFR BLD: 2.6 % (ref 0–4)
ERYTHROCYTE [DISTWIDTH] IN BLOOD BY AUTOMATED COUNT: 13.1 % (ref 11.5–14.5)
GFR NON-AFRICAN AMERICAN: > 60
HGB BLD-MCNC: 13.7 G/DL (ref 12–18)
LYMPHOCYTES # BLD AUTO: 1.2 K/UL (ref 1–4.3)
LYMPHOCYTES NFR BLD AUTO: 25.6 % (ref 20–40)
MCH RBC QN AUTO: 29.8 PG (ref 27–31)
MCHC RBC AUTO-ENTMCNC: 33.1 G/DL (ref 33–37)
MCV RBC AUTO: 90 FL (ref 80–94)
MONOCYTES # BLD: 0.5 K/UL (ref 0–0.8)
MONOCYTES NFR BLD: 11.1 % (ref 0–10)
NEUTROPHILS # BLD: 2.8 K/UL (ref 1.8–7)
NEUTROPHILS NFR BLD AUTO: 59.5 % (ref 50–75)
NRBC BLD AUTO-RTO: 0.1 % (ref 0–0)
PLATELET # BLD: 101 K/UL (ref 130–400)
PMV BLD AUTO: 11.1 FL (ref 7.2–11.7)
RBC # BLD AUTO: 4.6 MIL/UL (ref 4.4–5.9)
WBC # BLD AUTO: 4.7 K/UL (ref 4.8–10.8)

## 2018-10-23 RX ADMIN — DEXMEDETOMIDINE HYDROCHLORIDE SCH MLS/HR: 100 INJECTION, SOLUTION, CONCENTRATE INTRAVENOUS at 04:01

## 2018-10-23 RX ADMIN — THIAMINE HYDROCHLORIDE SCH: 100 INJECTION, SOLUTION INTRAMUSCULAR; INTRAVENOUS at 16:36

## 2018-10-23 RX ADMIN — ENOXAPARIN SODIUM SCH MG: 40 INJECTION SUBCUTANEOUS at 09:37

## 2018-10-23 NOTE — CARD
--------------- APPROVED REPORT --------------





Date of service: 10/23/2018



EXAM: Two-dimensional and M-mode echocardiogram with Doppler and 

color Doppler.



Other Information 

Quality : GoodRhythm : NSR



INDICATION

Infection:



2D DIMENSIONS 

IVSd1.10   (0.7-1.1cm)LVDd5.07   (3.9-5.9cm)

LVOT Diameter2.36   (1.8-2.4cm)PWd1.24   (0.7-1.1cm)

IVSs1.55   (0.8-1.2cm)LVDs3.25   (2.5-4.0cm)

FS (%) 35.9   %PWs1.29   (0.8-1.2cm)



M-Mode DIMENSIONS 

Left Atrium (MM)4.15   (2.5-4.0cm)IVSd1.35   (0.7-1.1cm)

Aortic Root3.06   (2.2-3.7cm)LVDd4.91   (4.0-5.6cm)

Aortic Cusp Exc.2.15   (1.5-2.0cm)PWd1.47   (0.7-1.1cm)

IVSs1.91   cmFS (%) 41   %

LVDs2.88   (2.0-3.8cm)PWs1.91   cm



Aortic Valve

AoV Peak Vcfbrpyz990.2cm/sAoV VTI25.8cmAO Peak GR.8mmHg

LVOT Peak Llwtemjv91.5cm/sLVOT VTI17.86cmAO Mean GR.4mmHg

DENISE (VMAX)1.62im2OQQ (VTI)1.71gz2FN P 1/2 [id number]



Mitral Valve

E/A ratio0.0



TDI

E/Lateral E'0.0E/Medial E'0.0



 LEFT VENTRICLE 

The left ventricle is normal size.

There is normal left ventricular wall thickness.

The left ventricular systolic function is normal.

The estimated ejection fraction is 55-60%

No regional wall motion abnormalities noted..

The left ventricular diastolic function is not properly assessed. 

No left ventricle thrombus noted on this study.

There is no ventricular septal defect visualized.

There is no left ventricular aneurysm.

There is no mass noted in the left ventricle.



 RIGHT VENTRICLE 

The right ventricle is normal size.

There is normal right ventricular wall thickness.

The right ventricular systolic function is normal.



 ATRIA 

The left atrium size is borderline.

The right atrium size is normal.

The interatrial septum is intact with no evidence for an atrial 

septal defect.



 AORTIC VALVE 

The aortic valve is normal in structure.

Moderate aortic regurgitation is present.

There is no aortic valvular stenosis.

There is possible aortic valvular vegetation. Consider MARY for 

further evaluation. 



 MITRAL VALVE 

The mitral valve is normal in structure.

There is no evidence of mitral valve prolapse.

There is no mitral valve stenosis.

There is trace mitral valve regurgitation noted.



 TRICUSPID VALVE 

The tricuspid valve is normal in structure.

There is trace tricuspid valve regurgitation noted.

There is no tricuspid valve prolapse or vegetation.

There is no tricuspid valve stenosis.



 PULMONIC VALVE 

The pulmonary valve is normal in structure.

There is no pulmonic valvular regurgitation.

There is no pulmonic valvular stenosis.



 GREAT VESSELS 

The aortic root is normal in size.

The ascending aorta is normal in size.

The pulmonary artery is normal.

The IVC is normal in size and collapses >50% with inspiration.



 PERICARDIAL EFFUSION 

There is no pericardial effusion.

There is no pleural effusion.



<Conclusion>

The estimated ejection fraction is 55-60%

Moderate aortic regurgitation is present.

There is possible aortic valvular vegetation. Consider MARY for 

further evaluation.

The left ventricular diastolic function is not completely assessed.

## 2018-10-23 NOTE — CP.PCM.PN
<Ariella Lund - Last Filed: 10/23/18 11:13>





Subjective





- Date & Time of Evaluation


Date of Evaluation: 10/23/18


Time of Evaluation: 09:00





- Subjective


Subjective: 


Pt still confused slightly improved from yesterday is on a 1:1 awake responding 

to verbal stimuli. Precedex being titrated down due to HR of 40's. Soft wrist 

restraints for safety. No fevers, nausea, vomiting, seizures, tremors. 








Objective





- Vital Signs/Intake and Output


Vital Signs (last 24 hours): 


                                        











Temp Pulse Resp BP Pulse Ox


 


 97.5 F L  50 L  28 H  139/66   100 


 


 10/23/18 08:00  10/23/18 09:30  10/23/18 08:00  10/23/18 09:30  10/23/18 08:00








Intake and Output: 


                                        











 10/23/18 10/23/18





 06:59 18:59


 


Intake Total 1716 0


 


Output Total 800 


 


Balance 916 0














- Medications


Medications: 


                               Current Medications





Acetaminophen (Tylenol 650 Mg Supp)  650 mg HI Q4 PRN


   PRN Reason: Fever >100.4 F


   Last Admin: 10/20/18 04:35 Dose:  650 mg


Chlordiazepoxide (Librium)  50 mg PO Q6 GOLDEN


   Last Admin: 10/23/18 09:43 Dose:  50 mg


Enoxaparin Sodium (Lovenox)  40 mg SC DAILY GOLDEN; Protocol


   Last Admin: 10/23/18 09:37 Dose:  40 mg


Hydralazine HCl (Apresoline)  10 mg IV Q6 GOLDEN


   Last Admin: 10/23/18 09:30 Dose:  Not Given


Levetiracetam 500 mg/ Sodium (Chloride)  105 mls @ 210 mls/hr IVPB Q12 GOLDEN


   Last Admin: 10/23/18 09:35 Dose:  210 mls/hr


Lactated Ringer's (Lactated Ringer's)  1,000 mls @ 125 mls/hr IV .Q8H GOLDEN


   Last Admin: 10/23/18 03:40 Dose:  125 mls/hr


Dexmedetomidine HCl 400 mcg/ (Sodium Chloride)  100 mls @ 3.27 mls/hr IV .Q24H 

GOLDEN; Protocol


   Last Titration: 10/23/18 09:38 Dose:  0.4 mcg/kg/hr, 6.55 mls/hr


Multivitamins/Vitamin C 10 ml/Thiamine HCl 100 mg/ Folic Acid 1 mg/ 

Dextrose/Sodium Chloride  1,011.2 mls @ 75 mls/hr IV .[id number] GOLDEN


   Stop: 10/25/18 23:00


Lorazepam (Ativan)  1 mg IVP Q3H PRN


   PRN Reason: Symptoms of alcohol withdrawl


   Last Admin: 10/21/18 14:40 Dose:  1 mg


Lorazepam (Ativan)  3 mg IVP Q3H PRN


   PRN Reason: Seizure, severe agitation


   Last Admin: 10/22/18 11:15 Dose:  3 mg











- Labs


Labs: 


                                        





                                 10/23/18 04:40 





                                 10/23/18 04:40 





                                        











PT  12.6 Seconds (9.8-13.1)   10/19/18  23:05    


 


INR  1.1   10/19/18  23:05    


 


APTT  36.2 Seconds (25.6-37.1)   10/19/18  23:05    














- Constitutional


Appears: Non-toxic, No Acute Distress





- Head Exam


Head Exam: ATRAUMATIC, NORMAL INSPECTION, NORMOCEPHALIC





- Eye Exam


Eye Exam: EOMI





- ENT Exam


ENT Exam: Mucous Membranes Moist





- Respiratory Exam


Respiratory Exam: Clear to Ausculation Bilateral, NORMAL BREATHING PATTERN





- Cardiovascular Exam


Cardiovascular Exam: Bradycardia (HR 40's), +S1, +S2





- GI/Abdominal Exam


GI & Abdominal Exam: Soft, Normal Bowel Sounds





-  Exam


Additional comments: 


150ml in guardado-anjum urine








- Extremities Exam


Extremities Exam: Normal Inspection


Additional comments: 


B/L soft wrist restraints








- Neurological Exam


Neurological Exam: Alert, Altered, Awake (Not Oriented)





Assessment and Plan





- Assessment and Plan (Free Text)


Assessment: 


59 yo M with hx of non compliance with meds, Alcohol abuse and seizure, brought 

to the ED with  recurrent witnessed seizures,The last in the ED , where the Bp 

was 183/111mmHG with a temp of 103F rectal. He received a total of 12mg of 

Ativan for the Seizures. 





Pt with agitation no status epilepticus, placed on Precedex, being titrated down

after HR was in 43





1. Status Epilepticus due to non compliance of medication and most probably to 

Alcohol withdrawal. Keppra 750mg given in ED


-Consult Neurology Dr Mari


-Keppra 500mg Q12H


-Ativan for Seizure


-Ativan for Agitation


-Precedex IV titration down due to HR 40's 


-Seizure precaution with bed rails up, wrist restraints and padded 


-F/u CBC, CMP in AM





2. Alcohol Withdrawal


-[facility] protocol


-Ativan for agitation and Tremors


-Start Librium when patient beginx to eat


-Banana bag which includes Thiamine, Folic Acid acid and Multivitamin daily 

while patient is NPO


-change to Oral Folic Acid and Thiamine when patient start to eat





3. Elevated blood pressures secondary to Alcohol withdrawal


-Hydralazine IV for SBP>150mmHG


-Start Clonidine when patient begin to use the oral route


-Follow Blood Pressures





4. Lactic Acidosis from the seizure


-Lactate trending down now 1.4 from 5.4


-IV fluids- LR's @125


-K 3.7





5. Elevated blood glucose


- HbA1c- 5.4, glucose 116





6. Bacteremia, fevers on admission


-Afebrile


-Blood cx- Gram + Cocci


-F/u Ordered Echo


-Vancomycin 1gm Q12, Vanc trough <5


-Urine Culture- No growth


-Tylenol supp PRN





7. Stress ulcer prophylaxis 


-Pepcid 20mg- not given 





8. DVT prophylaxis with SCD


-Lovenox 40mg





9.Code Status


Full








<Anna Otero - Last Filed: 10/23/18 16:55>





Objective





- Vital Signs/Intake and Output


Vital Signs (last 24 hours): 


                                        











Temp Pulse Resp BP Pulse Ox


 


 98.1 F   55 L  18   122/68   100 


 


 10/23/18 16:00  10/23/18 16:33  10/23/18 16:00  10/23/18 16:33  10/23/18 16:00








Intake and Output: 


                                        











 10/23/18 10/23/18





 06:59 18:59


 


Intake Total 1716 4


 


Output Total 800 


 


Balance 916 4














- Medications


Medications: 


                               Current Medications





Acetaminophen (Tylenol 650 Mg Supp)  650 mg HI Q4 PRN


   PRN Reason: Fever >100.4 F


   Last Admin: 10/20/18 04:35 Dose:  650 mg


Chlordiazepoxide (Librium)  50 mg PO Q6 GOLDEN


   Last Admin: 10/23/18 16:35 Dose:  50 mg


Enoxaparin Sodium (Lovenox)  40 mg SC DAILY GOLDEN; Protocol


   Last Admin: 10/23/18 09:37 Dose:  40 mg


Hydralazine HCl (Apresoline)  10 mg IV Q6 GOLDEN


   Last Admin: 10/23/18 16:33 Dose:  Not Given


Levetiracetam 500 mg/ Sodium (Chloride)  105 mls @ 210 mls/hr IVPB Q12 GOLDEN


   Last Admin: 10/23/18 09:35 Dose:  210 mls/hr


Dexmedetomidine HCl 400 mcg/ (Sodium Chloride)  100 mls @ 3.27 mls/hr IV .Q24H 

GOLDEN; Protocol


   Last Titration: 10/23/18 16:34 Dose:  0.1 mcg/kg/hr, 1.64 mls/hr


Multivitamins/Vitamin C 10 ml/Thiamine HCl 100 mg/ Folic Acid 1 mg/ 

Dextrose/Sodium Chloride  1,011.2 mls @ 75 mls/hr IV .[id number] [facility]


   Stop: 10/25/18 23:00


   Last Admin: 10/23/18 12:40 Dose:  75 mls/hr


Lorazepam (Ativan)  1 mg IVP Q3H PRN


   PRN Reason: Symptoms of alcohol withdrawl


   Last Admin: 10/21/18 14:40 Dose:  1 mg


Lorazepam (Ativan)  3 mg IVP Q3H PRN


   PRN Reason: Seizure, severe agitation


   Last Admin: 10/22/18 11:15 Dose:  3 mg











- Labs


Labs: 


                                        





                                 10/23/18 04:40 





                                 10/23/18 04:40 





                                        











PT  12.6 Seconds (9.8-13.1)   10/19/18  23:05    


 


INR  1.1   10/19/18  23:05    


 


APTT  36.2 Seconds (25.6-37.1)   10/19/18  23:05    














Attending/Attestation





- Attestation


I have personally seen and examined this patient.: Yes


I have fully participated in the care of the patient.: Yes


I have reviewed all pertinent clinical information, including history, physical 

exam and plan: Yes


Notes (Text): 








Alcohol Withdrawal


Seizure due Alcohol Withrawal


Alcohol Hepatitis


Bacteremia vs Comtamination ( [facility])





More calm and  oriented today than previous days


Follows simple commands


Wrist restraint d/c


d/c Guardado Cath


Pt on Precedex


cont IV Vanco


rpt blood c/s 


ECHO done


Physical therapy

## 2018-10-23 NOTE — CP.CCUPN
CCU Subjective





- Physician Review


Events Since Last Encounter (Free Text): 





10/23/18 14:18


more alert today.





CCU Objective





- Vital Signs / Intake & Output


Vital Signs (Last 4 hours): 


Vital Signs











  Temp Pulse Resp BP Pulse Ox


 


 10/23/18 12:00  98.5 F  51 L  13  114/62  100











Intake and Output (Last 8hrs): 


                                 Intake & Output











 10/22/18 10/23/18 10/23/18





 22:59 06:59 14:59


 


Intake Total 506 1210 0


 


Output Total 450 800 


 


Balance 56 410 0


 


Weight  172 lb 


 


Intake:   


 


   1000 0


 


  Intake, Piggyback 120 110 


 


  Oral 0 100 


 


Output:   


 


  Urine 450 800 


 


    Urethral (Evangelista) 450 800 


 


Other:   


 


  # Bowel Movements  0 














- Physical Exam


Head: Positive for: Atraumatic, Normocephalic


Pupils: Positive for: PERRL


Conjunctiva: Positive for: Normal


Mouth: Positive for: Moist Mucous Membranes


Pharnyx: Positive for: Normal


Nose (Internal): Positive for: Normal Inspection


Neck: Positive for: Normal Range of Motion


Respiratory/Chest: Positive for: Clear to Auscultation


Cardiovascular: Positive for: Regular Rate and Rhythm


Abdomen: Positive for: Normal Bowel Sounds


Upper Extremity: Positive for: Normal Inspection


Lower Extremity: Positive for: Normal Inspection


Skin: Positive for: Normal Color


Psychiatric: Positive for: Alert





- Medications


Active Medications: 


Active Medications











Generic Name Dose Route Start Last Admin





  Trade Name Freq  PRN Reason Stop Dose Admin


 


Acetaminophen  650 mg  10/20/18 01:40  10/20/18 04:35





  Tylenol 650 Mg Supp  ID   650 mg





  Q4 PRN   Administration





  Fever >100.4 F   





     





     





     


 


Chlordiazepoxide  50 mg  10/20/18 22:00  10/23/18 09:43





  Librium  PO   50 mg





  Q6 [facility]   Administration





     





     





     





     


 


Enoxaparin Sodium  40 mg  10/21/18 09:00  10/23/18 09:37





  Lovenox  SC   40 mg





  DAILY [facility]   Administration





     





     





  Protocol   





     


 


Hydralazine HCl  10 mg  10/20/18 04:00  10/23/18 09:30





  Apresoline  IV   Not Given





  Q6 [facility]   





     





     





     





     


 


Levetiracetam 500 mg/ Sodium  105 mls @ 210 mls/hr  10/20/18 09:00  10/23/18 

09:35





  Chloride  IVPB   210 mls/hr





  Q12 [facility]   Administration





     





     





     





     


 


Lactated Ringer's  1,000 mls @ 125 mls/hr  10/20/18 02:00  10/23/18 03:40





  Lactated Ringer's  IV   125 mls/hr





  .Q8H [facility]   Administration





     





     





     





     


 


Dexmedetomidine HCl 400 mcg/  100 mls @ 3.27 mls/hr  10/22/18 08:03  10/23/18 

12:40





  Sodium Chloride  IV   0.2 mcg/kg/hr





  .Q24H [facility]   3.27 mls/hr





     Titration





     





  Protocol   





  0.2 MCG/KG/HR   


 


Multivitamins/Vitamin C 10 ml/  1,011.2 mls @ 75 mls/hr  10/23/18 09:45  

10/23/18 12:40





Thiamine HCl 100 mg/ Folic  IV  10/25/18 23:00  75 mls/hr





Acid 1 mg/ Dextrose/Sodium  .[id number] [facility]   Administration





Chloride     





     





     





     


 


Lorazepam  1 mg  10/20/18 01:39  10/21/18 14:40





  Ativan  IVP   1 mg





  Q3H PRN   Administration





  Symptoms of alcohol withdrawl   





     





     





     


 


Lorazepam  3 mg  10/21/18 17:04  10/22/18 11:15





  Ativan  IVP   3 mg





  Q3H PRN   Administration





  Seizure, severe agitation   





     





     





     














- Patient Studies


Lab Studies: 


                              Microbiology Studies











 10/20/18 02:00 S.aureus & Coag-Neg Staph PNA FISH - Final





 Blood-Venous Blood Culture - Final





    Staphylococcus Aureus





 Gram Stain - Final


 


 10/20/18 00:45 Blood Culture - Preliminary





 Blood-Venous    NO GROWTH AFTER 3 DAYS


 


 10/20/18 07:15 MRSA Culture (Admit) - Final





 Naris    MRSA NOT DETECTED


 


 10/21/18 19:14 Blood Culture - Preliminary





 Blood-Venous    NO GROWTH AFTER 24 HOURS


 


 10/21/18 19:06 Blood Culture - Preliminary





 Blood-Venous    NO GROWTH AFTER 24 HOURS








                                   Lab Studies











  10/23/18 10/23/18 10/23/18 Range/Units





  07:42 04:40 04:40 


 


WBC    4.7 L  (4.8-10.8)  K/uL


 


RBC    4.60  (4.40-5.90)  Mil/uL


 


Hgb    13.7  (12.0-18.0)  g/dL


 


Hct    41.4  (35.0-51.0)  %


 


MCV    90.0  (80.0-94.0)  fl


 


MCH    29.8  (27.0-31.0)  pg


 


MCHC    33.1  (33.0-37.0)  g/dL


 


RDW    13.1  (11.5-14.5)  %


 


Plt Count    101 L  (130-400)  K/uL


 


MPV    11.1  (7.2-11.7)  fl


 


Neut % (Auto)    59.5  (50.0-75.0)  %


 


Lymph % (Auto)    25.6  (20.0-40.0)  %


 


Mono % (Auto)    11.1 H  (0.0-10.0)  %


 


Eos % (Auto)    2.6  (0.0-4.0)  %


 


Baso % (Auto)    1.2  (0.0-2.0)  %


 


Neut # (Auto)    2.8  (1.8-7.0)  K/uL


 


Lymph # (Auto)    1.2  (1.0-4.3)  K/uL


 


Mono # (Auto)    0.5  (0.0-0.8)  K/uL


 


Eos # (Auto)    0.1  (0.0-0.7)  K/uL


 


Baso # (Auto)    0.1  (0.0-0.2)  K/uL


 


Sodium   140   (132-148)  mmol/l


 


Potassium   3.7   (3.6-5.0)  MMOL/L


 


Chloride   109 H   ()  mmol/L


 


Carbon Dioxide   26   (22-30)  mmol/L


 


Anion Gap   9 L   (10-20)  


 


BUN   10   (9-20)  mg/dl


 


Creatinine   0.5 L   (0.8-1.5)  mg/dl


 


Est GFR (African Amer)   > 60   


 


Est GFR (Non-Af Amer)   > 60   


 


Random Glucose   116 H   ()  mg/dL


 


Calcium   8.7   (8.4-10.2)  mg/dL


 


Total Bilirubin   2.2 H   (0.2-1.3)  mg/dl


 


AST   243 H   (17-59)  U/L


 


ALT   198 H   (21-72)  U/L


 


Alkaline Phosphatase   43   ()  U/L


 


Total Protein   6.8   (6.3-8.2)  G/DL


 


Albumin   3.0 L   (3.5-5.0)  g/dL


 


Globulin   3.8   (2.2-3.9)  gm/dL


 


Albumin/Globulin Ratio   0.8 L   (1.0-2.1)  


 


Vancomycin Trough  < 5.0 L    (5.0-10.0)  ug/mL








                         Laboratory Results - last 24 hr











  10/23/18 10/23/18 10/23/18





  04:40 04:40 07:42


 


WBC  4.7 L  


 


RBC  4.60  


 


Hgb  13.7  


 


Hct  41.4  


 


MCV  90.0  


 


MCH  29.8  


 


MCHC  33.1  


 


RDW  13.1  


 


Plt Count  101 L  


 


MPV  11.1  


 


Neut % (Auto)  59.5  


 


Lymph % (Auto)  25.6  


 


Mono % (Auto)  11.1 H  


 


Eos % (Auto)  2.6  


 


Baso % (Auto)  1.2  


 


Neut # (Auto)  2.8  


 


Lymph # (Auto)  1.2  


 


Mono # (Auto)  0.5  


 


Eos # (Auto)  0.1  


 


Baso # (Auto)  0.1  


 


Sodium   140 


 


Potassium   3.7 


 


Chloride   109 H 


 


Carbon Dioxide   26 


 


Anion Gap   9 L 


 


BUN   10 


 


Creatinine   0.5 L 


 


Est GFR ( Amer)   > 60 


 


Est GFR (Non-Af Amer)   > 60 


 


Random Glucose   116 H 


 


Calcium   8.7 


 


Total Bilirubin   2.2 H 


 


AST   243 H 


 


ALT   198 H 


 


Alkaline Phosphatase   43 


 


Total Protein   6.8 


 


Albumin   3.0 L 


 


Globulin   3.8 


 


Albumin/Globulin Ratio   0.8 L 


 


Vancomycin Trough    < 5.0 L











Fingerstick Blood Sugar Results: 175





Review of Systems





- Review of Systems


All systems: reviewed and no additional remarkable complaints except (no 

complaints)





Critical Care Progress Note





- Nutrition


Nutrition: 


                                    Nutrition











 Category Date Time Status


 


 Heart Healthy Diet [DIET] Diets  10/21/18 Dinner Active














Assessment/Plan


(1) Alcohol withdrawal seizure


Assessment and plan: 


59yo M. PMHx ETOH abuse, seizures.  p/w ETOH withdrawal seizure.





Neuro: Metabolic encephalopathy. [facility] protocol for alcohol withdrawal, Librium 

PO. Keppra IV every 12 for seizure disorder.





Pulm: No acute issues breathing spontaneously on room air





CV: Hemodynamically stable.  Hydralazine IV every 6 for hypertension.





Hem: Thrombocytopenia





Renal: No acute issues. banana bag@75





Endo: No acute issues





GI: Nothing by mouth





ID: No acute issues





DVT proph - Lovenox


GI proph - not currently indicated


Code status - full code





Critical Care Time spent 35 minutes


Multi-disciplinary rounds were performed with house staff, nursing, speech 

therapy, respiratory therapy, pharmacy and nutrition with integrated input from 

the primary team/attending and other consulting services.  The documented time 

is cumulative and includes review of patient data/exams/labs/chart review and 

examination of the patient on rounds and throughout the day; time is exclusive 

of any procedures or teaching time.


Current Visit: Yes   Status: Acute

## 2018-10-24 LAB
ALBUMIN SERPL-MCNC: 3 G/DL (ref 3.5–5)
ALBUMIN/GLOB SERPL: 0.8 {RATIO} (ref 1–2.1)
ALT SERPL-CCNC: 183 U/L (ref 21–72)
AST SERPL-CCNC: 180 U/L (ref 17–59)
BASOPHILS # BLD AUTO: 0.1 K/UL (ref 0–0.2)
BASOPHILS NFR BLD: 1.2 % (ref 0–2)
BUN SERPL-MCNC: 9 MG/DL (ref 9–20)
CALCIUM SERPL-MCNC: 8.5 MG/DL (ref 8.4–10.2)
EOSINOPHIL # BLD AUTO: 0.1 K/UL (ref 0–0.7)
EOSINOPHIL NFR BLD: 1.5 % (ref 0–4)
ERYTHROCYTE [DISTWIDTH] IN BLOOD BY AUTOMATED COUNT: 13.1 % (ref 11.5–14.5)
GFR NON-AFRICAN AMERICAN: > 60
HEPATITIS A IGM: NEGATIVE
HEPATITIS C ANTIBODY: REACTIVE
HGB BLD-MCNC: 12 G/DL (ref 12–18)
LYMPHOCYTES # BLD AUTO: 1.3 K/UL (ref 1–4.3)
LYMPHOCYTES NFR BLD AUTO: 29.1 % (ref 20–40)
MCH RBC QN AUTO: 29.6 PG (ref 27–31)
MCHC RBC AUTO-ENTMCNC: 32.5 G/DL (ref 33–37)
MCV RBC AUTO: 91.2 FL (ref 80–94)
MONOCYTES # BLD: 0.6 K/UL (ref 0–0.8)
MONOCYTES NFR BLD: 13.3 % (ref 0–10)
NEUTROPHILS # BLD: 2.4 K/UL (ref 1.8–7)
NEUTROPHILS NFR BLD AUTO: 54.9 % (ref 50–75)
NRBC BLD AUTO-RTO: 0.1 % (ref 0–0)
PLATELET # BLD: 106 K/UL (ref 130–400)
PMV BLD AUTO: 11.4 FL (ref 7.2–11.7)
RBC # BLD AUTO: 4.06 MIL/UL (ref 4.4–5.9)
WBC # BLD AUTO: 4.4 K/UL (ref 4.8–10.8)

## 2018-10-24 RX ADMIN — POTASSIUM PHOSPHATE, MONOBASIC AND POTASSIUM PHOSPHATE, DIBASIC SCH MLS/HR: 224; 236 INJECTION, SOLUTION, CONCENTRATE INTRAVENOUS at 08:28

## 2018-10-24 RX ADMIN — MULTIPLE VITAMINS W/ MINERALS TAB SCH TAB: TAB at 08:28

## 2018-10-24 RX ADMIN — CEFAZOLIN SODIUM SCH MLS/HR: 2 SOLUTION INTRAVENOUS at 16:08

## 2018-10-24 RX ADMIN — POTASSIUM PHOSPHATE, MONOBASIC AND POTASSIUM PHOSPHATE, DIBASIC SCH MLS/HR: 224; 236 INJECTION, SOLUTION, CONCENTRATE INTRAVENOUS at 11:06

## 2018-10-24 RX ADMIN — ENOXAPARIN SODIUM SCH MG: 40 INJECTION SUBCUTANEOUS at 08:28

## 2018-10-24 NOTE — CP.PCM.CON
History of Present Illness





- History of Present Illness


History of Present Illness: 





This is a  58 years old male with hx of non compliance with medication, Alcohol 

abuse and seizure,


 last admitted on 8/30/18 and discharged on 9/5/18 with dx of Alcoholic 

withdrawal,


 Seizure and metabolic encephalopathy. 


He is now brought to the ED with witnessed seizure by friend 


and this was repeated as reported by the EMS. 


In the ED the patient had another episode of seizure. 


Also in the ED the Bp was 183/111mmHG with a temp of 103F rectal. 


He received a total of 12mg of Ativan in the ED and was post Ictal on this 

examination.








PMH: Seizure





SH: Alcohol Abuse; Former Smoker; No illegal drug use








Cardology consult called for report of possible


vegetation on AV





Echo: Sclerotic AV but no vegetation noted


         AR





I do not feel MARY is necessary





EKG: no acute changes noted


         





Past Patient History





- Past Medical History & Family History


Past Medical History?: Yes





- Past Social History


Smoking Status: Unknown If Ever Smoked





- CARDIAC


Hx Cardiac Disorders: No





- PULMONARY


Hx Respiratory Disorders: No





- NEUROLOGICAL


Hx Neurological Disorder: Yes


Hx Seizures: Yes





- HEENT


Hx HEENT Problems: No





- RENAL


Hx Chronic Kidney Disease: No





- ENDOCRINE/METABOLIC


Hx Endocrine Disorders: No





- HEMATOLOGICAL/ONCOLOGICAL


Hx Blood Disorders: No





- INTEGUMENTARY


Hx Dermatological Problems: No





- MUSCULOSKELETAL/RHEUMATOLOGICAL


Hx Musculoskeletal Disorders: No


Hx Falls: Yes





- GASTROINTESTINAL


Hx Gastrointestinal Disorders: No





- GENITOURINARY/GYNECOLOGICAL


Hx Genitourinary Disorders: No





- PSYCHIATRIC


Hx Psychophysiologic Disorder: No


Hx Substance Use: No





- SURGICAL HISTORY


Hx Surgeries: No





- ANESTHESIA


Hx Anesthesia: No


Hx Anesthesia Reactions: No


Hx Malignant Hyperthermia: No


Has any member of the family had a problem w/ anesthesia?: No





Meds


Allergies/Adverse Reactions: 


                                    Allergies











Allergy/AdvReac Type Severity Reaction Status Date / Time


 


No Known Allergies Allergy  N Verified 10/24/18 08:11














- Medications


Medications: 


                               Current Medications





Acetaminophen (Tylenol 650 Mg Supp)  650 mg OR Q4 PRN


   PRN Reason: Fever >100.4 F


   Last Admin: 10/20/18 04:35 Dose:  650 mg


Chlordiazepoxide (Librium)  25 mg PO Q8 [facility]


Enoxaparin Sodium (Lovenox)  40 mg SC DAILY [facility]; Protocol


   Last Admin: 10/24/18 08:28 Dose:  40 mg


Folic Acid (Folic Acid)  1 mg PO DAILY [facility]


   Last Admin: 10/24/18 08:28 Dose:  1 mg


Hydralazine HCl (Apresoline)  10 mg IV Q6 [facility]


   Last Admin: 10/24/18 11:05 Dose:  Not Given


Levetiracetam 500 mg/ Sodium (Chloride)  105 mls @ 210 mls/hr IVPB Q12 [facility]


   Last Admin: 10/24/18 08:29 Dose:  210 mls/hr


Dexmedetomidine HCl 400 mcg/ (Sodium Chloride)  100 mls @ 3.27 mls/hr IV .Q24H 

[facility]; Protocol


   Last Titration: 10/23/18 19:12 Dose:  0 mcg/kg/hr, 0 mls/hr


Multivitamins/Vitamin C 10 ml/Thiamine HCl 100 mg/ Folic Acid 1 mg/ 

Dextrose/Sodium Chloride  1,011.2 mls @ 75 mls/hr IV .[id number] [facility]


   Stop: 10/25/18 23:00


   Last Admin: 10/23/18 12:40 Dose:  75 mls/hr


Vancomycin HCl 1 gm/ Sodium (Chloride)  250 mls @ 166.667 mls/hr IVPB Q12 [facility]; 

Protocol


   Last Admin: 10/24/18 08:29 Dose:  166.667 mls/hr


Sodium Chloride (Sodium Chloride 0.9%)  1,000 mls @ 75 mls/hr IV .[id number] [facility]


   Stop: 10/24/18 20:54


   Last Admin: 10/24/18 04:41 Dose:  75 mls/hr


Lorazepam (Ativan)  1 mg IVP Q3H PRN


   PRN Reason: Symptoms of alcohol withdrawl


   Last Admin: 10/23/18 20:12 Dose:  1 mg


Multivitamins/Minerals (Therapeutic-M Tab)  1 tab PO DAILY [facility]


   Last Admin: 10/24/18 08:28 Dose:  1 tab


Thiamine HCl (Vitamin B1 Tab)  100 mg PO DAILY [facility]


   Last Admin: 10/24/18 08:28 Dose:  100 mg











Physical Exam





- Constitutional


Appears: Unkempt, Older Than Stated Age





- Respiratory Exam


Respiratory Exam: NORMAL BREATHING PATTERN





- Cardiovascular Exam


Cardiovascular Exam: REGULAR RHYTHM





Results





- Vital Signs


Recent Vital Signs: 


                                Last Vital Signs











Temp  98.0 F   10/24/18 08:00


 


Pulse  60   10/24/18 11:05


 


Resp  17   10/24/18 08:00


 


BP  131/70   10/24/18 11:05


 


Pulse Ox  100   10/24/18 08:00














- Labs


Result Diagrams: 


                                 10/24/18 04:30





                                 10/24/18 04:30


Labs: 


                         Laboratory Results - last 24 hr











  10/24/18 10/24/18





  04:30 04:30


 


WBC  4.4 L 


 


RBC  4.06 L 


 


Hgb  12.0 


 


Hct  37.0 


 


MCV  91.2 


 


MCH  29.6 


 


MCHC  32.5 L 


 


RDW  13.1 


 


Plt Count  106 L 


 


MPV  11.4 


 


Neut % (Auto)  54.9 


 


Lymph % (Auto)  29.1 


 


Mono % (Auto)  13.3 H 


 


Eos % (Auto)  1.5 


 


Baso % (Auto)  1.2 


 


Neut # (Auto)  2.4 


 


Lymph # (Auto)  1.3 


 


Mono # (Auto)  0.6 


 


Eos # (Auto)  0.1 


 


Baso # (Auto)  0.1 


 


Sodium   140


 


Potassium   3.1 L


 


Chloride   107


 


Carbon Dioxide   27


 


Anion Gap   9 L


 


BUN   9


 


Creatinine   0.5 L


 


Est GFR ( Amer)   > 60


 


Est GFR (Non-Af Amer)   > 60


 


Random Glucose   106


 


Calcium   8.5


 


Total Bilirubin   1.4 H


 


AST   180 H D


 


ALT   183 H


 


Alkaline Phosphatase   55


 


Total Protein   6.5


 


Albumin   3.0 L


 


Globulin   3.5


 


Albumin/Globulin Ratio   0.8 L














Assessment & Plan





- Date & Time


Date: 10/24/18


Time: 10:00

## 2018-10-24 NOTE — CP.PCM.CON
History of Present Illness





- History of Present Illness


History of Present Illness: 


59 yo male with hx of seixures and ETOH abuse  admitted for same -  On admission

spiked to 103


Blood c/s + MSSA    ? vegetation on echo   








PMH: Seizure





SH: Alcohol Abuse; Former Smoker; No illegal drug use








Cardology consult called for report of possible


vegetation on AV





Echo: Sclerotic AV but no vegetation noted


         AR





Past Patient History





- Past Medical History & Family History


Past Medical History?: Yes





- Past Social History


Smoking Status: Unknown If Ever Smoked





- CARDIAC


Hx Cardiac Disorders: No





- PULMONARY


Hx Respiratory Disorders: No





- NEUROLOGICAL


Hx Neurological Disorder: Yes


Hx Seizures: Yes





- HEENT


Hx HEENT Problems: No





- RENAL


Hx Chronic Kidney Disease: No





- ENDOCRINE/METABOLIC


Hx Endocrine Disorders: No





- HEMATOLOGICAL/ONCOLOGICAL


Hx Blood Disorders: No





- INTEGUMENTARY


Hx Dermatological Problems: No





- MUSCULOSKELETAL/RHEUMATOLOGICAL


Hx Musculoskeletal Disorders: No


Hx Falls: Yes





- GASTROINTESTINAL


Hx Gastrointestinal Disorders: No





- GENITOURINARY/GYNECOLOGICAL


Hx Genitourinary Disorders: No





- PSYCHIATRIC


Hx Psychophysiologic Disorder: No


Hx Substance Use: No





- SURGICAL HISTORY


Hx Surgeries: No





- ANESTHESIA


Hx Anesthesia: No


Hx Anesthesia Reactions: No


Hx Malignant Hyperthermia: No


Has any member of the family had a problem w/ anesthesia?: No





Meds


Allergies/Adverse Reactions: 


                                    Allergies











Allergy/AdvReac Type Severity Reaction Status Date / Time


 


No Known Allergies Allergy  N Verified 10/24/18 08:11














- Medications


Medications: 


                               Current Medications





Acetaminophen (Tylenol 650 Mg Supp)  650 mg SC Q4 PRN


   PRN Reason: Fever >100.4 F


   Last Admin: 10/20/18 04:35 Dose:  650 mg


Chlordiazepoxide (Librium)  25 mg PO Q8 [facility]


Enoxaparin Sodium (Lovenox)  40 mg SC DAILY [facility]; Protocol


   Last Admin: 10/24/18 08:28 Dose:  40 mg


Folic Acid (Folic Acid)  1 mg PO DAILY [facility]


   Last Admin: 10/24/18 08:28 Dose:  1 mg


Hydralazine HCl (Apresoline)  10 mg IV Q6 [facility]


   Last Admin: 10/24/18 11:05 Dose:  Not Given


Levetiracetam 500 mg/ Sodium (Chloride)  105 mls @ 210 mls/hr IVPB Q12 [facility]


   Last Admin: 10/24/18 08:29 Dose:  210 mls/hr


Multivitamins/Vitamin C 10 ml/Thiamine HCl 100 mg/ Folic Acid 1 mg/ 

Dextrose/Sodium Chloride  1,011.2 mls @ 75 mls/hr IV .[id number] [facility]


   Stop: 10/25/18 23:00


   Last Admin: 10/23/18 12:40 Dose:  75 mls/hr


Vancomycin HCl 1 gm/ Sodium (Chloride)  250 mls @ 166.667 mls/hr IVPB Q12 [facility]; 

Protocol


   Last Admin: 10/24/18 08:29 Dose:  166.667 mls/hr


Sodium Chloride (Sodium Chloride 0.9%)  1,000 mls @ 75 mls/hr IV .[id number] [facility]


   Stop: 10/24/18 20:54


   Last Admin: 10/24/18 04:41 Dose:  75 mls/hr


Lorazepam (Ativan)  1 mg IVP Q3H PRN


   PRN Reason: Symptoms of alcohol withdrawl


   Last Admin: 10/24/18 13:01 Dose:  1 mg


Multivitamins/Minerals (Therapeutic-M Tab)  1 tab PO DAILY [facility]


   Last Admin: 10/24/18 08:28 Dose:  1 tab


Thiamine HCl (Vitamin B1 Tab)  100 mg PO DAILY [facility]


   Last Admin: 10/24/18 08:28 Dose:  100 mg











Results





- Vital Signs


Recent Vital Signs: 


                                Last Vital Signs











Temp  98.8 F   10/24/18 12:00


 


Pulse  56 L  10/24/18 12:00


 


Resp  20   10/24/18 12:00


 


BP  140/97 H  10/24/18 12:00


 


Pulse Ox  97   10/24/18 12:00














- Labs


Result Diagrams: 


                                 10/24/18 04:30





                                 10/24/18 04:30


Labs: 


                         Laboratory Results - last 24 hr











  10/24/18 10/24/18 10/24/18





  04:30 04:30 09:09


 


WBC  4.4 L  


 


RBC  4.06 L  


 


Hgb  12.0  


 


Hct  37.0  


 


MCV  91.2  


 


MCH  29.6  


 


MCHC  32.5 L  


 


RDW  13.1  


 


Plt Count  106 L  


 


MPV  11.4  


 


Neut % (Auto)  54.9  


 


Lymph % (Auto)  29.1  


 


Mono % (Auto)  13.3 H  


 


Eos % (Auto)  1.5  


 


Baso % (Auto)  1.2  


 


Neut # (Auto)  2.4  


 


Lymph # (Auto)  1.3  


 


Mono # (Auto)  0.6  


 


Eos # (Auto)  0.1  


 


Baso # (Auto)  0.1  


 


Sodium   140 


 


Potassium   3.1 L 


 


Chloride   107 


 


Carbon Dioxide   27 


 


Anion Gap   9 L 


 


BUN   9 


 


Creatinine   0.5 L 


 


Est GFR ( Amer)   > 60 


 


Est GFR (Non-Af Amer)   > 60 


 


Random Glucose   106 


 


Calcium   8.5 


 


Total Bilirubin   1.4 H 


 


AST   180 H D 


 


ALT   183 H 


 


Alkaline Phosphatase   55 


 


Total Protein   6.5 


 


Albumin   3.0 L 


 


Globulin   3.5 


 


Albumin/Globulin Ratio   0.8 L 


 


Hepatitis A IgM Ab    Negative


 


Hep Bs Antigen    Negative

## 2018-10-24 NOTE — PQF
Delirium Tremens ruled in, confirmed , treated and resolved







PROVIDER RESPONSE TEXT:



Provider was unable to determine a response for this query.



REVIEWER QUERY TEXT:



Conflicting Documentation Clarification



A single mention of DELIRIUM TREMENS is documented for the same clinical 
presentation by the ER.

Please clarify if Delirium Tremens is ruled in or ruled out and POA status if 
ruled in.



Please clarify the diagnosis/diagnoses.

Please also document if the condition is:

-- Confirmed and current

-- Confirmed, treated and resolved

-- Ruled out

-- Other, please specify



The patient's Clinical Indicators include:

Presents with seizures.



+ agitation, confusion, tremulous however oriented to person.



Rx: 1:1, Precedex , Librium, Ativan





Query created by: Keyona Bojorquez on 10/24/2018 10:25 AM





Electronically signed by:  Ariella Lund 10/24/2018 11:51 AM









CLARICE

## 2018-10-24 NOTE — CP.PCM.PN
<AshelyAriella - Last Filed: 10/24/18 12:59>





Subjective





- Date & Time of Evaluation


Date of Evaluation: 10/24/18


Time of Evaluation: 10:00





- Subjective


Subjective: 


Pt improved from yesterday awake alert and oriented to time and place, he is 

still on 1:1. Precedex was stopped HR now in the 60's. Soft wrist restraints 

were discontinued. No fevers, nausea, vomiting, seizures, tremors, diarrhea, or 

constipation. Eating well. 








Objective





- Vital Signs/Intake and Output


Vital Signs (last 24 hours): 


                                        











Temp Pulse Resp BP Pulse Ox


 


 98.0 F   54 L  17   143/52 L  100 


 


 10/24/18 08:00  10/24/18 08:00  10/24/18 08:00  10/24/18 08:00  10/24/18 08:00








Intake and Output: 


                                        











 10/24/18 10/24/18





 06:59 18:59


 


Intake Total 1310 


 


Output Total 1300 


 


Balance 10 














- Medications


Medications: 


                               Current Medications





Acetaminophen (Tylenol 650 Mg Supp)  650 mg [place] Q4 PRN


   PRN Reason: Fever >100.4 F


   Last Admin: 10/20/18 04:35 Dose:  650 mg


Chlordiazepoxide (Librium)  50 mg PO Q6 [facility]


   Last Admin: 10/24/18 03:13 Dose:  50 mg


Enoxaparin Sodium (Lovenox)  40 mg SC DAILY [facility]; Protocol


   Last Admin: 10/24/18 08:28 Dose:  40 mg


Folic Acid (Folic Acid)  1 mg PO DAILY [facility]


   Last Admin: 10/24/18 08:28 Dose:  1 mg


Hydralazine HCl (Apresoline)  10 mg IV Q6 [facility]


   Last Admin: 10/24/18 03:13 Dose:  Not Given


Levetiracetam 500 mg/ Sodium (Chloride)  105 mls @ 210 mls/hr IVPB Q12 [facility]


   Last Admin: 10/24/18 08:29 Dose:  210 mls/hr


Dexmedetomidine HCl 400 mcg/ (Sodium Chloride)  100 mls @ 3.27 mls/hr IV .Q24H 

[facility]; Protocol


   Last Titration: 10/23/18 19:12 Dose:  0 mcg/kg/hr, 0 mls/hr


Multivitamins/Vitamin C 10 ml/Thiamine HCl 100 mg/ Folic Acid 1 mg/ 

Dextrose/Sodium Chloride  1,011.2 mls @ 75 mls/hr IV .[id number] [facility]


   Stop: 10/25/18 23:00


   Last Admin: 10/23/18 12:40 Dose:  75 mls/hr


Vancomycin HCl 1 gm/ Sodium (Chloride)  250 mls @ 166.667 mls/hr IVPB Q12 [facility]; 

Protocol


   Last Admin: 10/24/18 08:29 Dose:  166.667 mls/hr


Sodium Chloride (Sodium Chloride 0.9%)  1,000 mls @ 75 mls/hr IV .[id number] [facility]


   Stop: 10/24/18 20:54


   Last Admin: 10/24/18 04:41 Dose:  75 mls/hr


Potassium Chloride (Potassium Chloride 20 Meq/100 Ml)  100 mls @ 50 mls/hr IVPB 

Q2 [facility]


   Stop: 10/24/18 11:59


   Last Admin: 10/24/18 08:28 Dose:  50 mls/hr


Lorazepam (Ativan)  1 mg IVP Q3H PRN


   PRN Reason: Symptoms of alcohol withdrawl


   Last Admin: 10/23/18 20:12 Dose:  1 mg


Lorazepam (Ativan)  3 mg IVP Q3H PRN


   PRN Reason: Seizure, severe agitation


   Last Admin: 10/24/18 00:59 Dose:  3 mg


Multivitamins/Minerals (Therapeutic-M Tab)  1 tab PO DAILY [facility]


   Last Admin: 10/24/18 08:28 Dose:  1 tab


Thiamine HCl (Vitamin B1 Tab)  100 mg PO DAILY [facility]


   Last Admin: 10/24/18 08:28 Dose:  100 mg











- Labs


Labs: 


                                        





                                 10/24/18 04:30 





                                 10/24/18 04:30 





                                        











PT  12.6 Seconds (9.8-13.1)   10/19/18  23:05    


 


INR  1.1   10/19/18  23:05    


 


APTT  36.2 Seconds (25.6-37.1)   10/19/18  23:05    














- Constitutional


Appears: Non-toxic, No Acute Distress





- Head Exam


Head Exam: ATRAUMATIC, NORMAL INSPECTION, NORMOCEPHALIC





- Eye Exam


Eye Exam: EOMI, Scleral icterus





- ENT Exam


ENT Exam: Mucous Membranes Moist





- Respiratory Exam


Respiratory Exam: Clear to Ausculation Bilateral, NORMAL BREATHING PATTERN





- Cardiovascular Exam


Cardiovascular Exam: RRR, +S1, +S2





- GI/Abdominal Exam


GI & Abdominal Exam: Soft, Normal Bowel Sounds (No spider angioma noted )





- Extremities Exam


Additional comments: 


No palmar erythema








- Neurological Exam


Neurological Exam: Alert, Awake (Oriented to time and place, No tremors, No 

asterixis)





Assessment and Plan





- Assessment and Plan (Free Text)


Assessment: 


59 yo M with hx of non compliance with meds, Alcohol abuse and seizure, brought 

to the ED with  recurrent witnessed seizures,The last in the ED , where the Bp 

was 183/111mmHG with a temp of 103F rectal. He received a total of 12mg of 

Ativan for the Seizures. 





Pt with agitation no status epilepticus, discontinued Precedex





1. Status Epilepticus due to non compliance of medication and most probably to 

Alcohol withdrawal.


-Resolved


-Neurology  consult appreciated Dr Mari-f/u EEG


-c/w Keppra 500mg Q12H, Ativan 1mg PRN


-Seizure precaution 


-will Transferred to Telemetry, 


-PT eval


-F/u CMP in AM





2. Alcohol Withdrawal


-acute resolved 


-CIWA score 0


-Ativan PRN


-Librium reduced to 25mg Q8h


-Folic Acid and Thiamine PO





3. Transaminitis


may be 2/2 alcoholic hepatitis


-f/u hepatitis panel, abd us, hiv 





4. Elevated blood pressures secondary to Alcohol withdrawal


-resolved


-monitor BP


-Hydralazine IV for SBP>150mmHG


-Start Clonidine when patient begin to use the oral route





5. Bacteremia 


-resolving


-may be contamination because pt has been afebrile since admission, no wbc


-blood cx staph A sensitive to vanco 


-c/w vanco


-last 2 blood cx normal


-Echo vegetation reported vs sclerotic AV


-Cardiologist consult appreciated. No MARY needed





6. DVT prophylaxis with SCD


-Lovenox 40mg





7.Code Status


-Full





<Anna Otero - Last Filed: 10/24/18 17:57>





Objective





- Vital Signs/Intake and Output


Vital Signs (last 24 hours): 


                                        











Temp Pulse Resp BP Pulse Ox


 


 98.2 F   78   21   136/75   98 


 


 10/24/18 16:00  10/24/18 16:09  10/24/18 16:00  10/24/18 16:09  10/24/18 16:00








Intake and Output: 


                                        











 10/24/18 10/24/18





 06:59 18:59


 


Intake Total 1310 550


 


Output Total 1300 1000


 


Balance 10 -450














- Medications


Medications: 


                               Current Medications





Acetaminophen (Tylenol 650 Mg Supp)  650 mg IL Q4 PRN


   PRN Reason: Fever >100.4 F


   Last Admin: 10/20/18 04:35 Dose:  650 mg


Chlordiazepoxide (Librium)  25 mg PO Q8 [facility]


   Last Admin: 10/24/18 16:11 Dose:  25 mg


Enoxaparin Sodium (Lovenox)  40 mg SC DAILY [facility]; Protocol


   Last Admin: 10/24/18 08:28 Dose:  40 mg


Folic Acid (Folic Acid)  1 mg PO DAILY [facility]


   Last Admin: 10/24/18 08:28 Dose:  1 mg


Hydralazine HCl (Apresoline)  10 mg IV Q6 [facility]


   Last Admin: 10/24/18 16:09 Dose:  Not Given


Levetiracetam 500 mg/ Sodium (Chloride)  105 mls @ 210 mls/hr IVPB Q12 [facility]


   Last Admin: 10/24/18 08:29 Dose:  210 mls/hr


Sodium Chloride (Sodium Chloride 0.9%)  1,000 mls @ 75 mls/hr IV .[id number] [facility]


   Stop: 10/24/18 20:54


   Last Admin: 10/24/18 04:41 Dose:  75 mls/hr


Cefazolin Sodium/Dextrose (Ancef Iv 2 Gm Duplex)  2 gm in 50 mls @ 50 mls/hr 

IVPB Q8 [facility]; Protocol


   Last Admin: 10/24/18 16:08 Dose:  50 mls/hr


Lorazepam (Ativan)  1 mg IVP Q3H PRN


   PRN Reason: Symptoms of alcohol withdrawl


   Last Admin: 10/24/18 13:01 Dose:  1 mg


Multivitamins/Minerals (Therapeutic-M Tab)  1 tab PO DAILY [facility]


   Last Admin: 10/24/18 08:28 Dose:  1 tab


Thiamine HCl (Vitamin B1 Tab)  100 mg PO DAILY [facility]


   Last Admin: 10/24/18 08:28 Dose:  100 mg











- Labs


Labs: 


                                        





                                 10/24/18 04:30 





                                 10/24/18 04:30 





                                        











PT  12.6 Seconds (9.8-13.1)   10/19/18  23:05    


 


INR  1.1   10/19/18  23:05    


 


APTT  36.2 Seconds (25.6-37.1)   10/19/18  23:05    














Attending/Attestation





- Attestation


I have personally seen and examined this patient.: Yes


I have fully participated in the care of the patient.: Yes


I have reviewed all pertinent clinical information, including history, physical 

exam and plan: Yes


Notes (Text): 











Bacteremia ( MSSA) in 1 bottle


- Pt was febrile on admission


- rpt Blood c/s x 2 drawn before starting IV antibiotics  negative so far


- ECHO read as possible vegetation


-Cardiology consulted for MARY - however as per Dr Jones - this is not a 

vegetation but a Sclerotic Valve


- ID Dr Dumas consulted rec to start IV Ancef

## 2018-10-24 NOTE — CON
DATE:  10/22/2018



This is a patient who had a neurology consult called on 10/22/2018 by ER

physician Dr. Benavides.



HISTORY OF PRESENT ILLNESS:  This is a 58-year-old male with a history of

noncompliance with medications _____ epilepsy who was admitted on

08/30/2018 and discharged on 09/15/2018 with a diagnosis of alcohol

withdrawal seizure.  He returned again on 10/20/2018 with extremely high

blood pressure going to 111 and an episode of seizures.  He received a

total of 12 mg of Ativan in the emergency room.  The patient was admitted

to the ICU and was started on Keppra and stabilized.  Over the last couple

of days, the patient has been agitating, confused, on one-to-one, and not

really following commands; however, he has not had any seizure.  He is

undergoing alcohol withdrawal protocol.



REVIEW OF SYSTEMS:  Not possible.



PAST MEDICAL HISTORY:  As above.



PAST SURGICAL HISTORY:  As above.



FAMILY HISTORY:  Not obtainable.



ALLERGIES:  NO KNOWN DRUG ALLERGIES.



MEDICATIONS:  He is on the following medications in-house:  Acetaminophen,

_____, Librium 50 mg p.o. every 6 hours, Lovenox, hydralazine, and Keppra

500 mg IV t.i.d. as well as Ativan p.r.n. and multivitamins.



LABORATORY DATA:  EEG has not been done thus far.



PHYSICAL EXAMINATION:

VITAL SIGNS:  Pulse 51, blood pressure 114/62, and respiratory rate 28.  He

is afebrile.

NEUROLOGIC:  On exam, he is confused, awake, not following proper commands,

not naming, no repeating.  Motor:  He moves all extremities equally. 

Sensory is not accurate.  Gait is not tested.  His extremities have

reflexes +2, upper and lower bilaterally.



IMPRESSION:  This is a 58-year-old male with alcohol withdrawal seizures. 

However, we made an electroencephalogram to assess the patient if having

occult epileptiform discharges.



PLAN:

1.  EEG.

2.  Continue Keppra b.i.d.



Thank you for this interesting consult.







__________________________________________

Grant Mari MD





DD:  10/23/2018 14:41:27

DT:  10/23/2018 15:16:17

[facility] # [id number]

## 2018-10-24 NOTE — US
Date of service: 



10/24/2018



HISTORY:

Transaminitis. ETOH



COMPARISON:

None.



TECHNIQUE:

Sonographic evaluation of the abdomen.  Somewhat limited exam of 

apparently patient unable to optimally cooperate with respiratory 

instructions holding breath 



FINDINGS:



LIVER:

Measures 19.3 cm.  Increased echogenicity of the liver parenchyma. No 

mass. No intrahepatic bile duct dilatation.



GALLBLADDER:

Unremarkable. No gallstones.



COMMON BILE DUCT:

Measures 3.4 mm. No stones. No dilatation.



PANCREAS:

Unremarkable as visualized. No gross mass.  No ductal dilatation.



RIGHT KIDNEY:

Measures 11.5 x 6.5 x 4.4cm.  Normal echogenicity. No calculus, mass, 

or hydronephrosis.



LEFT KIDNEY:

Measures 12.4 x 6.2 x 7.7cm.  Normal echogenicity. No calculus, mass, 

or hydronephrosis.



SPLEEN:

Normal in size and contour. No mass.



AORTA:

No aneurysmal dilatation. 



IVC:

Unremarkable. 



OTHER FINDINGS:

None. 



IMPRESSION:

Diffuse increased liver echogenicity compatible with fatty and/or 

other hepatic infiltrative pathologies.  No mass seen.  No dilated 

ducts.

## 2018-10-25 LAB
ALBUMIN SERPL-MCNC: 3.3 G/DL (ref 3.5–5)
ALBUMIN/GLOB SERPL: 0.8 {RATIO} (ref 1–2.1)
ALT SERPL-CCNC: 172 U/L (ref 21–72)
AST SERPL-CCNC: 179 U/L (ref 17–59)
BASOPHILS # BLD AUTO: 0.1 K/UL (ref 0–0.2)
BASOPHILS NFR BLD: 1 % (ref 0–2)
BUN SERPL-MCNC: 8 MG/DL (ref 9–20)
CALCIUM SERPL-MCNC: 8.7 MG/DL (ref 8.4–10.2)
EOSINOPHIL # BLD AUTO: 0.1 K/UL (ref 0–0.7)
EOSINOPHIL NFR BLD: 1.4 % (ref 0–4)
ERYTHROCYTE [DISTWIDTH] IN BLOOD BY AUTOMATED COUNT: 13.3 % (ref 11.5–14.5)
GFR NON-AFRICAN AMERICAN: > 60
HGB BLD-MCNC: 12.5 G/DL (ref 12–18)
LYMPHOCYTES # BLD AUTO: 1.4 K/UL (ref 1–4.3)
LYMPHOCYTES NFR BLD AUTO: 27.1 % (ref 20–40)
MCH RBC QN AUTO: 29.8 PG (ref 27–31)
MCHC RBC AUTO-ENTMCNC: 32.8 G/DL (ref 33–37)
MCV RBC AUTO: 90.9 FL (ref 80–94)
MONOCYTES # BLD: 0.8 K/UL (ref 0–0.8)
MONOCYTES NFR BLD: 14.9 % (ref 0–10)
NEUTROPHILS # BLD: 2.9 K/UL (ref 1.8–7)
NEUTROPHILS NFR BLD AUTO: 55.6 % (ref 50–75)
NRBC BLD AUTO-RTO: 0 % (ref 0–0)
PLATELET # BLD: 120 K/UL (ref 130–400)
PMV BLD AUTO: 10.9 FL (ref 7.2–11.7)
RBC # BLD AUTO: 4.19 MIL/UL (ref 4.4–5.9)
WBC # BLD AUTO: 5.3 K/UL (ref 4.8–10.8)

## 2018-10-25 PROCEDURE — 02HV33Z INSERTION OF INFUSION DEVICE INTO SUPERIOR VENA CAVA, PERCUTANEOUS APPROACH: ICD-10-PCS | Performed by: INTERNAL MEDICINE

## 2018-10-25 RX ADMIN — MULTIPLE VITAMINS W/ MINERALS TAB SCH TAB: TAB at 14:29

## 2018-10-25 RX ADMIN — CEFAZOLIN SODIUM SCH MLS/HR: 2 SOLUTION INTRAVENOUS at 09:48

## 2018-10-25 RX ADMIN — CEFAZOLIN SODIUM SCH MLS/HR: 2 SOLUTION INTRAVENOUS at 17:19

## 2018-10-25 RX ADMIN — ENOXAPARIN SODIUM SCH MG: 40 INJECTION SUBCUTANEOUS at 14:28

## 2018-10-25 RX ADMIN — CEFAZOLIN SODIUM SCH MLS/HR: 2 SOLUTION INTRAVENOUS at 00:38

## 2018-10-25 NOTE — CP.PCM.PN
Subjective





- Date & Time of Evaluation


Date of Evaluation: 10/25/18


Time of Evaluation: 08:00





- Subjective


Subjective: 





improving


afeb


repeat cultures neg


Hep C +





MSSA + blood -  ? vegetation   Cont rx  x 6 weeks 





Objective





- Vital Signs/Intake and Output


Vital Signs (last 24 hours): 


                                        











Temp Pulse Resp BP Pulse Ox


 


 97.6 F   54 L  18   134/81   100 


 


 10/25/18 15:30  10/25/18 15:30  10/25/18 15:30  10/25/18 15:30  10/25/18 15:30











- Medications


Medications: 


                               Current Medications





Acetaminophen (Tylenol 650 Mg Supp)  650 mg [place] Q4 PRN


   PRN Reason: Fever >100.4 F


   Last Admin: 10/20/18 04:35 Dose:  650 mg


Amlodipine Besylate (Norvasc)  [facility] mg PO [facility]ILY [facility]


   Last Admin: 10/25/18 14:29 Dose:  5 mg


Chlordiazepoxide (Librium)  25 mg PO Q8 [facility]


   Last Admin: 10/25/18 17:20 Dose:  25 mg


Enoxaparin Sodium (Lovenox)  40 mg SC DAILY [facility]; Protocol


   Last Admin: 10/25/18 14:28 Dose:  40 mg


Folic Acid (Folic Acid)  1 mg PO DAILY [facility]


   Last Admin: 10/25/18 14:30 Dose:  1 mg


Levetiracetam 500 mg/ Sodium (Chloride)  105 mls @ 210 mls/hr IVPB Q12 [facility]


   Last Admin: 10/25/18 09:47 Dose:  210 mls/hr


Cefazolin Sodium/Dextrose (Ancef Iv 2 Gm Duplex)  2 gm in 50 mls @ 50 mls/hr 

IVPB Q8 [facility]; Protocol


   Last Admin: 10/25/18 17:19 Dose:  50 mls/hr


Lorazepam (Ativan)  1 mg IVP Q3H PRN


   PRN Reason: Symptoms of alcohol withdrawl


   Last Admin: 10/25/18 12:37 Dose:  1 mg


Multivitamins/Minerals (Therapeutic-M Tab)  1 tab PO DAILY [facility]


   Last Admin: 10/25/18 14:29 Dose:  1 tab


Thiamine HCl (Vitamin B1 Tab)  100 mg PO DAILY [facility]


   Last Admin: 10/25/18 10:10 Dose:  100 mg











- Labs


Labs: 


                                        





                                 10/25/18 05:16 





                                 10/25/18 05:16 





                                        











PT  12.6 Seconds (9.8-13.1)   10/19/18  23:05    


 


INR  1.1   10/19/18  23:05    


 


APTT  36.2 Seconds (25.6-37.1)   10/19/18  23:05    














- Constitutional


Appears: Non-toxic, Confused, Chronically Ill





- Head Exam


Head Exam: NORMOCEPHALIC





- Eye Exam


Eye Exam: absent: Scleral icterus





- ENT Exam


ENT Exam: Mucous Membranes Dry





- Neck Exam


Neck Exam: absent: Lymphadenopathy





- Respiratory Exam


Respiratory Exam: Decreased Breath Sounds





- Cardiovascular Exam


Cardiovascular Exam: REGULAR RHYTHM





- GI/Abdominal Exam


GI & Abdominal Exam: Distended





- Rectal Exam


Rectal Exam: Deferred





Assessment and Plan





- Assessment and Plan (Free Text)


Assessment: 





improving


afeb


repeat cultures neg


Hep C +





MSSA + blood -  ? vegetation   Cont rx  x 6 weeks

## 2018-10-25 NOTE — CP.PCM.PN
Addendum entered and electronically signed by Ariella Lund MD  10/25/18 

11:00: 





Dr. Dumas recommending 6 weeks of antibiotics, placing Picc line today.





Original Note:








<Ariella Lund - Last Filed: 10/25/18 10:21>





Subjective





- Date & Time of Evaluation


Date of Evaluation: 10/25/18


Time of Evaluation: 09:00





- Subjective


Subjective: 


No acute overnight events. Pt improved from yesterday didnt need ativan 

overnight. Pt awake, alert, and oriented to person and place, when asked about 

time he said it was March in the year 1956. Pt is still on 1:1. No seizures, 

tremors, fevers, chills, nausea, vomiting, diarrhea, or constipation. Eating 

well. 





Objective





- Vital Signs/Intake and Output


Vital Signs (last 24 hours): 


                                        











Temp Pulse Resp BP Pulse Ox


 


 97.4 F L  58 L  18   152/64 H  98 


 


 10/25/18 05:21  10/25/18 05:21  10/25/18 05:21  10/25/18 05:21  10/25/18 05:21











- Medications


Medications: 


                               Current Medications





Acetaminophen (Tylenol 650 Mg Supp)  650 mg [place] Q4 PRN


   PRN Reason: Fever >100.4 F


   Last Admin: 10/20/18 04:35 Dose:  650 mg


Chlordiazepoxide (Librium)  25 mg PO Q8 [facility]


   Last Admin: 10/25/18 00:38 Dose:  25 mg


Enoxaparin Sodium (Lovenox)  40 mg SC DAILY [facility]; Protocol


   Last Admin: 10/24/18 08:28 Dose:  40 mg


Folic Acid (Folic Acid)  1 mg PO DAILY [facility]


   Last Admin: 10/24/18 08:28 Dose:  1 mg


Hydralazine HCl (Apresoline)  10 mg IV Q6 [facility]


   Last Admin: 10/25/18 04:15 Dose:  Not Given


Levetiracetam 500 mg/ Sodium (Chloride)  105 mls @ 210 mls/hr IVPB Q12 [facility]


   Last Admin: 10/24/18 20:19 Dose:  210 mls/hr


Cefazolin Sodium/Dextrose (Ancef Iv 2 Gm Duplex)  2 gm in 50 mls @ 50 mls/hr 

IVPB Q8 [facility]; Protocol


   Last Admin: 10/25/18 00:38 Dose:  50 mls/hr


Lorazepam (Ativan)  1 mg IVP Q3H PRN


   PRN Reason: Symptoms of alcohol withdrawl


   Last Admin: 10/24/18 20:09 Dose:  1 mg


Multivitamins/Minerals (Therapeutic-M Tab)  1 tab PO DAILY [facility]


   Last Admin: 10/24/18 08:28 Dose:  1 tab


Thiamine HCl (Vitamin B1 Tab)  100 mg PO DAILY [facility]


   Last Admin: 10/24/18 08:28 Dose:  100 mg











- Labs


Labs: 


                                        





                                 10/25/18 05:16 





                                 10/25/18 05:16 





                                        











PT  12.6 Seconds (9.8-13.1)   10/19/18  23:05    


 


INR  1.1   10/19/18  23:05    


 


APTT  36.2 Seconds (25.6-37.1)   10/19/18  23:05    














- Constitutional


Appears: Non-toxic, No Acute Distress





- Head Exam


Head Exam: ATRAUMATIC, NORMAL INSPECTION, NORMOCEPHALIC





- Eye Exam


Eye Exam: EOMI, Scleral icterus





- ENT Exam


ENT Exam: Mucous Membranes Moist





- Respiratory Exam


Respiratory Exam: Clear to Ausculation Bilateral, NORMAL BREATHING PATTERN





- Cardiovascular Exam


Cardiovascular Exam: RRR, +S1, +S2





- GI/Abdominal Exam


GI & Abdominal Exam: Soft, Normal Bowel Sounds





- Extremities Exam


Extremities Exam: Normal Inspection





- Neurological Exam


Neurological Exam: Alert, Awake (Oriented x2)


Neuro motor strength exam: Left Upper Extremity: 5, Right Upper Extremity: 5, 

Left Lower Extremity: 5, Right Lower Extremity: 5


Additional comments: 


5/5 sensation intact UE and LE





- Psychiatric Exam


Psychiatric exam: Normal Mood





Assessment and Plan





- Assessment and Plan (Free Text)


Assessment: 


57 yo M with hx of non compliance with meds, Alcohol abuse and seizure, brought 

to the ED with  recurrent witnessed seizures,The last in the ED, where the Bp 

was 183/111mmHG with a temp of 103F rectal. He received a total of 12mg of 

Ativan for the Seizures. 





Pt with agitation no status epilepticus, Precedex has been discontinued





1. Status Epilepticus due to non compliance of medication and most probably to 

Alcohol withdrawal.


-Resolved


-Neurology consult appreciated Dr Mari-f/u EEG


-c/w Keppra 500mg Q12H, Ativan 1mg PRN


-Seizure precaution 


-PT eval


-F/u CMP in AM





2. Alcohol Withdrawal


-acute resolved 


-CIWA score 0


-Ativan PRN


-Librium 25mg Q8h


-Folic Acid and Thiamine PO





3. Transaminitis


-MELD score 8


-Hep C ab-reactive, F/u Hep C RNA and genotype 


-,  trending down


-Abd us-Diffuse increased liver echogenicity compatible with fatty or other l

iver pathology, no masses or dilated ducts 


-F/u HIV 


-Non immune Hep B, Hep A negative





4. Bacteremia 


-resolving


-may be contamination because pt has been afebrile since admission, no wbc


-blood cx staph aureus  


-ID consult appreciated- Dr. Dumas


-Ancef 2gm @50ml/hr Q8hr Day #2


-D/c vanc recieved day 4/4


-Blood cx now day 3 Normal


-Echo vegetation reported vs sclerotic AV, Moderate AR


-Cardiologist consult appreciated. No MARY needed


-F/u CBC in AM





5.Hypokalemic


-K 3.1


-Gave K 40meq PO 


-Mg WNL 1.6


-F/u CMP in AM 





5. Elevated blood pressures secondary to Alcohol withdrawal


-resolving


-monitor BP


-Pt started on daily Norvasc 5mg QD





6. DVT prophylaxis with SCD


-Lovenox 40mg





7.Code Status


-Full








<Corbin Amor - Last Filed: 10/25/18 17:23>





Objective





- Vital Signs/Intake and Output


Vital Signs (last 24 hours): 


                                        











Temp Pulse Resp BP Pulse Ox


 


 97.6 F   54 L  18   134/81   100 


 


 10/25/18 15:30  10/25/18 15:30  10/25/18 15:30  10/25/18 15:30  10/25/18 15:30











- Medications


Medications: 


                               Current Medications





Acetaminophen (Tylenol 650 Mg Supp)  650 mg MA Q4 PRN


   PRN Reason: Fever >100.4 F


   Last Admin: 10/20/18 04:35 Dose:  650 mg


Amlodipine Besylate (Norvasc)  5 mg PO DAILY [facility]


   Last Admin: 10/25/18 14:29 Dose:  5 mg


Chlordiazepoxide (Librium)  25 mg PO Q8 [facility]


   Last Admin: 10/25/18 17:20 Dose:  25 mg


Enoxaparin Sodium (Lovenox)  40 mg SC DAILY [facility]; Protocol


   Last Admin: 10/25/18 14:28 Dose:  40 mg


Folic Acid (Folic Acid)  1 mg PO DAILY [facility]


   Last Admin: 10/25/18 14:30 Dose:  1 mg


Levetiracetam 500 mg/ Sodium (Chloride)  105 mls @ 210 mls/hr IVPB Q12 [facility]


   Last Admin: 10/25/18 09:47 Dose:  210 mls/hr


Cefazolin Sodium/Dextrose (Ancef Iv 2 Gm Duplex)  2 gm in 50 mls @ 50 mls/hr 

IVPB Q8 [facility]; Protocol


   Last Admin: 10/25/18 17:19 Dose:  50 mls/hr


Lorazepam (Ativan)  1 mg IVP Q3H PRN


   PRN Reason: Symptoms of alcohol withdrawl


   Last Admin: 10/25/18 12:37 Dose:  1 mg


Multivitamins/Minerals (Therapeutic-M Tab)  1 tab PO DAILY [facility]


   Last Admin: 10/25/18 14:29 Dose:  1 tab


Thiamine HCl (Vitamin B1 Tab)  100 mg PO DAILY [facility]


   Last Admin: 10/25/18 10:10 Dose:  100 mg











- Labs


Labs: 


                                        





                                 10/25/18 05:16 





                                 10/25/18 05:16 





                                        











PT  12.6 Seconds (9.8-13.1)   10/19/18  23:05    


 


INR  1.1   10/19/18  23:05    


 


APTT  36.2 Seconds (25.6-37.1)   10/19/18  23:05    














Attending/Attestation





- Attestation


I have personally seen and examined this patient.: Yes


I have fully participated in the care of the patient.: Yes


I have reviewed all pertinent clinical information, including history, physical 

exam and plan: Yes


Notes (Text): 








mental status improved





1. Status Epilepticus due to non compliance of medication and most probably to 

Alcohol withdrawal.





2. Alcohol Withdrawal


-acute resolved 





3. Transaminitis





4. Bacteremia - suspected endocarditis

## 2018-10-25 NOTE — RAD
Date of service: 



10/25/2018



HISTORY:

 Post PICC insertion: confirm placement 



COMPARISON:

Portable chest 10/19/2018. 



FINDINGS:

Right upper extremity PICC inserted terminating at the cavoatrial 

junction or possibly upper right atrium. 



LUNGS:

No active pulmonary disease.



PLEURA:

No significant pleural effusion identified, no pneumothorax apparent.



CARDIOVASCULAR:

No aortic atherosclerotic calcification present



Stable cardiomediastinal silhouette exclusive of right PICC.



OSSEOUS STRUCTURES:

No significant abnormalities.



VISUALIZED UPPER ABDOMEN:

Normal.



OTHER FINDINGS:

None.



IMPRESSION:

Right up straight PICC inserted as discussed above.  Exam otherwise 

stable an nonacute in the interval.

## 2018-10-26 LAB
ALBUMIN SERPL-MCNC: 3 G/DL (ref 3.5–5)
ALBUMIN/GLOB SERPL: 0.8 {RATIO} (ref 1–2.1)
ALT SERPL-CCNC: 163 U/L (ref 21–72)
AST SERPL-CCNC: 157 U/L (ref 17–59)
BASOPHILS # BLD AUTO: 0.1 K/UL (ref 0–0.2)
BASOPHILS NFR BLD: 1.4 % (ref 0–2)
BUN SERPL-MCNC: 8 MG/DL (ref 9–20)
CALCIUM SERPL-MCNC: 8.7 MG/DL (ref 8.4–10.2)
EOSINOPHIL # BLD AUTO: 0.1 K/UL (ref 0–0.7)
EOSINOPHIL NFR BLD: 2.4 % (ref 0–4)
ERYTHROCYTE [DISTWIDTH] IN BLOOD BY AUTOMATED COUNT: 13.5 % (ref 11.5–14.5)
GFR NON-AFRICAN AMERICAN: > 60
HGB BLD-MCNC: 11.6 G/DL (ref 12–18)
LYMPHOCYTES # BLD AUTO: 1.3 K/UL (ref 1–4.3)
LYMPHOCYTES NFR BLD AUTO: 25 % (ref 20–40)
MCH RBC QN AUTO: 29.8 PG (ref 27–31)
MCHC RBC AUTO-ENTMCNC: 32.9 G/DL (ref 33–37)
MCV RBC AUTO: 90.7 FL (ref 80–94)
MONOCYTES # BLD: 0.9 K/UL (ref 0–0.8)
MONOCYTES NFR BLD: 17.1 % (ref 0–10)
NEUTROPHILS # BLD: 2.7 K/UL (ref 1.8–7)
NEUTROPHILS NFR BLD AUTO: 54.1 % (ref 50–75)
NRBC BLD AUTO-RTO: 0.1 % (ref 0–0)
PLATELET # BLD: 126 K/UL (ref 130–400)
PMV BLD AUTO: 11 FL (ref 7.2–11.7)
RBC # BLD AUTO: 3.9 MIL/UL (ref 4.4–5.9)
WBC # BLD AUTO: 5.1 K/UL (ref 4.8–10.8)

## 2018-10-26 RX ADMIN — CEFAZOLIN SODIUM SCH MLS/HR: 2 SOLUTION INTRAVENOUS at 00:11

## 2018-10-26 RX ADMIN — CEFAZOLIN SODIUM SCH MLS/HR: 2 SOLUTION INTRAVENOUS at 09:01

## 2018-10-26 RX ADMIN — CEFAZOLIN SODIUM SCH MLS/HR: 2 SOLUTION INTRAVENOUS at 17:18

## 2018-10-26 RX ADMIN — MULTIPLE VITAMINS W/ MINERALS TAB SCH TAB: TAB at 09:03

## 2018-10-26 RX ADMIN — ENOXAPARIN SODIUM SCH MG: 40 INJECTION SUBCUTANEOUS at 09:02

## 2018-10-26 NOTE — CP.PCM.PN
Subjective





- Date & Time of Evaluation


Date of Evaluation: 10/26/18


Time of Evaluation: 09:00





- Subjective


Subjective: 





await MARY


cont rx as ordered 





Objective





- Vital Signs/Intake and Output


Vital Signs (last 24 hours): 


                                        











Temp Pulse Resp BP Pulse Ox


 


 98.5 F   53 L  18   140/69   96 


 


 10/26/18 12:23  10/26/18 12:23  10/26/18 12:23  10/26/18 12:23  10/26/18 12:23











- Medications


Medications: 


                               Current Medications





Acetaminophen (Tylenol 650 Mg Supp)  650 mg [place] Q4 PRN


   PRN Reason: Fever >100.4 F


   Last Admin: 10/20/18 04:35 Dose:  650 mg


Amlodipine Besylate (Norvasc)  5 mg PO DAILY [facility]


   Last Admin: 10/26/18 09:02 Dose:  5 mg


Chlordiazepoxide (Librium)  25 mg PO Q8 [facility]


   Last Admin: 10/26/18 09:07 Dose:  25 mg


Enoxaparin Sodium (Lovenox)  40 mg SC DAILY [facility]; Protocol


   Last Admin: 10/26/18 09:02 Dose:  40 mg


Folic Acid (Folic Acid)  1 mg PO DAILY [facility]


   Last Admin: 10/26/18 09:01 Dose:  1 mg


Levetiracetam 500 mg/ Sodium (Chloride)  105 mls @ 210 mls/hr IVPB Q12 [facility]


   Last Admin: 10/26/18 09:00 Dose:  210 mls/hr


Cefazolin Sodium/Dextrose (Ancef Iv 2 Gm Duplex)  2 gm in 50 mls @ 50 mls/hr 

IVPB Q8 [facility]; Protocol


   Last Admin: 10/26/18 09:01 Dose:  50 mls/hr


Lorazepam (Ativan)  1 mg IVP Q3H PRN


   PRN Reason: Symptoms of alcohol withdrawl


   Last Admin: 10/25/18 21:29 Dose:  1 mg


Multivitamins/Minerals (Therapeutic-M Tab)  1 tab PO DAILY [facility]


   Last Admin: 10/26/18 09:03 Dose:  1 tab


Thiamine HCl (Vitamin B1 Tab)  100 mg PO DAILY [facility]


   Last Admin: 10/26/18 09:03 Dose:  100 mg











- Labs


Labs: 


                                        





                                 10/26/18 04:25 





                                 10/26/18 04:25 





                                        











PT  12.6 Seconds (9.8-13.1)   10/19/18  23:05    


 


INR  1.1   10/19/18  23:05    


 


APTT  36.2 Seconds (25.6-37.1)   10/19/18  23:05    














- Constitutional


Appears: Well





- Head Exam


Head Exam: ATRAUMATIC, NORMAL INSPECTION, NORMOCEPHALIC





- Eye Exam


Eye Exam: EOMI, Normal appearance, PERRL


Pupil Exam: NORMAL ACCOMODATION, PERRL





- ENT Exam


ENT Exam: Mucous Membranes Moist, Normal Exam





- Neck Exam


Neck Exam: Full ROM, Normal Inspection.  absent: Lymphadenopathy





- Respiratory Exam


Respiratory Exam: Clear to Ausculation Bilateral, NORMAL BREATHING PATTERN





- Cardiovascular Exam


Cardiovascular Exam: REGULAR RHYTHM, +S1, +S2.  absent: Murmur





- GI/Abdominal Exam


GI & Abdominal Exam: Soft, Normal Bowel Sounds.  absent: Tenderness





- Rectal Exam


Rectal Exam: NORMAL INSPECTION





- Extremities Exam


Extremities Exam: Full ROM, Normal Capillary Refill, Normal Inspection.  absent:

Joint Swelling, Pedal Edema





- Back Exam


Back Exam: NORMAL INSPECTION





- Neurological Exam


Neurological Exam: Alert, Awake, CN II-XII Intact, Normal Gait, Oriented x3





- Psychiatric Exam


Psychiatric exam: Normal Affect, Normal Mood





- Skin


Skin Exam: Dry, Intact, Normal Color, Warm





Assessment and Plan





- Assessment and Plan (Free Text)


Assessment: 





for MARY

## 2018-10-26 NOTE — CP.PCM.PN
Addendum entered and electronically signed by Vidhya Hanson MD  10/26/18 18:31: 


Patient was seen and examined bedside . All chart and clinical data reviewed . 

Case discussed with resident.Agree with assessment and plan. Hemodynamically 

stable, afebrile


At present he is Awake , alert oriented x 3 , answering all questions 

appropriately and ambulating with stable gate with walker 


Discontinued Avasyst 


Repeat blood cultures with no growth so far. Only 1 bottle positive for MSSA 


Suspected endocarditis ??


Plan for MARY on Monday to definitely rule in or out endocarditis and decide 

about how long IV antibiotic treatment will  be needed 


ID and cardiology on board 


on Ancef IV as per ID 


Continue Seizure precautions and Keppra























Original Note:








<Ariella Lund - Last Filed: 10/26/18 16:22>





Subjective





- Date & Time of Evaluation


Date of Evaluation: 10/26/18


Time of Evaluation: 09:00





- Subjective


Subjective: 


No acute overnight events. Pt off 1:1 now on avisis, needed ativan overnight. 

Disoriented to place and time today. Denies fevers, nausea, vomiting, diarrhea 

or constipation. 








Objective





- Vital Signs/Intake and Output


Vital Signs (last 24 hours): 


                                        











Temp Pulse Resp BP Pulse Ox


 


 98.5 F   52 L  18   143/71   97 


 


 10/26/18 08:19  10/26/18 08:19  10/26/18 08:19  10/26/18 08:19  10/26/18 08:19











- Medications


Medications: 


                               Current Medications





Acetaminophen (Tylenol 650 Mg Supp)  650 mg [place] Q4 PRN


   PRN Reason: Fever >100.4 F


   Last Admin: 10/20/18 04:35 Dose:  650 mg


Amlodipine Besylate (Norvasc)  5 mg PO DAILY [facility]


   Last Admin: 10/26/18 09:02 Dose:  5 mg


Chlordiazepoxide (Librium)  25 mg PO Q8 [facility]


   Last Admin: 10/26/18 09:07 Dose:  25 mg


Enoxaparin Sodium (Lovenox)  40 mg SC DAILY [facility]; Protocol


   Last Admin: 10/26/18 09:02 Dose:  40 mg


Folic Acid (Folic Acid)  1 mg PO DAILY [facility]


   Last Admin: 10/26/18 09:01 Dose:  1 mg


Levetiracetam 500 mg/ Sodium (Chloride)  105 mls @ 210 mls/hr IVPB Q12 [facility]


   Last Admin: 10/26/18 09:00 Dose:  210 mls/hr


Cefazolin Sodium/Dextrose (Ancef Iv 2 Gm Duplex)  2 gm in 50 mls @ 50 mls/hr 

IVPB Q8 [facility]; Protocol


   Last Admin: 10/26/18 09:01 Dose:  50 mls/hr


Lorazepam (Ativan)  1 mg IVP Q3H PRN


   PRN Reason: Symptoms of alcohol withdrawl


   Last Admin: 10/25/18 21:29 Dose:  1 mg


Multivitamins/Minerals (Therapeutic-M Tab)  1 tab PO DAILY [facility]


   Last Admin: 10/26/18 09:03 Dose:  1 tab


Thiamine HCl (Vitamin B1 Tab)  100 mg PO DAILY [facility]


   Last Admin: 10/26/18 09:03 Dose:  100 mg











- Labs


Labs: 


                                        





                                 10/26/18 04:25 





                                 10/26/18 04:25 





                                        











PT  12.6 Seconds (9.8-13.1)   10/19/18  23:05    


 


INR  1.1   10/19/18  23:05    


 


APTT  36.2 Seconds (25.6-37.1)   10/19/18  23:05    














- Constitutional


Appears: Non-toxic, No Acute Distress





- Head Exam


Head Exam: ATRAUMATIC, NORMAL INSPECTION, NORMOCEPHALIC





- Eye Exam


Eye Exam: EOMI, Scleral icterus





- ENT Exam


ENT Exam: Mucous Membranes Moist





- Respiratory Exam


Respiratory Exam: Clear to Ausculation Bilateral, NORMAL BREATHING PATTERN





- Cardiovascular Exam


Cardiovascular Exam: RRR, +S1, +S2





- GI/Abdominal Exam


GI & Abdominal Exam: Soft, Normal Bowel Sounds





- Extremities Exam


Extremities Exam: Normal Inspection





- Neurological Exam


Neurological Exam: Alert, Awake (Oriented to person, not time or place)





Assessment and Plan





- Assessment and Plan (Free Text)


Assessment: 


59 yo M with hx of non compliance with meds, Alcohol abuse and seizure, brought 

to the ED with  recurrent witnessed seizures, alcohol withdrawl





1. Status Epilepticus due to non compliance of medication and most probably to 

Alcohol withdrawal.


-Resolved


-Neurology consult appreciated Dr Mari


-f/u EEG


-c/w Keppra 500mg Q12H, Ativan 1mg PRN


-Seizure precaution 


-PT recommends skilled PT 


-F/u CMP in AM





2. Alcohol Withdrawal


-acute resolved 


-CIWA score 0, Alc level <10


-Ativan PRN


-Librium 25mg Q8h


-Folic Acid and Thiamine PO





3. Transaminitis


-MELD score 8


-Hep C ab-reactive, F/u Hep C RNA and genotype


-,  trending down


-Abd us-Diffuse increased liver echogenicity compatible with fatty or other 

liver pathology, no masses or dilated ducts 


-HIV neg


-Non immune Hep B, Hep A negative





4. Bacteremia 


-resolving, afebrile since admission, no wbc


-blood cx-staph aureus  


-ID consult appreciated- Dr. Dumas


-Ancef 2gm @50ml/hr Q8hr Day #3


-D/c vanc recieved day 4/4


-Blood cx now day 4 Normal, Procalcitonin low


-Echo possible vegetation reported vs sclerotic AV, Moderate AR


-Cardiologist consult appreciated- Dr. Barnes


-Dr. Valentine will be performing MARY


-F/u CBC in AM, MARY results





5.Hypokalemic


-resolving, K 3.5


-Gave K 40meq PO 


-Mg WNL 1.6


-F/u CMP in AM 





5. Elevated blood pressures secondary to Alcohol withdrawal


-resolving


-monitor BP


-Norvasc 5mg QD





6. DVT prophylaxis with SCD


-Lovenox 40mg





7.Code Status


-Full





<Jazmín Campos - Last Filed: 10/26/18 17:47>





Objective





- Vital Signs/Intake and Output


Vital Signs (last 24 hours): 


                                        











Temp Pulse Resp BP Pulse Ox


 


 99.3 F   78   20   145/71   99 


 


 10/26/18 15:24  10/26/18 15:24  10/26/18 15:24  10/26/18 15:24  10/26/18 15:24











- Medications


Medications: 


                               Current Medications





Acetaminophen (Tylenol 650 Mg Supp)  650 mg SD Q4 PRN


   PRN Reason: Fever >100.4 F


   Last Admin: 10/20/18 04:35 Dose:  650 mg


Amlodipine Besylate (Norvasc)  5 mg PO DAILY [facility]


   Last Admin: 10/26/18 09:02 Dose:  5 mg


Chlordiazepoxide (Librium)  25 mg PO Q8 [facility]


   Last Admin: 10/26/18 17:22 Dose:  25 mg


Enoxaparin Sodium (Lovenox)  40 mg SC DAILY [facility]; Protocol


   Last Admin: 10/26/18 09:02 Dose:  40 mg


Folic Acid (Folic Acid)  1 mg PO DAILY [facility]


   Last Admin: 10/26/18 09:01 Dose:  1 mg


Levetiracetam 500 mg/ Sodium (Chloride)  105 mls @ 210 mls/hr IVPB Q12 [facility]


   Last Admin: 10/26/18 09:00 Dose:  210 mls/hr


Cefazolin Sodium/Dextrose (Ancef Iv 2 Gm Duplex)  2 gm in 50 mls @ 50 mls/hr 

IVPB Q8 [facility]; Protocol


   Last Admin: 10/26/18 17:18 Dose:  50 mls/hr


Lorazepam (Ativan)  1 mg IVP Q3H PRN


   PRN Reason: Symptoms of alcohol withdrawl


   Last Admin: 10/25/18 21:29 Dose:  1 mg


Multivitamins/Minerals (Therapeutic-M Tab)  1 tab PO DAILY [facility]


   Last Admin: 10/26/18 09:03 Dose:  1 tab


Thiamine HCl (Vitamin B1 Tab)  100 mg PO DAILY [facility]


   Last Admin: 10/26/18 09:03 Dose:  100 mg











- Labs


Labs: 


                                        





                                 10/26/18 04:25 





                                 10/26/18 04:25 





                                        











PT  12.6 Seconds (9.8-13.1)   10/19/18  23:05    


 


INR  1.1   10/19/18  23:05    


 


APTT  36.2 Seconds (25.6-37.1)   10/19/18  23:05    














Attending/Attestation





- Attestation


I have personally seen and examined this patient.: Yes


I have fully participated in the care of the patient.: Yes


I have reviewed all pertinent clinical information, including history, physical 

exam and plan: Yes


Notes (Text): 





10/26/18 17:47


Agree with findings and plan as above.

## 2018-10-26 NOTE — CP.PCM.PN
Subjective





- Date & Time of Evaluation


Date of Evaluation: 10/26/18


Time of Evaluation: 07:00





- Subjective


Subjective: 





improving


await neg bloood cultures


may need antibiotic rx thru pregnancy


Gyn follow up





Objective





- Vital Signs/Intake and Output


Vital Signs (last 24 hours): 


                                        











Temp Pulse Resp BP Pulse Ox


 


 98.5 F   53 L  18   140/69   96 


 


 10/26/18 12:23  10/26/18 12:23  10/26/18 12:23  10/26/18 12:23  10/26/18 12:23











- Medications


Medications: 


                               Current Medications





Acetaminophen (Tylenol 650 Mg Supp)  650 mg [place] Q4 PRN


   PRN Reason: Fever >100.4 F


   Last Admin: 10/20/18 04:35 Dose:  650 mg


Amlodipine Besylate (Norvasc)  5 mg PO DAILY [facility]


   Last Admin: 10/26/18 09:02 Dose:  5 mg


Chlordiazepoxide (Librium)  25 mg PO Q8 [facility]


   Last Admin: 10/26/18 09:07 Dose:  25 mg


Enoxaparin Sodium (Lovenox)  40 mg SC DAILY [facility]; Protocol


   Last Admin: 10/26/18 09:02 Dose:  40 mg


Folic Acid (Folic Acid)  1 mg PO DAILY [facility]


   Last Admin: 10/26/18 09:01 Dose:  1 mg


Levetiracetam 500 mg/ Sodium (Chloride)  105 mls @ 210 mls/hr IVPB Q12 [facility]


   Last Admin: 10/26/18 09:00 Dose:  210 mls/hr


Cefazolin Sodium/Dextrose (Ancef Iv 2 Gm Duplex)  2 gm in 50 mls @ 50 mls/hr 

IVPB Q8 [facility]; Protocol


   Last Admin: 10/26/18 09:01 Dose:  50 mls/hr


Lorazepam (Ativan)  1 mg IVP Q3H PRN


   PRN Reason: Symptoms of alcohol withdrawl


   Last Admin: 10/25/18 21:29 Dose:  1 mg


Multivitamins/Minerals (Therapeutic-M Tab)  1 tab PO DAILY [facility]


   Last Admin: 10/26/18 09:03 Dose:  1 tab


Thiamine HCl (Vitamin B1 Tab)  100 mg PO DAILY [facility]


   Last Admin: 10/26/18 09:03 Dose:  100 mg











- Labs


Labs: 


                                        





                                 10/26/18 04:25 





                                 10/26/18 04:25 





                                        











PT  12.6 Seconds (9.8-13.1)   10/19/18  23:05    


 


INR  1.1   10/19/18  23:05    


 


APTT  36.2 Seconds (25.6-37.1)   10/19/18  23:05    














- Constitutional


Appears: Well





- Head Exam


Head Exam: ATRAUMATIC, NORMAL INSPECTION, NORMOCEPHALIC





- Eye Exam


Eye Exam: EOMI, Normal appearance, PERRL


Pupil Exam: NORMAL ACCOMODATION, PERRL





- ENT Exam


ENT Exam: Mucous Membranes Moist, Normal Exam





- Neck Exam


Neck Exam: Full ROM, Normal Inspection.  absent: Lymphadenopathy





- Respiratory Exam


Respiratory Exam: Clear to Ausculation Bilateral, NORMAL BREATHING PATTERN





- Cardiovascular Exam


Cardiovascular Exam: REGULAR RHYTHM, +S1, +S2.  absent: Murmur





- GI/Abdominal Exam


GI & Abdominal Exam: Soft, Normal Bowel Sounds.  absent: Tenderness





- Rectal Exam


Rectal Exam: NORMAL INSPECTION





- Extremities Exam


Extremities Exam: Full ROM, Normal Capillary Refill, Normal Inspection.  absent:

Joint Swelling, Pedal Edema





- Back Exam


Back Exam: NORMAL INSPECTION





- Neurological Exam


Neurological Exam: Alert, Awake, CN II-XII Intact, Normal Gait, Oriented x3





- Psychiatric Exam


Psychiatric exam: Normal Affect, Normal Mood





- Skin


Skin Exam: Dry, Intact, Normal Color, Warm





Assessment and Plan





- Assessment and Plan (Free Text)


Assessment: 





cont iv antibiotics

## 2018-10-27 LAB
ALBUMIN SERPL-MCNC: 3.5 G/DL (ref 3.5–5)
ALBUMIN/GLOB SERPL: 0.9 {RATIO} (ref 1–2.1)
ALT SERPL-CCNC: 234 U/L (ref 21–72)
AST SERPL-CCNC: 246 U/L (ref 17–59)
BASOPHILS # BLD AUTO: 0 K/UL (ref 0–0.2)
BASOPHILS NFR BLD: 0.5 % (ref 0–2)
BUN SERPL-MCNC: 10 MG/DL (ref 9–20)
CALCIUM SERPL-MCNC: 9.2 MG/DL (ref 8.4–10.2)
EOSINOPHIL # BLD AUTO: 0.2 K/UL (ref 0–0.7)
EOSINOPHIL NFR BLD: 2.5 % (ref 0–4)
ERYTHROCYTE [DISTWIDTH] IN BLOOD BY AUTOMATED COUNT: 13.4 % (ref 11.5–14.5)
GFR NON-AFRICAN AMERICAN: > 60
HGB BLD-MCNC: 12.4 G/DL (ref 12–18)
LYMPHOCYTES # BLD AUTO: 1.9 K/UL (ref 1–4.3)
LYMPHOCYTES NFR BLD AUTO: 27.5 % (ref 20–40)
MCH RBC QN AUTO: 29.7 PG (ref 27–31)
MCHC RBC AUTO-ENTMCNC: 32.8 G/DL (ref 33–37)
MCV RBC AUTO: 90.5 FL (ref 80–94)
MONOCYTES # BLD: 1.3 K/UL (ref 0–0.8)
MONOCYTES NFR BLD: 18.4 % (ref 0–10)
NEUTROPHILS # BLD: 3.6 K/UL (ref 1.8–7)
NEUTROPHILS NFR BLD AUTO: 51.1 % (ref 50–75)
NRBC BLD AUTO-RTO: 0.1 % (ref 0–0)
PLATELET # BLD: 146 K/UL (ref 130–400)
PMV BLD AUTO: 11.5 FL (ref 7.2–11.7)
RBC # BLD AUTO: 4.19 MIL/UL (ref 4.4–5.9)
WBC # BLD AUTO: 7 K/UL (ref 4.8–10.8)

## 2018-10-27 RX ADMIN — CEFAZOLIN SODIUM SCH MLS/HR: 2 SOLUTION INTRAVENOUS at 17:34

## 2018-10-27 RX ADMIN — MULTIPLE VITAMINS W/ MINERALS TAB SCH TAB: TAB at 09:20

## 2018-10-27 RX ADMIN — CEFAZOLIN SODIUM SCH MLS/HR: 2 SOLUTION INTRAVENOUS at 00:17

## 2018-10-27 RX ADMIN — CEFAZOLIN SODIUM SCH MLS/HR: 2 SOLUTION INTRAVENOUS at 09:18

## 2018-10-27 RX ADMIN — ENOXAPARIN SODIUM SCH MG: 40 INJECTION SUBCUTANEOUS at 09:21

## 2018-10-27 NOTE — CP.PCM.PN
Addendum entered and electronically signed by Vidhya Hanson MD  10/27/18 12:19: 


Patient was seen and examined bedside . All chart and clinical data reviewed . 

Case discussed with resident.Agree with assessment and plan. Hemodynamically 

stable, afebrile.


No acute issues overnight . His gate is stable and his is AAOx3 


1 blood cx so far has been reported as positive for MSSA 


Repeat blood cultures with no growth so far


Plan for MARY on Monday to definitely rule in or out endocarditis and decide 

about how long IV antibiotic treatment will  be needed 


ID and cardiology on board 


on Ancef IV as per ID 


Continue Seizure precautions and Keppra











Original Note:








Subjective





- Date & Time of Evaluation


Date of Evaluation: 10/27/18


Time of Evaluation: 07:30





- Subjective


Subjective: 


Patient seen and examined bedside sitting on bed AAO x3. reports no complains , 

no overnight events. 


no withdrawal symptoms. 








Objective





- Vital Signs/Intake and Output


Vital Signs (last 24 hours): 


                                        











Temp Pulse Resp BP Pulse Ox


 


 98.1 F   54 L  18   127/68   98 


 


 10/27/18 08:00  10/27/18 08:00  10/27/18 08:00  10/27/18 08:00  10/27/18 08:00











- Medications


Medications: 


                               Current Medications





Acetaminophen (Tylenol 650 Mg Supp)  650 mg [place] Q4 PRN


   PRN Reason: Fever >100.4 F


   Last Admin: 10/20/18 04:35 Dose:  650 mg


Amlodipine Besylate (Norvasc)  5 mg PO DAILY [facility]


   Last Admin: 10/26/18 09:02 Dose:  5 mg


Chlordiazepoxide (Librium)  25 mg PO Q8 [facility]


   Last Admin: 10/27/18 00:17 Dose:  25 mg


Enoxaparin Sodium (Lovenox)  40 mg SC DAILY [facility]; Protocol


   Last Admin: 10/26/18 09:02 Dose:  40 mg


Folic Acid (Folic Acid)  1 mg PO DAILY [facility]


   Last Admin: 10/26/18 09:01 Dose:  1 mg


Levetiracetam 500 mg/ Sodium (Chloride)  105 mls @ 210 mls/hr IVPB Q12 [facility]


   Last Admin: 10/26/18 20:55 Dose:  210 mls/hr


Cefazolin Sodium/Dextrose (Ancef Iv 2 Gm Duplex)  2 gm in 50 mls @ 50 mls/hr 

IVPB Q8 [facility]; Protocol


   Last Admin: 10/27/18 00:17 Dose:  50 mls/hr


Lorazepam (Ativan)  1 mg IVP Q3H PRN


   PRN Reason: Symptoms of alcohol withdrawl


   Last Admin: 10/25/18 21:29 Dose:  1 mg


Multivitamins/Minerals (Therapeutic-M Tab)  1 tab PO DAILY [facility]


   Last Admin: 10/26/18 09:03 Dose:  1 tab


Thiamine HCl (Vitamin B1 Tab)  100 mg PO DAILY [facility]


   Last Admin: 10/26/18 09:03 Dose:  100 mg











- Labs


Labs: 


                                        





                                 10/27/18 05:40 





                                 10/27/18 05:40 





                                        











PT  12.6 Seconds (9.8-13.1)   10/19/18  23:05    


 


INR  1.1   10/19/18  23:05    


 


APTT  36.2 Seconds (25.6-37.1)   10/19/18  23:05    














- Respiratory Exam


Respiratory Exam: Decreased Breath Sounds (diminished bibasal but not rales. ). 

absent: Rales, Rhonchi





- Cardiovascular Exam


Cardiovascular Exam: REGULAR RHYTHM, +S1, +S2





- GI/Abdominal Exam


GI & Abdominal Exam: Soft, Normal Bowel Sounds





- Extremities Exam


Additional comments: 





 RA PICC





- Neurological Exam


Neurological Exam: Alert, Awake, Oriented x3





- Psychiatric Exam


Psychiatric exam: Normal Affect, Normal Mood





- Skin


Skin Exam: Intact





Assessment and Plan





- Assessment and Plan (Free Text)


Plan: 





57 yo M with hx of non compliance with meds, Alcohol abuse and seizure, brought 

to the ED with  recurrent witnessed seizures, alcohol withdrawl. on telemetry 

now, no withdrawal symptoms, had bacteremia MSSA, questionable endocarditis, IV 

picc line. waiting for TTE on monday and decide plan. 





1. Status Epilepticus due to non compliance of medication and most probably to 

Alcohol withdrawal.


-Resolved


-Neurology consult appreciated Dr Mari


-f/u EEG


-swithed keppra PO oral 500mg Q12H, Ativan 1mg PRN


-Seizure precaution 


-PT recommends skilled PT 


-F/u CMP in AM





2. Alcohol Withdrawal


-resolved 


-CIWA score 0, Alc level <10


-Ativan PRN


-Librium 25mg Q8h


-Folic Acid and Thiamine PO





3. Transaminitis


-MELD score 8


-Hep C ab-reactive, F/u Hep C RNA and genotype


-,  trending down


-Abd us-Diffuse increased liver echogenicity compatible with fatty or other 

liver pathology, no masses or dilated ducts 


-HIV neg


-Non immune Hep B, Hep A negative





4. Bacteremia 


-resolved


-blood cx-staph aureus  


-ID consult appreciated- Dr. Dumas


-Ancef 2gm @50ml/hr Q8hr Day #3


-Blood cxs normalized


-Echo possible vegetation reported vs sclerotic AV, Moderate AR


-Cardiologist consult appreciated- Dr. Barnes


-Dr. Valentine will be performing MARY on monday 


-F/u MARY 





5.Hypokalemia


-K dur 20 given today  





5.HTN


-Norvasc 5mg QD


--monitor BP





6. DVT prophylaxis with SCD


-Lovenox 40mg





7.Code Status


-Full

## 2018-10-28 LAB
ALBUMIN SERPL-MCNC: 3.5 G/DL (ref 3.5–5)
ALBUMIN/GLOB SERPL: 0.8 {RATIO} (ref 1–2.1)
ALT SERPL-CCNC: 262 U/L (ref 21–72)
AST SERPL-CCNC: 310 U/L (ref 17–59)
BUN SERPL-MCNC: 11 MG/DL (ref 9–20)
CALCIUM SERPL-MCNC: 9.2 MG/DL (ref 8.4–10.2)
GFR NON-AFRICAN AMERICAN: > 60

## 2018-10-28 RX ADMIN — MULTIPLE VITAMINS W/ MINERALS TAB SCH TAB: TAB at 08:30

## 2018-10-28 RX ADMIN — CEFAZOLIN SODIUM SCH MLS/HR: 2 SOLUTION INTRAVENOUS at 16:54

## 2018-10-28 RX ADMIN — ENOXAPARIN SODIUM SCH MG: 40 INJECTION SUBCUTANEOUS at 08:35

## 2018-10-28 RX ADMIN — CEFAZOLIN SODIUM SCH MLS/HR: 2 SOLUTION INTRAVENOUS at 02:04

## 2018-10-28 RX ADMIN — CEFAZOLIN SODIUM SCH MLS/HR: 2 SOLUTION INTRAVENOUS at 08:37

## 2018-10-28 NOTE — CP.PCM.PN
Subjective





- Date & Time of Evaluation


Date of Evaluation: 10/28/18


Time of Evaluation: 09:00





- Subjective


Subjective: 


No acute overnight events. Pt needed ativan overnight, today feels well 

ambulating well with walker. Waxing and waning orientation, not oriented to 

time, may be his baseline. Denies fevers, chills, shaking, seizures, nausea, 

vomiting, diarrhea, constipation.  Good appetite. 








Objective





- Vital Signs/Intake and Output


Vital Signs (last 24 hours): 


                                        











Temp Pulse Resp BP Pulse Ox


 


 97.4 F L  33 L  18   123/74   100 


 


 10/28/18 08:00  10/28/18 08:34  10/28/18 08:00  10/28/18 08:34  10/28/18 08:00








Intake and Output: 


                                        











 10/28/18 10/28/18





 06:59 18:59


 


Intake Total 150 


 


Balance 150 














- Medications


Medications: 


                               Current Medications





Acetaminophen (Tylenol 650 Mg Supp)  650 mg [place] Q4 PRN


   PRN Reason: Fever >100.4 F


   Last Admin: 10/20/18 04:35 Dose:  650 mg


Amlodipine Besylate (Norvasc)  5 mg PO DAILY [facility]


   Last Admin: 10/28/18 08:34 Dose:  5 mg


Chlordiazepoxide (Librium)  25 mg PO Q8 [facility]


   Last Admin: 10/28/18 08:30 Dose:  25 mg


Enoxaparin Sodium (Lovenox)  40 mg SC DAILY [facility]; Protocol


   Last Admin: 10/28/18 08:35 Dose:  40 mg


Folic Acid (Folic Acid)  1 mg PO DAILY [facility]


   Last Admin: 10/28/18 08:30 Dose:  1 mg


Cefazolin Sodium/Dextrose (Ancef Iv 2 Gm Duplex)  2 gm in 50 mls @ 50 mls/hr 

IVPB Q8 [facility]; Protocol


   Last Admin: 10/28/18 08:37 Dose:  50 mls/hr


Levetiracetam (Keppra)  500 mg PO BID [facility]


   Last Admin: 10/28/18 08:30 Dose:  500 mg


Lorazepam (Ativan)  1 mg IVP Q3H PRN


   PRN Reason: Symptoms of alcohol withdrawl


   Last Admin: 10/27/18 23:42 Dose:  1 mg


Multivitamins/Minerals (Therapeutic-M Tab)  1 tab PO DAILY [facility]


   Last Admin: 10/28/18 08:30 Dose:  1 tab


Thiamine HCl (Vitamin B1 Tab)  100 mg PO DAILY [facility]


   Last Admin: 10/28/18 08:34 Dose:  100 mg











- Labs


Labs: 


                                        





                                 10/27/18 05:40 





                                 10/27/18 05:40 





                                        











PT  12.6 Seconds (9.8-13.1)   10/19/18  23:05    


 


INR  1.1   10/19/18  23:05    


 


APTT  36.2 Seconds (25.6-37.1)   10/19/18  23:05    














- Constitutional


Appears: Non-toxic, No Acute Distress





- Head Exam


Head Exam: ATRAUMATIC, NORMAL INSPECTION, NORMOCEPHALIC





- Eye Exam


Eye Exam: EOMI, Normal appearance





- ENT Exam


ENT Exam: Mucous Membranes Moist





- Respiratory Exam


Respiratory Exam: Clear to Ausculation Bilateral, NORMAL BREATHING PATTERN





- Cardiovascular Exam


Cardiovascular Exam: RRR, +S1, +S2





- GI/Abdominal Exam


GI & Abdominal Exam: Soft, Normal Bowel Sounds





- Extremities Exam


Extremities Exam: Normal Inspection


Additional comments: 


Right arm Picc line








- Neurological Exam


Neurological Exam: Alert, Awake (Oriented to person and place), Normal Gait





Assessment and Plan





- Assessment and Plan (Free Text)


Assessment: 


59 yo M with hx of non compliance with meds, Alcohol abuse and seizure, brought 

to the ED with  recurrent witnessed seizures, alcohol withdrawl. on telemetry 

now, no withdrawal symptoms, had bacteremia MSSA, questionable endocarditis, IV 

picc line. waiting for MARY on monday and decide plan. 





1. Status Epilepticus due to non compliance of medication and most probably to 

Alcohol withdrawal.


-Resolved


-Neurology consult appreciated Dr Mari


-f/u EEG


-Keppra 500mg PO Q12H, Ativan 1mg PRN


-Seizure precaution 


-PT recommends skilled PT 


-F/u CMP in AM





2. Alcohol Withdrawal


-resolved 


-CIWA score 0, Alc level <10


-Ativan PRN


-Librium 25mg Q8h


-Folic Acid and Thiamine PO





3. Transaminitis


-MELD score 7


-Hep C ab-reactive, F/u Hep C RNA and genotype


-,  trending up


-Abd us-Diffuse increased liver echogenicity compatible with fatty or other 

liver pathology, no masses or dilated ducts 


-HIV neg


-Non immune Hep B, Hep A negative





4. Bacteremia 


-resolved


-blood cx-staph aureus  


-ID consult appreciated- Dr. Dumas


-Picc line placed, Ancef 2gm @50ml/hr Q8hr Day #4


-Blood cxs normalized


-Echo possible vegetation reported vs sclerotic AV, Moderate AR


-Cardiologist consult appreciated- Dr. Barnes


-Dr. Valentine will be performing MARY on monday 


-F/u MARY 


-NPO tonight for MARY in AM





5.Hypokalemia


-resolved


-K 4.0





5.HTN


-Norvasc 5mg QD


-monitor BP





6. DVT prophylaxis


-Lovenox 40mg





7.Code Status


-Full

## 2018-10-28 NOTE — CP.PCM.PN
Subjective





- Date & Time of Evaluation


Date of Evaluation: 10/28/18


Time of Evaluation: 08:00





- Subjective


Subjective: 





iv rx in progress 


await MARY 





Objective





- Vital Signs/Intake and Output


Vital Signs (last 24 hours): 


                                        











Temp Pulse Resp BP Pulse Ox


 


 97.4 F L  61   18   123/74   100 


 


 10/28/18 08:00  10/28/18 09:00  10/28/18 08:00  10/28/18 08:34  10/28/18 08:00








Intake and Output: 


                                        











 10/28/18 10/28/18





 06:59 18:59


 


Intake Total 150 


 


Balance 150 














- Medications


Medications: 


                               Current Medications





Acetaminophen (Tylenol 650 Mg Supp)  650 mg [place] Q4 PRN


   PRN Reason: Fever >100.4 F


   Last Admin: 10/20/18 04:35 Dose:  650 mg


Amlodipine Besylate (Norvasc)  5 mg PO DAILY [facility]


   Last Admin: 10/28/18 08:34 Dose:  5 mg


Chlordiazepoxide (Librium)  25 mg PO Q8 [facility]


   Last Admin: 10/28/18 08:30 Dose:  25 mg


Enoxaparin Sodium (Lovenox)  40 mg SC DAILY [facility]; Protocol


   Last Admin: 10/28/18 08:35 Dose:  40 mg


Folic Acid (Folic Acid)  1 mg PO DAILY [facility]


   Last Admin: 10/28/18 08:30 Dose:  1 mg


Cefazolin Sodium/Dextrose (Ancef Iv 2 Gm Duplex)  2 gm in 50 mls @ 50 mls/hr 

IVPB Q8 [facility]; Protocol


   Last Admin: 10/28/18 08:37 Dose:  50 mls/hr


Levetiracetam (Keppra)  500 mg PO BID [facility]


   Last Admin: 10/28/18 08:30 Dose:  500 mg


Lorazepam (Ativan)  1 mg IVP Q3H PRN


   PRN Reason: Symptoms of alcohol withdrawl


   Last Admin: 10/27/18 23:42 Dose:  1 mg


Multivitamins/Minerals (Therapeutic-M Tab)  1 tab PO DAILY [facility]


   Last Admin: 10/28/18 08:30 Dose:  1 tab


Thiamine HCl (Vitamin B1 Tab)  100 mg PO DAILY [facility]


   Last Admin: 10/28/18 08:34 Dose:  100 mg











- Labs


Labs: 


                                        





                                 10/27/18 05:40 





                                 10/28/18 06:00 





                                        











PT  12.6 Seconds (9.8-13.1)   10/19/18  23:05    


 


INR  1.1   10/19/18  23:05    


 


APTT  36.2 Seconds (25.6-37.1)   10/19/18  23:05    














- Constitutional


Appears: Non-toxic, Chronically Ill





- Head Exam


Head Exam: NORMOCEPHALIC





- Eye Exam


Eye Exam: PERRL.  absent: Scleral icterus





- ENT Exam


ENT Exam: Mucous Membranes Dry





- Neck Exam


Neck Exam: absent: Lymphadenopathy





- Respiratory Exam


Respiratory Exam: Decreased Breath Sounds





- Cardiovascular Exam


Cardiovascular Exam: REGULAR RHYTHM





- GI/Abdominal Exam


GI & Abdominal Exam: Distended, Soft.  absent: Tenderness





- Rectal Exam


Rectal Exam: Deferred





- Extremities Exam


Extremities Exam: absent: Pedal Edema





- Back Exam


Back Exam: absent: CVA tenderness (L), CVA tenderness (R)





Assessment and Plan





- Assessment and Plan (Free Text)


Assessment: 





cont iv ancef 


for MARY

## 2018-10-29 LAB
ALBUMIN SERPL-MCNC: 3.6 G/DL (ref 3.5–5)
ALBUMIN/GLOB SERPL: 0.8 {RATIO} (ref 1–2.1)
ALT SERPL-CCNC: 242 U/L (ref 21–72)
AST SERPL-CCNC: 262 U/L (ref 17–59)
BUN SERPL-MCNC: 13 MG/DL (ref 9–20)
CALCIUM SERPL-MCNC: 9.4 MG/DL (ref 8.4–10.2)
GFR NON-AFRICAN AMERICAN: > 60

## 2018-10-29 RX ADMIN — CEFAZOLIN SODIUM SCH MLS/HR: 2 SOLUTION INTRAVENOUS at 17:34

## 2018-10-29 RX ADMIN — MULTIPLE VITAMINS W/ MINERALS TAB SCH TAB: TAB at 09:12

## 2018-10-29 RX ADMIN — CEFAZOLIN SODIUM SCH MLS/HR: 2 SOLUTION INTRAVENOUS at 00:52

## 2018-10-29 RX ADMIN — CEFAZOLIN SODIUM SCH MLS/HR: 2 SOLUTION INTRAVENOUS at 09:13

## 2018-10-29 NOTE — CP.PCM.PN
Subjective





- Date & Time of Evaluation


Date of Evaluation: 10/29/18


Time of Evaluation: 09:00





- Subjective


Subjective: 


No acute overnight events. Feels well, stable gait, alert and oriented x 3. 

Denies fevers, chills, nausea, vomiting, diarrhea, constipation, chest pain, 

SOB. Pt was supposed to go for MARY, was postponed pending date, eating 

breakfast. 





Objective





- Vital Signs/Intake and Output


Vital Signs (last 24 hours): 


                                        











Temp Pulse Resp BP Pulse Ox


 


 98 F   98 H  18   124/66   97 


 


 10/29/18 08:20  10/29/18 09:12  10/29/18 08:20  10/29/18 09:12  10/29/18 08:20








Intake and Output: 


                                        











 10/29/18 10/29/18





 06:59 18:59


 


Intake Total 30 


 


Balance 30 














- Medications


Medications: 


                               Current Medications





Acetaminophen (Tylenol 650 Mg Supp)  650 mg [place] Q4 PRN


   PRN Reason: Fever >100.4 F


   Last Admin: 10/20/18 04:35 Dose:  650 mg


Amlodipine Besylate (Norvasc)  5 mg PO DAILY [facility]


   Last Admin: 10/29/18 09:12 Dose:  5 mg


Enoxaparin Sodium (Lovenox)  40 mg SC DAILY [facility]; Protocol


   Last Admin: 10/28/18 08:35 Dose:  40 mg


Folic Acid (Folic Acid)  1 mg PO DAILY [facility]


   Last Admin: 10/29/18 09:13 Dose:  1 mg


Cefazolin Sodium/Dextrose (Ancef Iv 2 Gm Duplex)  2 gm in 50 mls @ 50 mls/hr 

IVPB Q8 [facility]; Protocol


   Last Admin: 10/29/18 09:13 Dose:  50 mls/hr


Levetiracetam (Keppra)  500 mg PO BID [facility]


   Last Admin: 10/29/18 09:12 Dose:  500 mg


Multivitamins/Minerals (Therapeutic-M Tab)  1 tab PO DAILY [facility]


   Last Admin: 10/29/18 09:12 Dose:  1 tab


Thiamine HCl (Vitamin B1 Tab)  100 mg PO DAILY [facility]


   Last Admin: 10/29/18 09:12 Dose:  100 mg











- Labs


Labs: 


                                        





                                 10/27/18 05:40 





                                 10/29/18 04:25 





                                        











PT  12.6 Seconds (9.8-13.1)   10/19/18  23:05    


 


INR  1.1   10/19/18  23:05    


 


APTT  36.2 Seconds (25.6-37.1)   10/19/18  23:05    














- Constitutional


Appears: Non-toxic, No Acute Distress





- Head Exam


Head Exam: ATRAUMATIC, NORMAL INSPECTION, NORMOCEPHALIC





- Eye Exam


Eye Exam: EOMI, Normal appearance, PERRL





- ENT Exam


ENT Exam: Mucous Membranes Moist





- Respiratory Exam


Respiratory Exam: Clear to Ausculation Bilateral, NORMAL BREATHING PATTERN





- Cardiovascular Exam


Cardiovascular Exam: RRR, +S1, +S2





- GI/Abdominal Exam


GI & Abdominal Exam: Soft, Normal Bowel Sounds





- Extremities Exam


Extremities Exam: Normal Inspection


Additional comments: 


R PICC line in place








- Neurological Exam


Neurological Exam: Alert, Awake, Normal Gait, Oriented x3





Assessment and Plan





- Assessment and Plan (Free Text)


Assessment: 


59 yo M with hx of non compliance with meds, Alcohol abuse and seizure, brought 

to the ED with  recurrent witnessed seizures, alcohol withdrawl. on telemetry 

now, no withdrawal symptoms, had bacteremia MSSA, questionable endocarditis, IV 

picc line. Waiting for MARY to decide plan. 





1. Status Epilepticus due to non compliance of medication and most probably to 

Alcohol withdrawal.


-Resolved


-Neurology consult appreciated Dr Mari


-F/u EEG


-Keppra 500mg PO Q12H


-Discontinued Ativan


-Seizure precaution 


-PT recommends skilled PT 





2. Alcohol Withdrawal


-resolved 


-CIWA score 0, Alc level <10


-Discontinued Librium and Ativan


-Folic Acid and Thiamine PO





3. Transaminitis


-MELD score 7


-Hep C ab-reactive, F/u Hep C RNA and genotype


-,  trending down


-Abd us-Diffuse increased liver echogenicity compatible with fatty or other 

liver pathology, no masses or dilated ducts 


-HIV neg


-Non immune Hep B, Hep A negative





4. Bacteremia 


-resolved


-blood cx-staph aureus  


-ID consult appreciated- Dr. Dumas


-Picc line placed, Ancef 2gm @50ml/hr Q8hr Day #5


-Blood cxs normalized


-Echo possible vegetation reported vs sclerotic AV, Moderate AR


-Cardiologist consult appreciated- Dr. Barnes


-Dr. Valentine will be performing MARY, pending rescheduled day


-F/u MARY 


-NPO, and Lovenox hold for night before MARY





5.Hypokalemia


-resolved


-K 4.1





5.HTN


-Norvasc 5mg QD


-monitor BP





6. DVT prophylaxis


-Lovenox 40mg





7.Code Status


-Full

## 2018-10-29 NOTE — CP.PCM.PN
Subjective





- Date & Time of Evaluation


Date of Evaluation: 10/29/18


Time of Evaluation: 09:00





- Subjective


Subjective: 





for MARY


IV rx in progress 





Objective





- Vital Signs/Intake and Output


Vital Signs (last 24 hours): 


                                        











Temp Pulse Resp BP Pulse Ox


 


 98 F   98 H  18   124/66   97 


 


 10/29/18 08:20  10/29/18 09:12  10/29/18 08:20  10/29/18 09:12  10/29/18 08:20








Intake and Output: 


                                        











 10/29/18 10/29/18





 06:59 18:59


 


Intake Total 30 


 


Balance 30 














- Medications


Medications: 


                               Current Medications





Acetaminophen (Tylenol 650 Mg Supp)  650 mg [place] Q4 PRN


   PRN Reason: Fever >100.4 F


   Last Admin: 10/20/18 04:35 Dose:  650 mg


Amlodipine Besylate (Norvasc)  5 mg PO DAILY [facility]


   Last Admin: 10/29/18 09:12 Dose:  5 mg


Enoxaparin Sodium (Lovenox)  40 mg SC DAILY [facility]; Protocol


   Last Admin: 10/28/18 08:35 Dose:  40 mg


Folic Acid (Folic Acid)  1 mg PO DAILY [facility]


   Last Admin: 10/29/18 09:13 Dose:  1 mg


Cefazolin Sodium/Dextrose (Ancef Iv 2 Gm Duplex)  2 gm in 50 mls @ 50 mls/hr 

IVPB Q8 [facility]; Protocol


   Last Admin: 10/29/18 09:13 Dose:  50 mls/hr


Levetiracetam (Keppra)  500 mg PO BID [facility]


   Last Admin: 10/29/18 09:12 Dose:  500 mg


Multivitamins/Minerals (Therapeutic-M Tab)  1 tab PO DAILY [facility]


   Last Admin: 10/29/18 09:12 Dose:  1 tab


Thiamine HCl (Vitamin B1 Tab)  100 mg PO DAILY [facility]


   Last Admin: 10/29/18 09:12 Dose:  100 mg











- Labs


Labs: 


                                        





                                 10/27/18 05:40 





                                 10/29/18 04:25 





                                        











PT  12.6 Seconds (9.8-13.1)   10/19/18  23:05    


 


INR  1.1   10/19/18  23:05    


 


APTT  36.2 Seconds (25.6-37.1)   10/19/18  23:05    














- Constitutional


Appears: Non-toxic, Chronically Ill





- Head Exam


Head Exam: NORMOCEPHALIC





- Eye Exam


Eye Exam: absent: Scleral icterus





- ENT Exam


ENT Exam: Mucous Membranes Dry





- Neck Exam


Neck Exam: absent: Lymphadenopathy





- Respiratory Exam


Respiratory Exam: Decreased Breath Sounds





- Cardiovascular Exam


Cardiovascular Exam: REGULAR RHYTHM





- GI/Abdominal Exam


GI & Abdominal Exam: Distended





- Rectal Exam


Rectal Exam: Deferred





Assessment and Plan


(1) MSSA (methicillin susceptible Staphylococcus aureus) septicemia


Status: Acute   





- Assessment and Plan (Free Text)


Assessment: 





cont iv rx 


await MARY

## 2018-10-30 ENCOUNTER — HOSPITAL ENCOUNTER (OUTPATIENT)
Dept: HOSPITAL 31 - C.CATHLAB | Age: 58
Discharge: HOME | End: 2018-10-30
Payer: COMMERCIAL

## 2018-10-30 VITALS — BODY MASS INDEX: 24.7 KG/M2

## 2018-10-30 DIAGNOSIS — I08.0: Primary | ICD-10-CM

## 2018-10-30 PROCEDURE — B246ZZ4 ULTRASONOGRAPHY OF RIGHT AND LEFT HEART, TRANSESOPHAGEAL: ICD-10-PCS

## 2018-10-30 RX ADMIN — CEFAZOLIN SODIUM SCH MLS/HR: 2 SOLUTION INTRAVENOUS at 01:14

## 2018-10-30 RX ADMIN — MULTIPLE VITAMINS W/ MINERALS TAB SCH TAB: TAB at 17:03

## 2018-10-30 NOTE — CP.PCM.PN
Subjective





- Date & Time of Evaluation


Date of Evaluation: 10/30/18


Time of Evaluation: 08:00





- Subjective


Subjective: 





afebrile





Objective





- Vital Signs/Intake and Output


Vital Signs (last 24 hours): 


                                        











Temp Pulse Resp BP Pulse Ox


 


 98.1 F   53 L  18   135/67   99 


 


 10/30/18 08:08  10/30/18 08:08  10/30/18 08:08  10/30/18 08:08  10/30/18 08:08








Intake and Output: 


                                        











 10/30/18 10/30/18





 06:59 18:59


 


Intake Total 120 


 


Balance 120 














- Medications


Medications: 


                               Current Medications





Acetaminophen (Tylenol 650 Mg Supp)  650 mg [place] Q4 PRN


   PRN Reason: Fever >100.4 F


   Last Admin: 10/20/18 04:35 Dose:  650 mg


Amlodipine Besylate (Norvasc)  5 mg PO DAILY [facility]


   Last Admin: 10/29/18 09:12 Dose:  5 mg


Enoxaparin Sodium (Lovenox)  40 mg SC DAILY [facility]; Protocol


   Last Admin: 10/28/18 08:35 Dose:  40 mg


Folic Acid (Folic Acid)  1 mg PO DAILY [facility]


   Last Admin: 10/29/18 09:13 Dose:  1 mg


Cefazolin Sodium/Dextrose (Ancef Iv 2 Gm Duplex)  2 gm in 50 mls @ 50 mls/hr 

IVPB Q8 [facility]; Protocol


   Last Admin: 10/30/18 01:14 Dose:  50 mls/hr


Levetiracetam (Keppra)  500 mg PO BID [facility]


   Last Admin: 10/29/18 17:35 Dose:  500 mg


Multivitamins/Minerals (Therapeutic-M Tab)  1 tab PO DAILY [facility]


   Last Admin: 10/29/18 09:12 Dose:  1 tab


Thiamine HCl (Vitamin B1 Tab)  100 mg PO DAILY [facility]


   Last Admin: 10/29/18 09:12 Dose:  100 mg











- Labs


Labs: 


                                        





                                 10/27/18 05:40 





                                 10/29/18 04:25 





                                        











PT  12.6 Seconds (9.8-13.1)   10/19/18  23:05    


 


INR  1.1   10/19/18  23:05    


 


APTT  36.2 Seconds (25.6-37.1)   10/19/18  23:05    














- Constitutional


Appears: Non-toxic, Chronically Ill





- Head Exam


Head Exam: NORMOCEPHALIC





- Eye Exam


Eye Exam: PERRL


Pupil Exam: NORMAL ACCOMODATION





- ENT Exam


ENT Exam: Mucous Membranes Dry





- Respiratory Exam


Respiratory Exam: Decreased Breath Sounds





- Cardiovascular Exam


Cardiovascular Exam: REGULAR RHYTHM





- GI/Abdominal Exam


GI & Abdominal Exam: Distended, Soft





Assessment and Plan


(1) MSSA (methicillin susceptible Staphylococcus aureus) septicemia


Status: Acute   





- Assessment and Plan (Free Text)


Assessment: 





IV rx in progress

## 2018-10-30 NOTE — CP.PCM.PN
Subjective





- Date & Time of Evaluation


Date of Evaluation: 10/30/18


Time of Evaluation: 09:00





- Subjective


Subjective: 


No acute overnight events. Feels well, stable gait, alert and oriented x 3. 

Denies fevers, chills, nausea, vomiting, diarrhea, constipation, chest pain, 

SOB. Pt is NPO and scheduled for MARY today at [facility], held lovenox overnight. 








Objective





- Vital Signs/Intake and Output


Vital Signs (last 24 hours): 


                                        











Temp Pulse Resp BP Pulse Ox


 


 98.1 F   53 L  18   135/67   99 


 


 10/30/18 08:08  10/30/18 08:08  10/30/18 08:08  10/30/18 08:08  10/30/18 08:08








Intake and Output: 


                                        











 10/30/18 10/30/18





 06:59 18:59


 


Intake Total 120 


 


Balance 120 














- Medications


Medications: 


                               Current Medications





Acetaminophen (Tylenol 650 Mg Supp)  650 mg [place] Q4 PRN


   PRN Reason: Fever >100.4 F


   Last Admin: 10/20/18 04:35 Dose:  650 mg


Amlodipine Besylate (Norvasc)  5 mg PO DAILY [facility]


   Last Admin: 10/29/18 09:12 Dose:  5 mg


Enoxaparin Sodium (Lovenox)  40 mg SC DAILY [facility]; Protocol


   Last Admin: 10/28/18 08:35 Dose:  40 mg


Folic Acid (Folic Acid)  1 mg PO DAILY [facility]


   Last Admin: 10/29/18 09:13 Dose:  1 mg


Cefazolin Sodium/Dextrose (Ancef Iv 2 Gm Duplex)  2 gm in 50 mls @ 50 mls/hr 

IVPB Q8 [facility]; Protocol


   Last Admin: 10/30/18 01:14 Dose:  50 mls/hr


Levetiracetam (Keppra)  500 mg PO BID [facility]


   Last Admin: 10/29/18 17:35 Dose:  500 mg


Multivitamins/Minerals (Therapeutic-M Tab)  1 tab PO DAILY [facility]


   Last Admin: 10/29/18 09:12 Dose:  1 tab


Thiamine HCl (Vitamin B1 Tab)  100 mg PO DAILY [facility]


   Last Admin: 10/29/18 09:12 Dose:  100 mg











- Labs


Labs: 


                                        





                                 10/27/18 05:40 





                                 10/29/18 04:25 





                                        











PT  12.6 Seconds (9.8-13.1)   10/19/18  23:05    


 


INR  1.1   10/19/18  23:05    


 


APTT  36.2 Seconds (25.6-37.1)   10/19/18  23:05    














- Constitutional


Appears: Non-toxic, No Acute Distress





- Head Exam


Head Exam: ATRAUMATIC, NORMAL INSPECTION, NORMOCEPHALIC





- Eye Exam


Eye Exam: EOMI, Normal appearance





- ENT Exam


ENT Exam: Mucous Membranes Moist





- Respiratory Exam


Respiratory Exam: Clear to Ausculation Bilateral, NORMAL BREATHING PATTERN





- Cardiovascular Exam


Cardiovascular Exam: RRR, +S1, +S2





- GI/Abdominal Exam


GI & Abdominal Exam: Soft, Normal Bowel Sounds





- Extremities Exam


Extremities Exam: Normal Inspection


Additional comments: 


R PICC line in place








- Neurological Exam


Neurological Exam: Alert, Awake, Oriented x3





Assessment and Plan





- Assessment and Plan (Free Text)


Assessment: 


59 yo M with hx of non compliance with meds, Alcohol abuse and seizure, brought 

to the ED with recurrent witnessed seizures, alcohol withdrawl. on telemetry 

now, no withdrawal symptoms, had bacteremia MSSA, questionable endocarditis, IV 

picc line. Waiting for MARY today at [facility] to decide plan. 





1. Status Epilepticus due to non compliance of medication and most probably to 

Alcohol withdrawal.


-Resolved


-Neurology consult appreciated Dr Mari


-F/u EEG


-Keppra 500mg PO Q12H


-Seizure precaution 


-PT eval





2. Alcohol Withdrawal


-resolved 


-CIWA score 0, Alc level <10


-Discontinued Librium and Ativan


-Folic Acid and Thiamine PO





3. Transaminitis


-MELD score 7


-Hep C ab-reactive, F/u Hep C RNA and genotype


-,  yesterday


-Abd us-Diffuse increased liver echogenicity compatible with fatty or other 

liver pathology, no masses or dilated ducts 


-HIV neg


-Non immune Hep B, Hep A negative





4. Bacteremia 


-resolved


-blood cx-staph aureus  


-ID consult appreciated- Dr. Dumas


-Picc line placed, Ancef 2gm @50ml/hr Q8hr Day #6


-Blood cxs normalized day 5


-Echo possible vegetation reported vs sclerotic AV, Moderate AR


-Cardiologist consult appreciated- Dr. Barnes


-Dr. Valentine will be performing MARY today at [facility]


-F/u MARY 


-NPO, and held Lovenox for MARY





5.Hypokalemia


-resolved


-K 4.1





5.HTN


-Norvasc 5mg QD


-monitor BP





6. DVT prophylaxis


-Lovenox 40mg





7.Code Status


-Full

## 2018-10-31 LAB
BASOPHILS # BLD AUTO: 0.2 K/UL (ref 0–0.2)
BASOPHILS NFR BLD: 1.9 % (ref 0–2)
BUN SERPL-MCNC: 12 MG/DL (ref 9–20)
CALCIUM SERPL-MCNC: 8.9 MG/DL (ref 8.4–10.2)
EOSINOPHIL # BLD AUTO: 0.1 K/UL (ref 0–0.7)
EOSINOPHIL NFR BLD: 1.1 % (ref 0–4)
ERYTHROCYTE [DISTWIDTH] IN BLOOD BY AUTOMATED COUNT: 13.2 % (ref 11.5–14.5)
GFR NON-AFRICAN AMERICAN: > 60
HGB BLD-MCNC: 11.7 G/DL (ref 12–18)
LYMPHOCYTES # BLD AUTO: 2.1 K/UL (ref 1–4.3)
LYMPHOCYTES NFR BLD AUTO: 23.1 % (ref 20–40)
MCH RBC QN AUTO: 29.1 PG (ref 27–31)
MCHC RBC AUTO-ENTMCNC: 32 G/DL (ref 33–37)
MCV RBC AUTO: 91 FL (ref 80–94)
MONOCYTES # BLD: 1.1 K/UL (ref 0–0.8)
MONOCYTES NFR BLD: 11.8 % (ref 0–10)
NEUTROPHILS # BLD: 5.6 K/UL (ref 1.8–7)
NEUTROPHILS NFR BLD AUTO: 62.1 % (ref 50–75)
NRBC BLD AUTO-RTO: 0 % (ref 0–0)
PLATELET # BLD: 205 K/UL (ref 130–400)
PMV BLD AUTO: 10.9 FL (ref 7.2–11.7)
RBC # BLD AUTO: 4.03 MIL/UL (ref 4.4–5.9)
WBC # BLD AUTO: 9 K/UL (ref 4.8–10.8)

## 2018-10-31 RX ADMIN — MULTIPLE VITAMINS W/ MINERALS TAB SCH TAB: TAB at 08:35

## 2018-10-31 RX ADMIN — ENOXAPARIN SODIUM SCH MG: 40 INJECTION SUBCUTANEOUS at 11:18

## 2018-10-31 NOTE — CP.PCM.PN
<Martha Lundekah - Last Filed: 10/31/18 13:43>





Subjective





- Date & Time of Evaluation


Date of Evaluation: 10/31/18


Time of Evaluation: 09:00





- Subjective


Subjective: 


No acute overnight events. Feels well, stable gait, alert and oriented x 3. 

Denies fevers, chills, nausea, vomiting, diarrhea, constipation, chest pain, 

SOB. Pt went for his MARY yesterday was found to have vegetation on AV valve w. 

bacterial endocarditis, being treated with Ancef via Picc, pt has a history of 

IV drug abuse and will not be able to be D/c with Picc due to risk. 





Objective





- Vital Signs/Intake and Output


Vital Signs (last 24 hours): 


                                        











Temp Pulse Resp BP Pulse Ox


 


 98.0 F   79   18   105/66   98 


 


 10/31/18 12:06  10/31/18 12:06  10/31/18 12:06  10/31/18 12:06  10/31/18 12:06











- Medications


Medications: 


                               Current Medications





Acetaminophen (Tylenol 650 Mg Supp)  650 mg [place] Q4 PRN


   PRN Reason: Fever >100.4 F


   Last Admin: 10/20/18 04:35 Dose:  650 mg


Amlodipine Besylate (Norvasc)  5 mg PO DAILY [facility]


   Last Admin: 10/31/18 08:34 Dose:  5 mg


Enoxaparin Sodium (Lovenox)  40 mg SC DAILY [facility]; Protocol


   Last Admin: 10/31/18 11:18 Dose:  40 mg


Folic Acid (Folic Acid)  1 mg PO DAILY [facility]


   Last Admin: 10/31/18 08:35 Dose:  1 mg


Cefazolin Sodium 2 gm/ Sodium (Chloride)  100 mls @ 100 mls/hr IVPB Q8 [facility]; 

Protocol


   Last Admin: 10/31/18 08:36 Dose:  100 mls/hr


Levetiracetam (Keppra)  500 mg PO BID [facility]


   Last Admin: 10/31/18 08:35 Dose:  500 mg


Multivitamins/Minerals (Therapeutic-M Tab)  1 tab PO DAILY [facility]


   Last Admin: 10/31/18 08:35 Dose:  1 tab


Thiamine HCl (Vitamin B1 Tab)  100 mg PO DAILY [facility]


   Last Admin: 10/31/18 08:35 Dose:  100 mg











- Labs


Labs: 


                                        





                                 10/31/18 04:14 





                                 10/31/18 04:14 





                                        











PT  12.6 Seconds (9.8-13.1)   10/19/18  23:05    


 


INR  1.1   10/19/18  23:05    


 


APTT  36.2 Seconds (25.6-37.1)   10/19/18  23:05    














- Constitutional


Appears: Non-toxic, No Acute Distress





- Head Exam


Head Exam: ATRAUMATIC, NORMAL INSPECTION, NORMOCEPHALIC





- Eye Exam


Eye Exam: EOMI, Normal appearance





- ENT Exam


ENT Exam: Mucous Membranes Moist





- Respiratory Exam


Respiratory Exam: Clear to Ausculation Bilateral, NORMAL BREATHING PATTERN





- Cardiovascular Exam


Cardiovascular Exam: RRR, +S1, +S2





- GI/Abdominal Exam


GI & Abdominal Exam: Soft, Normal Bowel Sounds





- Extremities Exam


Additional comments: 


R PICC line in place








- Neurological Exam


Neurological Exam: Alert, Awake, Oriented x3





Assessment and Plan





- Assessment and Plan (Free Text)


Assessment: 


57 yo M with hx of non compliance with meds, Alcohol abuse and seizure, brought 

to the ED with recurrent witnessed seizures, alcohol withdrawl. on telemetry now

, no withdrawal symptoms, Pt had MARY done at [facility] was shown to have a 

vegetation has bacteremia MSSA endocarditis, IV picc line. 





1. Status Epilepticus due to non compliance of medication and most probably to 

Alcohol withdrawal.


-Resolved


-Neurology consult appreciated Dr Mari


-EEG


-Keppra 500mg PO Q12H


-Seizure precaution 


-PT eval





2. Alcohol Withdrawal


-resolved 


-CIWA score 0, Alc level <10


-Discontinued Librium and Ativan


-Folic Acid and Thiamine PO





3. Transaminitis


-MELD score 7


-Hep C ab-reactive


-Hepatitis C- Genotype 1A


-F/u Hep C RNA PCR- recollected due to no suitable specimen 


-,  10/29


-Abd us-Diffuse increased liver echogenicity compatible with fatty or other 

liver pathology, no masses or dilated ducts 


-HIV neg


-Non immune Hep B, Hep A negative





4. Endocarditis Bacteremia 


-MARY + for vegetation on AV 


-blood cx-staph aureus  


-Picc line in place  


-ID consult appreciated- Dr. Dumas- pt cant be d/c with picc due to risk from 

hx of IV drug abuse


-Ancef 2gm @100ml/hr Q8hr Day #7


-Blood cxs normalized day 5


-Cardiologist consult appreciated- Dr. Barnes


-F/u ID recommendations





5.Hypokalemia


-resolved


-K 4.0





5.HTN


-Norvasc 5mg QD


-monitor BP





6. DVT prophylaxis


-Lovenox 40mg





7.Code Status


-Full





<Jazmín Campos - Last Filed: 10/31/18 17:41>





Objective





- Vital Signs/Intake and Output


Vital Signs (last 24 hours): 


                                        











Temp Pulse Resp BP Pulse Ox


 


 97.4 F L  97 H  20   128/73   96 


 


 10/31/18 15:42  10/31/18 15:42  10/31/18 15:42  10/31/18 15:42  10/31/18 15:42








Intake and Output: 


                                        











 10/31/18 10/31/18





 06:59 18:59


 


Intake Total  750


 


Balance  750














- Medications


Medications: 


                               Current Medications





Acetaminophen (Tylenol 650 Mg Supp)  650 mg [place] Q4 PRN


   PRN Reason: Fever >100.4 F


   Last Admin: 10/20/18 04:35 Dose:  650 mg


Amlodipine Besylate (Norvasc)  5 mg PO DAILY [facility]


   Last Admin: 10/31/18 08:34 Dose:  5 mg


Enoxaparin Sodium (Lovenox)  40 mg SC DAILY [facility]; Protocol


   Last Admin: 10/31/18 11:18 Dose:  40 mg


Folic Acid (Folic Acid)  1 mg PO DAILY [facility]


   Last Admin: 10/31/18 08:35 Dose:  1 mg


Cefazolin Sodium 2 gm/ Sodium (Chloride)  100 mls @ 100 mls/hr IVPB Q8 [facility]; 

Protocol


   Last Admin: 10/31/18 16:16 Dose:  100 mls/hr


Levetiracetam (Keppra)  500 mg PO BID [facility]


   Last Admin: 10/31/18 16:14 Dose:  500 mg


Multivitamins/Minerals (Therapeutic-M Tab)  1 tab PO DAILY [facility]


   Last Admin: 10/31/18 08:35 Dose:  1 tab


Thiamine HCl (Vitamin B1 Tab)  100 mg PO DAILY [facility]


   Last Admin: 10/31/18 08:35 Dose:  100 mg











- Labs


Labs: 


                                        





                                 10/31/18 04:14 





                                 10/31/18 04:14 





                                        











PT  12.6 Seconds (9.8-13.1)   10/19/18  23:05    


 


INR  1.1   10/19/18  23:05    


 


APTT  36.2 Seconds (25.6-37.1)   10/19/18  23:05    














Attending/Attestation





- Attestation


I have personally seen and examined this patient.: Yes


I have fully participated in the care of the patient.: Yes


I have reviewed all pertinent clinical information, including history, physical 

exam and plan: Yes


Notes (Text): 





10/31/18 17:41


Seen examined and discussed with resident. 


Agree with findings and plan as above.





Patient unable to be discharge with PICC due to history of IVDA. 


Continue with IV RX for endocarditis.

## 2018-10-31 NOTE — CP.PCM.DIS
Provider





- Provider


Date of Admission: 


10/20/18 09:43





Attending physician: 


Bulmaro Araujo








[facility]





- Lab Results


Lab Results: 


                                  Micro Results





10/24/18 18:30   Blood-Venous   Blood Culture - Final


                            NO GROWTH AFTER 5 DAYS


10/24/18 18:30   Blood-Venous   Gram Stain - Final


                            TEST NOT PERFORMED


10/24/18 18:00   Blood-Venous   Blood Culture - Final


                            NO GROWTH AFTER 5 DAYS


10/24/18 18:00   Blood-Venous   Gram Stain - Final


                            TEST NOT PERFORMED


10/21/18 19:14   Blood-Venous   Blood Culture - Final


                            NO GROWTH AFTER 5 DAYS


10/21/18 19:14   Blood-Venous   Gram Stain - Final


                            TEST NOT PERFORMED


10/21/18 19:06   Blood-Venous   Blood Culture - Final


                            NO GROWTH AFTER 5 DAYS


10/21/18 19:06   Blood-Venous   Gram Stain - Final


                            TEST NOT PERFORMED


10/25/18 17:15   Nose   MRSA Culture (Admit) - Final


                            MRSA NOT DETECTED


10/20/18 00:45   Blood-Venous   Blood Culture - Final


                            NO GROWTH AFTER 5 DAYS


10/20/18 00:45   Blood-Venous   Gram Stain - Final


                            TEST NOT PERFORMED


10/20/18 02:00   Blood-Venous   S.aureus & Coag-Neg Staph PNA FISH - Final


10/20/18 02:00   Blood-Venous   Blood Culture - Final


                            Staphylococcus Aureus


10/20/18 02:00   Blood-Venous   Gram Stain - Final


10/20/18 07:15   Naris   MRSA Culture (Admit) - Final


                            MRSA NOT DETECTED


10/20/18 09:00   Urine,Evangelista   Urine Culture - Final


                            No Growth (<1,000 CFU/ML)





                             Most Recent Lab Values











WBC  9.0 K/uL (4.8-10.8)   10/31/18  04:14    


 


RBC  4.03 Mil/uL (4.40-5.90)  L  10/31/18  04:14    


 


Hgb  11.7 g/dL (12.0-18.0)  L  10/31/18  04:14    


 


Hct  36.7 % (35.0-51.0)   10/31/18  04:14    


 


MCV  91.0 fl (80.0-94.0)   10/31/18  04:14    


 


MCH  29.1 pg (27.0-31.0)   10/31/18  04:14    


 


MCHC  32.0 g/dL (33.0-37.0)  L  10/31/18  04:14    


 


RDW  13.2 % (11.5-14.5)   10/31/18  04:14    


 


Plt Count  205 K/uL (130-400)   10/31/18  04:14    


 


MPV  10.9 fl (7.2-11.7)   10/31/18  04:14    


 


Neut % (Auto)  62.1 % (50.0-75.0)   10/31/18  04:14    


 


Lymph % (Auto)  23.1 % (20.0-40.0)   10/31/18  04:14    


 


Mono % (Auto)  11.8 % (0.0-10.0)  H  10/31/18  04:14    


 


Eos % (Auto)  1.1 % (0.0-4.0)   10/31/18  04:14    


 


Baso % (Auto)  1.9 % (0.0-2.0)   10/31/18  04:14    


 


Neut # (Auto)  5.6 K/uL (1.8-7.0)   10/31/18  04:14    


 


Lymph # (Auto)  2.1 K/uL (1.0-4.3)   10/31/18  04:14    


 


Mono # (Auto)  1.1 K/uL (0.0-0.8)  H  10/31/18  04:14    


 


Eos # (Auto)  0.1 K/uL (0.0-0.7)   10/31/18  04:14    


 


Baso # (Auto)  0.2 K/uL (0.0-0.2)   10/31/18  04:14    


 


PT  12.6 Seconds (9.8-13.1)   10/19/18  23:05    


 


INR  1.1   10/19/18  23:05    


 


APTT  36.2 Seconds (25.6-37.1)   10/19/18  23:05    


 


pCO2  32 mm/Hg (35-45)  L  10/19/18  23:28    


 


pO2  63 mm/Hg (30-55)  H  10/21/18  06:38    


 


HCO3  21.7 mmol/L (21-28)   10/19/18  23:28    


 


ABG pH  7.40  (7.35-7.45)   10/19/18  23:28    


 


ABG Total CO2  20.8 mmol/L (22-28)  L  10/19/18  23:28    


 


ABG O2 Saturation  99.3 % (95-98)  H  10/19/18  23:28    


 


ABG Base Excess  -4.0 mmol/L (-2.0-3.0)  L  10/19/18  23:28    


 


Akshat Test  Yes   10/19/18  23:28    


 


ABG Potassium  4.3 mmol/L (3.6-5.2)   10/19/18  23:28    


 


VBG pH  7.46  (7.32-7.43)  H  10/21/18  06:38    


 


VBG pCO2  35 mmHg (40-60)  L  10/21/18  06:38    


 


VBG HCO3  25.9 mmol/L  10/21/18  06:38    


 


VBG Total CO2  26.0 mmol/L (22-28)   10/21/18  06:38    


 


VBG O2 Sat (Calc)  95.2 % (40-65)  H  10/21/18  06:38    


 


VBG Base Excess  1.4 mmol/L (0.0-2.0)   10/21/18  06:38    


 


VBG Potassium  3.4 mmol/L (3.6-5.2)  L  10/21/18  06:38    


 


A-a O2 Difference  492.0 mm/Hg  10/19/18  23:28    


 


Sodium  132.0 mmol/L (132-148)   10/21/18  06:38    


 


Chloride  103.0 mmol/L ()   10/21/18  06:38    


 


Glucose  119 mg/dL ()  H  10/21/18  06:38    


 


Lactate  1.4 mmol/L (0.7-2.1)   10/21/18  06:38    


 


FiO2  21.0 %  10/21/18  06:38    


 


Crit Value Called To  Dr caridad quintero   10/19/18  23:28    


 


Crit Value Called By  Alfred   10/19/18  23:28    


 


Crit Value Read Back  Y   10/19/18  23:28    


 


Blood Gas Notified Time  4874   10/19/18  23:28    


 


Sodium  139 mmol/l (132-148)   10/31/18  04:14    


 


Potassium  4.0 MMOL/L (3.6-5.0)   10/31/18  04:14    


 


Chloride  103 mmol/L ()   10/31/18  04:14    


 


Carbon Dioxide  28 mmol/L (22-30)   10/31/18  04:14    


 


Anion Gap  12  (10-20)   10/31/18  04:14    


 


BUN  12 mg/dl (9-20)   10/31/18  04:14    


 


Creatinine  0.7 mg/dl (0.8-1.5)  L  10/31/18  04:14    


 


Est GFR ( Amer)  > 60   10/31/18  04:14    


 


Est GFR (Non-Af Amer)  > 60   10/31/18  04:14    


 


POC Glucose (mg/dL)  175 mg/dL ()  H  10/19/18  22:35    


 


Random Glucose  89 mg/dL ()   10/31/18  04:14    


 


Hemoglobin A1c  5.4 % (4.2-6.5)   10/20/18  08:30    


 


Calcium  8.9 mg/dL (8.4-10.2)   10/31/18  04:14    


 


Phosphorus  5.2 mg/dl (2.5-4.5)  H  10/19/18  23:05    


 


Magnesium  1.6 MG/DL (1.6-2.3)   10/25/18  05:16    


 


Total Bilirubin  0.8 mg/dl (0.2-1.3)   10/29/18  04:25    


 


AST  262 U/L (17-59)  H  10/29/18  04:25    


 


ALT  242 U/L (21-72)  H  10/29/18  04:25    


 


Alkaline Phosphatase  62 U/L ()   10/29/18  04:25    


 


Ammonia  35 umo/L (16-60)   10/19/18  23:54    


 


Total Protein  7.9 G/DL (6.3-8.2)   10/29/18  04:25    


 


Albumin  3.6 g/dL (3.5-5.0)   10/29/18  04:25    


 


Globulin  4.3 gm/dL (2.2-3.9)  H  10/29/18  04:25    


 


Albumin/Globulin Ratio  0.8  (1.0-2.1)  L  10/29/18  04:25    


 


Procalcitonin  0.14 NG/ML (0.19-0.49)  L  10/25/18  05:16    


 


Arterial Blood Potassium  4.3 mmol/L (3.6-5.2)   10/19/18  23:28    


 


Venous Blood Potassium  3.4 mmol/L (3.6-5.2)  L  10/21/18  06:38    


 


Urine Color  Yellow  (YELLOW)   10/20/18  00:51    


 


Urine Clarity  Clear  (Clear)   10/20/18  00:51    


 


Urine pH  6.0  (5.0-8.0)   10/20/18  00:51    


 


Ur Specific Gravity  1.016  (1.003-1.030)   10/20/18  00:51    


 


Urine Protein  100 mg/dL (NEGATIVE)   10/20/18  00:51    


 


Urine Glucose (UA)  50 mg/dL (Normal)   10/20/18  00:51    


 


Urine Ketones  Trace mg/dL (NEGATIVE)   10/20/18  00:51    


 


Urine Blood  Moderate  (NEGATIVE)   10/20/18  00:51    


 


Urine Nitrate  Negative  (NEGATIVE)   10/20/18  00:51    


 


Urine Bilirubin  Negative  (NEGATIVE)   10/20/18  00:51    


 


Urine Urobilinogen  0.2-1.0 mg/dL (0.2-1.0)   10/20/18  00:51    


 


Ur Leukocyte Esterase  Neg Kelly/uL (Negative)   10/20/18  00:51    


 


Urine RBC (Auto)  1 /hpf (0-3)   10/20/18  00:51    


 


Urine Microscopic WBC  2 /hpf (0-5)   10/20/18  00:51    


 


Vancomycin Trough  < 5.0 ug/mL (5.0-10.0)  L  10/23/18  07:42    


 


Random Vancomycin  < 5.0 ug/mL  10/22/18  09:04    


 


Urine Opiates Screen  Negative  (NEGATIVE)   10/19/18  23:54    


 


Urine Methadone Screen  Negative  (NEGATIVE)   10/19/18  23:54    


 


Ur Barbiturates Screen  Negative  (NEGATIVE)   10/19/18  23:54    


 


Phenytoin  < 3.0 ug/ML (10-20)  L  10/19/18  23:05    


 


Valproic Acid  < 10.0 ug/mL (50.0-100.0)  L  10/19/18  23:05    


 


Ur Phencyclidine Scrn  Negative  (NEGATIVE)   10/19/18  23:54    


 


Ur Amphetamines Screen  Negative  (NEGATIVE)   10/19/18  23:54    


 


U Benzodiazepines Scrn  Negative  (NEGATIVE)   10/19/18  23:54    


 


U Oth Cocaine Metabols  Negative  (NEGATIVE)   10/19/18  23:54    


 


U Cannabinoids Screen  Negative  (NEGATIVE)   10/19/18  23:54    


 


Alcohol, Quantitative  < 10 mg/dl (0-10)   10/19/18  23:05    


 


Hepatitis A IgM Ab  Negative  (NEGATIVE)   10/24/18  09:09    


 


Hepatitis A Ab Total  Nonreactive  (Nonreactive)   10/24/18  09:09    


 


Hep Bs Antigen  Negative  (NEGATIVE)   10/24/18  09:09    


 


Hep Bs Antibody  Negative  (NEGATIVE)   10/24/18  09:25    


 


Hep Bs Antibody, Quant  <5 mIU/mL (>or=10)  L  10/24/18  09:09    


 


Hepatitis C Antibody  Reactive  (NEGATIVE)   10/24/18  09:09    


 


HIV 1&2 Ag/Ab, 4th Gen  Nonreactive  (Nonreactive)   10/24/18  09:09    


 


Blood Type  A NEGATIVE   10/19/18  23:05    


 


Antibody Screen  Negative   10/19/18  23:05    


 


BBK History Checked  No verified bt   10/19/18  23:05    














Discharge Exam





- Head Exam


Head Exam: NORMOCEPHALIC





Discharge Plan





- Follow Up Plan


Condition: GUARDED


Disposition: HOME/ ROUTINE


Instructions:  MRSA (DC), Seizures, Adult (DC), Alcohol Withdrawal (DC)


Referrals: 


[facility] [Outside]


Jaison Dumas MD [Staff Provider] - 


Grant Mari MD [Medical Doctor] -

## 2018-11-01 VITALS — OXYGEN SATURATION: 98 % | RESPIRATION RATE: 16 BRPM

## 2018-11-01 LAB
ALBUMIN SERPL-MCNC: 3.3 G/DL (ref 3.5–5)
ALBUMIN/GLOB SERPL: 0.8 {RATIO} (ref 1–2.1)
ALT SERPL-CCNC: 154 U/L (ref 21–72)
AST SERPL-CCNC: 158 U/L (ref 17–59)
BUN SERPL-MCNC: 11 MG/DL (ref 9–20)
CALCIUM SERPL-MCNC: 8.7 MG/DL (ref 8.4–10.2)
GFR NON-AFRICAN AMERICAN: > 60

## 2018-11-01 RX ADMIN — ENOXAPARIN SODIUM SCH MG: 40 INJECTION SUBCUTANEOUS at 08:31

## 2018-11-01 RX ADMIN — MULTIPLE VITAMINS W/ MINERALS TAB SCH TAB: TAB at 08:30

## 2018-11-01 NOTE — PCM.EEG
Electroencephalogram Report





- Electroencephalogram Report


Procedure Date: 10/23/18


Condition of Recording: Awake, Drowsy


Interpretation: 





 There is a 10 hz background that is reactive symmetric and attenuates with eye 

opening.  There was a normal amount of frontal beta noted bilaterally. No sleep 

was captured. 


Impression: 





This is a normal awake and drowsy EEG. 


Conclusion: Normal EEG (Awake, Drowsy, and Asleep)

## 2018-11-01 NOTE — CP.PCM.PN
Addendum entered and electronically signed by Vidhya Hanson MD  11/01/18 17:52: 


Patient seen and examined bedside .  All chart and clinical data reviewed . Case

discussed with resident . Agree with assessment and plan 


59 y/o male with PMH alcoholism brought to ED for evaluation for witnessed 

seizure episode and AMS . Patient initially admitted for ETOH withdrawals , Post

ictal , seizure prevention .


Blood work up sent since patient was febrile. 1 blood cx reported positive for 

MSSA .


MARY showed aortic valve vegetation 


At present on Ancef IV for endocarditis and bacteremia ( patient has history of 

IV drug abuse ). ID on consult and PICC in place . Plan to continue 4-6 weeks of

IV antibiotics . Repeat blood cx with no growth so far 


Mental status AAOx3 


Continue Keppra for seizure prevention 


DX 


Endocarditis 


MSSA bacteremia


hypertension 


Alcoholism 


Seizure 


Transaminitis


Hepatitis Bs Antibody  positive 











Original Note:








Subjective





- Date & Time of Evaluation


Date of Evaluation: 11/01/18


Time of Evaluation: 11:20





- Subjective


Subjective: 





Patient admitted on 10/20/18 for seizures and alcohol withdrawal, patient was 

found to have vegetations on aortic valve on Echo done 10/22 due to bacteremia, 

patient also has history ofIV drug abuse, patient is receiving ancef 2g via PICC

Q8h day 8th today. No acute overnight events reported, NAD, patient is alert and

oriented x 3, denies chest pain, SOB, cough, N/V. 








Objective





- Vital Signs/Intake and Output


Vital Signs (last 24 hours): 


                                        











Temp Pulse Resp BP Pulse Ox


 


 98.1 F   59 L  18   132/62   97 


 


 11/01/18 08:24  11/01/18 08:30  11/01/18 08:24  11/01/18 08:30  11/01/18 08:24











- Medications


Medications: 


                               Current Medications





Acetaminophen (Tylenol 650 Mg Supp)  650 mg [place] Q4 PRN


   PRN Reason: Fever >100.4 F


   Last Admin: 10/20/18 04:35 Dose:  650 mg


Amlodipine Besylate (Norvasc)  5 mg PO DAILY [facility]


   Last Admin: 11/01/18 08:30 Dose:  5 mg


Enoxaparin Sodium (Lovenox)  40 mg SC DAILY [facility]; Protocol


   Last Admin: 11/01/18 08:31 Dose:  40 mg


Folic Acid (Folic Acid)  1 mg PO DAILY [facility]


   Last Admin: 11/01/18 08:30 Dose:  1 mg


Cefazolin Sodium 2 gm/ Sodium (Chloride)  100 mls @ 100 mls/hr IVPB Q8 [facility]; 

Protocol


   Last Admin: 11/01/18 08:31 Dose:  100 mls/hr


Levetiracetam (Keppra)  500 mg PO BID [facility]


   Last Admin: 11/01/18 08:30 Dose:  500 mg


Multivitamins/Minerals (Therapeutic-M Tab)  1 tab PO DAILY [facility]


   Last Admin: 11/01/18 08:30 Dose:  1 tab


Thiamine HCl (Vitamin B1 Tab)  100 mg PO DAILY [facility]


   Last Admin: 11/01/18 08:30 Dose:  100 mg











- Labs


Labs: 


                                        





                                 10/31/18 04:14 





                                 11/01/18 05:25 





                                        











PT  12.6 Seconds (9.8-13.1)   10/19/18  23:05    


 


INR  1.1   10/19/18  23:05    


 


APTT  36.2 Seconds (25.6-37.1)   10/19/18  23:05    














- Constitutional


Appears: No Acute Distress





- Head Exam


Head Exam: ATRAUMATIC, NORMAL INSPECTION, NORMOCEPHALIC





- Eye Exam


Eye Exam: EOMI, PERRL





- ENT Exam


ENT Exam: Mucous Membranes Moist





- Neck Exam


Neck Exam: Full ROM





- Respiratory Exam


Respiratory Exam: Clear to Ausculation Bilateral.  absent: Chest Wall 

Tenderness, Rales, Wheezes





- Cardiovascular Exam


Cardiovascular Exam: RRR, +S1, +S2





- GI/Abdominal Exam


GI & Abdominal Exam: Soft, Normal Bowel Sounds.  absent: Tenderness, Mass





- Extremities Exam


Extremities Exam: Normal Inspection


Additional comments: 





Right PICC line in place





- Neurological Exam


Neurological Exam: Alert, Awake, Oriented x3 (No hand tremors)





- Psychiatric Exam


Psychiatric exam: Normal Mood





- Skin


Skin Exam: Dry, Normal Color, Warm





Assessment and Plan





- Assessment and Plan (Free Text)


Assessment: 





Assessment: 


59 yo M with hx of non compliance with meds, Alcohol abuse and seizure, brought 

to the ED with recurrent witnessed seizures, alcohol withdrawl. on telemetry 

now, no withdrawal symptoms, Pt had MARY done at [facility] was shown to have a 

vegetation has bacteremia MSSA endocarditis. 





1. Status Epilepticus due to non compliance of medication and most probably to 

Alcohol withdrawal.


-Resolved


-Neurology consult Dr Mari, recomendations appreciatted.


-EEG


-Keppra 500mg PO Q12H


-Seizure precaution 


-PT eval





2. Alcohol Withdrawal


-resolved 


-CIWA score 0, Alc level <10


-Discontinued was Librium and Ativan


-Folic Acid and Thiamine PO





3. Transaminitis


-MELD score 7


-Hep C ab-reactive


-Hepatitis C- Genotype 1A


-Hep C RNA PCR- pending results 


-,  11/1


-Abd us-Diffuse increased liver echogenicity compatible with fatty or other 

liver pathology, no masses or dilated ducts 


-HIV neg


-Non immune Hep B, Hep A negative





4. Endocarditis Bacteremia 


-MARY + for vegetation on AV 


-blood cx-staph aureus  


-Picc line in place  


-ID consult- Dr. Dumas- pt cant be d/c with picc due to risk from hx of IV drug

abuse


-Ancef 2g Q8hr Day #8


-Blood cxs normalized by day 5


-Cardiologist consult Dr. Barnes, recommendations appreciated


-F/u ID recommendations





5.Hypokalemia


-resolved


-K 4.0





5.HTN


-Norvasc 5mg QD


-monitor BP





6. DVT prophylaxis


-Lovenox 40mg





7.Code Status


-Full

## 2018-11-01 NOTE — PQF
PROVIDER RESPONSE TEXT:



Patient has Bacteremia and endocarditis , no septicemia



REVIEWER QUERY TEXT:



Conflicting Documentation Clarification



A single mention or documentation of multiple diagnoses for the same clinical presentation appears in
 the record.

There is documentation of both Bacteremia and Septicemia MSSA. ( Different ICD-10 codes) Please ebneezer
fy if the patient has Bacteremia only  or Septicemia ( with or without Bacteremia )



Please document if the condition is:

-- Confirmed and current

-- Confirmed, treated and resolved

-- Ruled out

-- Other, please specify



The patient's Clinical Indicators include:

Admitted with Status Epilepticus due to non compliance of medication and most probably due to Alcohol
 withdrawal. + Fever



TEMP 97.6, 100.9, 100.9, 103.4, 101.2, 99.8, 99.2, 98.8

, 125, 102, 97, 85, 85, 82, 71

/111, 160/102, 149/95, 140/87, 127.78, 117.71

R; 19/20. 18.17



WBC 5.4,  Lactate >20



Rx: IVAB



ID: [id number] Septicemia





Query created by: Keyona Bojorquez on 10/31/2018 11:19 AM





Electronically signed by:  Vidhya Hanson 11/1/2018 6:02 PM

## 2018-11-02 LAB
ALBUMIN SERPL-MCNC: 3.6 G/DL (ref 3.5–5)
ALBUMIN/GLOB SERPL: 0.8 {RATIO} (ref 1–2.1)
ALT SERPL-CCNC: 141 U/L (ref 21–72)
AST SERPL-CCNC: 135 U/L (ref 17–59)
BUN SERPL-MCNC: 13 MG/DL (ref 9–20)
CALCIUM SERPL-MCNC: 9 MG/DL (ref 8.4–10.2)
ERYTHROCYTE [DISTWIDTH] IN BLOOD BY AUTOMATED COUNT: 12.9 % (ref 11.5–14.5)
GFR NON-AFRICAN AMERICAN: > 60
HGB BLD-MCNC: 12.2 G/DL (ref 12–18)
MCH RBC QN AUTO: 29.2 PG (ref 27–31)
MCHC RBC AUTO-ENTMCNC: 32.1 G/DL (ref 33–37)
MCV RBC AUTO: 91.1 FL (ref 80–94)
PLATELET # BLD: 237 K/UL (ref 130–400)
RBC # BLD AUTO: 4.17 MIL/UL (ref 4.4–5.9)
WBC # BLD AUTO: 6.3 K/UL (ref 4.8–10.8)

## 2018-11-02 RX ADMIN — MULTIPLE VITAMINS W/ MINERALS TAB SCH TAB: TAB at 08:46

## 2018-11-02 RX ADMIN — ENOXAPARIN SODIUM SCH MG: 40 INJECTION SUBCUTANEOUS at 08:46

## 2018-11-02 NOTE — CP.PCM.PN
Addendum entered by Anna Otero MD  11/02/18 16:57: 





Correction : IV Cefazolin  and not Nafcillin





Original Note:








<Yahir Franklin - Last Filed: 11/02/18 09:45>





Subjective





- Date & Time of Evaluation


Date of Evaluation: 11/02/18


Time of Evaluation: 09:00





- Subjective


Subjective: 





Patient admitted on 10/20/18 initially for seizures and alcohol withdrawal, 

patient was febrile and found with bacteremia on BC and Echo was done on 10/22 

showed vegetations on aortic valve, patient also has history of IV drug abuse, 

patient is receiving ancef 2g via PICC Q8h on day 12th today (antibx was started

on 10/22). Patient seen this morning at bedside having breakfast in NAD, good 

appetite, patient is alert and oriented x 3, no hand tremors, denies chest pain,

SOB, cough, N/V/D.





Objective





- Vital Signs/Intake and Output


Vital Signs (last 24 hours): 


                                        











Temp Pulse Resp BP Pulse Ox


 


 97.7 F   54 L  18   131/65   99 


 


 11/02/18 07:58  11/02/18 07:58  11/02/18 07:58  11/02/18 07:58  11/02/18 07:58











- Medications


Medications: 


                               Current Medications





Acetaminophen (Tylenol 650 Mg Supp)  650 mg [place] Q4 PRN


   PRN Reason: Fever >100.4 F


   Last Admin: 10/20/18 04:35 Dose:  650 mg


Amlodipine Besylate (Norvasc)  5 mg PO DAILY [facility]


   Last Admin: 11/02/18 08:47 Dose:  5 mg


Enoxaparin Sodium (Lovenox)  40 mg SC DAILY [facility]; Protocol


   Last Admin: 11/02/18 08:46 Dose:  40 mg


Folic Acid (Folic Acid)  1 mg PO DAILY [facility]


   Last Admin: 11/02/18 08:47 Dose:  1 mg


Cefazolin Sodium 2 gm/ Sodium (Chloride)  100 mls @ 100 mls/hr IVPB Q8 [facility]; 

Protocol


   Last Admin: 11/02/18 08:45 Dose:  100 mls/hr


Levetiracetam (Keppra)  500 mg PO BID [facility]


   Last Admin: 11/02/18 08:47 Dose:  500 mg


Multivitamins/Minerals (Therapeutic-M Tab)  1 tab PO DAILY [facility]


   Last Admin: 11/02/18 08:46 Dose:  1 tab


Thiamine HCl (Vitamin B1 Tab)  100 mg PO DAILY [facility]


   Last Admin: 11/02/18 08:47 Dose:  100 mg











- Labs


Labs: 


                                        





                                 11/02/18 04:25 





                                 11/02/18 04:25 





                                        











PT  12.6 Seconds (9.8-13.1)   10/19/18  23:05    


 


INR  1.1   10/19/18  23:05    


 


APTT  36.2 Seconds (25.6-37.1)   10/19/18  23:05    














- Additional Findings


Additional findings: 





- Constitutional


Appears: No Acute Distress





- Head Exam


Head Exam: ATRAUMATIC, NORMAL INSPECTION, NORMOCEPHALIC





- Eye Exam


Eye Exam: EOMI, PERRL





- ENT Exam


ENT Exam: Mucous Membranes Moist





- Neck Exam


Neck Exam: Full ROM





- Respiratory Exam


Respiratory Exam: Clear to Ausculation Bilateral.  absent: Chest Wall 

Tenderness, Rales, Wheezes





- Cardiovascular Exam


Cardiovascular Exam: RRR, +S1, +S2





- GI/Abdominal Exam


GI & Abdominal Exam: Soft, Normal Bowel Sounds.  absent: Tenderness, Mass





- Extremities Exam


Extremities Exam: Normal Inspection


Additional comments: 





Right PICC line in place





- Neurological Exam


Neurological Exam: Alert, Awake, Oriented x3, no hand tremors.





- Psychiatric Exam


Psychiatric exam: Normal Mood





- Skin


Skin Exam: Dry, Normal Color, Warm





Assessment and Plan





- Assessment and Plan (Free Text)


Assessment: 





59 yo M with hx of non compliance with meds, Alcohol abuse and seizure, brought 

to the ED with recurrent witnessed seizures, alcohol withdrawl and AMS. Patient 

was also febrile on admission, and BC showed bacteremia + with MSSA, MARY showed 

aortic Valve vegetation and Ancef was started 2g IV Q8h via PICC on 10/22. 

Patient at present has no withdrawal symptoms. Patient stable, will transfer to 

[facility] unit to continue management.





Plan: 





1. Status Epilepticus due to non compliance of medication and most probably to 

Alcohol withdrawal.


-Resolved


-Neurology consult Dr Mari, recomendations appreciatted.


-EEG:Conclusion: Normal EEG (Awake, Drowsy, and Asleep)


-Keppra 500mg PO Q12H


-Seizure precaution 


-PT eval and Tx





2. Alcohol Withdrawal


-resolved 


-CIWA score 0, Alc level <10


-Discontinued Librium and Ativan


-Folic Acid and Thiamine PO





3. Transaminitis


-MELD score 7


-Hep C ab-reactive


-Hepatitis C- Genotype 1A


-Hep C RNA PCR- pending results 


-,  11/2, LFT are trending down


-Abd us-Diffuse increased liver echogenicity compatible with fatty or other 

liver pathology, no masses or dilated ducts 


-HIV neg


-Non immune Hep B, Hep A negative





4. Endocarditis Bacteremia 


-MARY + for vegetation on AV 


-blood cx-staph aureus, repeat BC negative 


-Picc line in place on Right arm 


-ID consult- Dr. Dumas- pt cant be d/c home with picc due to risk from hx of IV

drug abuse


-Ancef 2g Q8hr Day #12


-Blood cxs normalized by day 5


-Cardiologist consult Dr. Barnes, recommendations appreciated


-F/u ID recommendations





5.Hypokalemia


-resolved


-K 4.0





5.HTN


-Norvasc 5mg QD


-monitor BP





6. DVT prophylaxis


-Lovenox 40mg





7.Code Status


-Full








<Anna Otero - Last Filed: 11/02/18 16:56>





Objective





- Vital Signs/Intake and Output


Vital Signs (last 24 hours): 


                                        











Temp Pulse Resp BP Pulse Ox


 


 98.0 F   70   18   118/73   98 


 


 11/02/18 15:52  11/02/18 15:52  11/02/18 15:52  11/02/18 15:52  11/02/18 15:52











- Medications


Medications: 


                               Current Medications





Acetaminophen (Tylenol 650 Mg Supp)  650 mg [place] Q4 PRN


   PRN Reason: Fever >100.4 F


   Last Admin: 10/20/18 04:35 Dose:  650 mg


Amlodipine Besylate (Norvasc)  5 mg PO DAILY [facility]


   Last Admin: 11/02/18 08:47 Dose:  5 mg


Enoxaparin Sodium (Lovenox)  40 mg SC DAILY [facility]; Protocol


   Last Admin: 11/02/18 08:46 Dose:  40 mg


Folic Acid (Folic Acid)  1 mg PO DAILY [facility]


   Last Admin: 11/02/18 08:47 Dose:  1 mg


Cefazolin Sodium 2 gm/ Sodium (Chloride)  100 mls @ 100 mls/hr IVPB Q8 [facility]; 

Protocol


   Last Admin: 11/02/18 16:43 Dose:  100 mls/hr


Levetiracetam (Keppra)  500 mg PO BID [facility]


   Last Admin: 11/02/18 16:43 Dose:  500 mg


Multivitamins/Minerals (Therapeutic-M Tab)  1 tab PO DAILY [facility]


   Last Admin: 11/02/18 08:46 Dose:  1 tab


Thiamine HCl (Vitamin B1 Tab)  100 mg PO DAILY [facility]


   Last Admin: 11/02/18 08:47 Dose:  100 mg











- Labs


Labs: 


                                        





                                 11/02/18 04:25 





                                 11/02/18 04:25 





                                        











PT  12.6 Seconds (9.8-13.1)   10/19/18  23:05    


 


INR  1.1   10/19/18  23:05    


 


APTT  36.2 Seconds (25.6-37.1)   10/19/18  23:05    














Attending/Attestation





- Attestation


I have personally seen and examined this patient.: Yes


I have fully participated in the care of the patient.: Yes


I have reviewed all pertinent clinical information, including history, physical 

exam and plan: Yes


Notes (Text): 





Pt would need at least 4-6 wks of IV antibiotics for MSSA Endocarditis


- cont IV Nafcillin


- awaiting Medicaid reinstate,emt so pt can be d/c to [facility] for IV abx

## 2018-11-02 NOTE — CP.PCM.PN
Subjective





- Date & Time of Evaluation


Date of Evaluation: 11/02/18


Time of Evaluation: 08:00





- Subjective


Subjective: 





AFEB OIN IV RX


RX RENEWED 





Objective





- Vital Signs/Intake and Output


Vital Signs (last 24 hours): 


                                        











Temp Pulse Resp BP Pulse Ox


 


 98.2 F   71   18   119/65   96 


 


 11/02/18 11:52  11/02/18 11:52  11/02/18 11:52  11/02/18 11:52  11/02/18 11:52











- Medications


Medications: 


                               Current Medications





Acetaminophen (Tylenol 650 Mg Supp)  650 mg [place] Q4 PRN


   PRN Reason: Fever >100.4 F


   Last Admin: 10/20/18 04:35 Dose:  650 mg


Amlodipine Besylate (Norvasc)  5 mg PO DAILY [facility]


   Last Admin: 11/02/18 08:47 Dose:  5 mg


Enoxaparin Sodium (Lovenox)  40 mg SC DAILY [facility]; Protocol


   Last Admin: 11/02/18 08:46 Dose:  40 mg


Folic Acid (Folic Acid)  1 mg PO DAILY [facility]


   Last Admin: 11/02/18 08:47 Dose:  1 mg


Cefazolin Sodium 2 gm/ Sodium (Chloride)  100 mls @ 100 mls/hr IVPB Q8 [facility]; 

Protocol


   Last Admin: 11/02/18 08:45 Dose:  100 mls/hr


Levetiracetam (Keppra)  500 mg PO BID [facility]


   Last Admin: 11/02/18 08:47 Dose:  500 mg


Multivitamins/Minerals (Therapeutic-M Tab)  1 tab PO DAILY [facility]


   Last Admin: 11/02/18 08:46 Dose:  1 tab


Thiamine HCl (Vitamin B1 Tab)  100 mg PO DAILY [facility]


   Last Admin: 11/02/18 08:47 Dose:  100 mg











- Labs


Labs: 


                                        





                                 11/02/18 04:25 





                                 11/02/18 04:25 





                                        











PT  12.6 Seconds (9.8-13.1)   10/19/18  23:05    


 


INR  1.1   10/19/18  23:05    


 


APTT  36.2 Seconds (25.6-37.1)   10/19/18  23:05    














- Constitutional


Appears: Well





- Head Exam


Head Exam: ATRAUMATIC, NORMAL INSPECTION, NORMOCEPHALIC





- Eye Exam


Eye Exam: EOMI, Normal appearance, PERRL


Pupil Exam: NORMAL ACCOMODATION, PERRL





- ENT Exam


ENT Exam: Mucous Membranes Moist, Normal Exam





- Neck Exam


Neck Exam: Full ROM, Normal Inspection.  absent: Lymphadenopathy





- Respiratory Exam


Respiratory Exam: Clear to Ausculation Bilateral, NORMAL BREATHING PATTERN





- Cardiovascular Exam


Cardiovascular Exam: REGULAR RHYTHM, +S1, +S2.  absent: Murmur





- GI/Abdominal Exam


GI & Abdominal Exam: Soft, Normal Bowel Sounds.  absent: Tenderness





- Rectal Exam


Rectal Exam: NORMAL INSPECTION





- Extremities Exam


Extremities Exam: Full ROM, Normal Capillary Refill, Normal Inspection.  absent:

Joint Swelling, Pedal Edema





- Back Exam


Back Exam: NORMAL INSPECTION





- Neurological Exam


Neurological Exam: Alert, Awake, CN II-XII Intact, Normal Gait, Oriented x3





- Psychiatric Exam


Psychiatric exam: Normal Affect, Normal Mood





- Skin


Skin Exam: Dry, Intact, Normal Color, Warm





Assessment and Plan


(1) MSSA (methicillin susceptible Staphylococcus aureus) septicemia


Status: Acute

## 2018-11-03 RX ADMIN — MULTIPLE VITAMINS W/ MINERALS TAB SCH TAB: TAB at 08:22

## 2018-11-03 RX ADMIN — ENOXAPARIN SODIUM SCH MG: 40 INJECTION SUBCUTANEOUS at 08:22

## 2018-11-04 RX ADMIN — ENOXAPARIN SODIUM SCH MG: 40 INJECTION SUBCUTANEOUS at 09:32

## 2018-11-04 RX ADMIN — MULTIPLE VITAMINS W/ MINERALS TAB SCH TAB: TAB at 09:32

## 2018-11-04 NOTE — CP.PCM.PN
<Jose Alejandrorangel KapoorYahir - Last Filed: 11/04/18 13:38>





Subjective





- Date & Time of Evaluation


Date of Evaluation: 11/04/18


Time of Evaluation: 09:40





- Subjective


Subjective: 





Patient admitted on 10/20/18 initially for seizures and alcohol withdrawal, 

patient was febrile and found with bacteremia on BC and Echo was done on 10/22 

showed vegetations on aortic valve, patient also has history of IV drug abuse, 

patient is receiving ancef 2g via PICC Q8h on day 14th today (antibx was started

on 10/22). Patient seen this morning at bedside having breakfast in NAD,  

reports good appetite, patient is alert and oriented x 3, no hand tremors, seen 

steady in his gait, denies chest pain, SOB, cough, N/V/D.








Objective





- Vital Signs/Intake and Output


Vital Signs (last 24 hours): 


                                        











Temp Pulse Resp BP Pulse Ox


 


 97.9 F   52 L  19   103/64   100 


 


 11/04/18 07:55  11/04/18 09:33  11/04/18 07:55  11/04/18 09:33  11/04/18 07:55








Intake and Output: 


                                        











 11/04/18 11/04/18





 06:59 18:59


 


Intake Total  


 


Balance  














- Medications


Medications: 


                               Current Medications





Amlodipine Besylate (Norvasc)  5 mg PO DAILY [facility]


   Last Admin: 11/04/18 09:33 Dose:  Not Given


Enoxaparin Sodium (Lovenox)  40 mg SC DAILY [facility]; Protocol


   Last Admin: 11/04/18 09:32 Dose:  40 mg


Folic Acid (Folic Acid)  1 mg PO DAILY [facility]


   Last Admin: 11/04/18 09:32 Dose:  1 mg


Cefazolin Sodium 2 gm/ Sodium (Chloride)  100 mls @ 100 mls/hr IVPB Q8 [facility]; 

Protocol


   Last Admin: 11/04/18 01:14 EDT Dose:  100 mls/hr


Levetiracetam (Keppra)  500 mg PO BID [facility]


   Last Admin: 11/04/18 09:32 Dose:  500 mg


Multivitamins/Minerals (Therapeutic-M Tab)  1 tab PO DAILY [facility]


   Last Admin: 11/04/18 09:32 Dose:  1 tab


Thiamine HCl (Vitamin B1 Tab)  100 mg PO DAILY [facility]


   Last Admin: 11/04/18 09:32 Dose:  100 mg











- Labs


Labs: 


                                        





                                 11/02/18 04:25 





                                 11/02/18 04:25 





                                        











PT  12.6 Seconds (9.8-13.1)   10/19/18  23:05    


 


INR  1.1   10/19/18  23:05    


 


APTT  36.2 Seconds (25.6-37.1)   10/19/18  23:05    














- Additional Findings


Additional findings: 








- Constitutional


Appears: No Acute Distress





- Head Exam


Head Exam: ATRAUMATIC, NORMOCEPHALIC





- Eye Exam


Eye Exam: EOMI, PERRL





- ENT Exam


ENT Exam: Mucous Membranes Moist





- Neck Exam


Neck Exam: Full ROM





- Respiratory Exam


Respiratory Exam: Clear to Ausculation Bilateral.  absent: Chest Wall 

Tenderness, Rales, Wheezes





- Cardiovascular Exam


Cardiovascular Exam: RRR, +S1, +S2





- GI/Abdominal Exam


GI & Abdominal Exam: Soft, Normal Bowel Sounds.  absent: Tenderness, Mass





- Extremities Exam


Extremities Exam: Normal Inspection


Additional comments: 





Right PICC line in place





- Neurological Exam


Neurological Exam: Alert, Awake, Oriented x3, no hand tremors, steady gait 

noted.





- Psychiatric Exam


Psychiatric exam: Normal Mood





- Skin


Skin Exam: Dry, Normal Color, Warm





Assessment and Plan





- Assessment and Plan (Free Text)


Assessment: 





59 yo M with hx of non compliance with meds, Alcohol abuse and seizure, brought 

to the ED with recurrent witnessed seizures, alcohol withdrawl and AMS. Patient 

was also febrile on admission, and BC showed bacteremia + with MSSA, MARY showed 

aortic Valve vegetation and Ancef was started 2g IV Q8h via PICC on 10/22. 

Patient at present has no withdrawal symptoms. Patient is stable in medsurg unit

at this time.





Plan: 





1. Status Epilepticus due to non compliance of medication and most probably to 

Alcohol withdrawal.


-Resolved


-Neurology consult Dr Mari, recomendations appreciatted.


-EEG:Conclusion: Normal EEG (Awake, Drowsy, and Asleep)


-Keppra 500mg PO Q12H


-Seizure precaution 


-PT eval and Tx





2. Alcohol Withdrawal


-resolved 


-CIWA score 0, Alc level <10


-Discontinued Librium and Ativan


-Folic Acid and Thiamine PO





3. Transaminitis


-MELD score 7


-Hep C ab-reactive


-Hepatitis C- Genotype 1A


-Hep C RNA PCR- pending results 


-,  11/2, LFT are trending down


-Abd us-Diffuse increased liver echogenicity compatible with fatty or other 

liver pathology, no masses or dilated ducts 


-HIV neg


-Non immune Hep B, Hep A negative





4. Endocarditis Bacteremia 


-MARY + for vegetation on AV 


-blood cx-staph aureus, repeat BC negative 


-Picc line in place on Right arm 


-ID consult- Dr. Dumas- pt cant be d/c home with picc due to risk from hx of IV

drug abuse


-Ancef 2g Q8hr Day #14


-Blood cxs normalized by day 5


-Cardiologist consult Dr. Barnes, recommendations appreciated


-F/u ID recommendations





5.Hypokalemia


-resolved





5.HTN


-Norvasc 5mg QD


-monitor BP





6. DVT prophylaxis


-Lovenox 40mg





7.Code Status


-Full











<Anna Otero - Last Filed: 11/04/18 18:42>





Objective





- Vital Signs/Intake and Output


Vital Signs (last 24 hours): 


                                        











Temp Pulse Resp BP Pulse Ox


 


 98.4 F   52 L  20   114/62   100 


 


 11/04/18 17:20  11/04/18 17:20  11/04/18 17:20  11/04/18 17:20  11/04/18 17:20








Intake and Output: 


                                        











 11/04/18 11/04/18





 06:59 18:59


 


Intake Total  


 


Balance  














- Medications


Medications: 


                               Current Medications





Amlodipine Besylate (Norvasc)  5 mg PO DAILY [facility]


   Last Admin: 11/04/18 09:33 Dose:  Not Given


Enoxaparin Sodium (Lovenox)  40 mg SC DAILY [facility]; Protocol


   Last Admin: 11/04/18 09:32 Dose:  40 mg


Folic Acid (Folic Acid)  1 mg PO DAILY [facility]


   Last Admin: 11/04/18 09:32 Dose:  1 mg


Cefazolin Sodium 2 gm/ Sodium (Chloride)  100 mls @ 100 mls/hr IVPB Q8 [facility]; 

Protocol


   Last Admin: 11/04/18 17:29 Dose:  100 mls/hr


Levetiracetam (Keppra)  500 mg PO BID [facility]


   Last Admin: 11/04/18 17:29 Dose:  500 mg


Multivitamins/Minerals (Therapeutic-M Tab)  1 tab PO DAILY [facility]


   Last Admin: 11/04/18 09:32 Dose:  1 tab


Thiamine HCl (Vitamin B1 Tab)  100 mg PO DAILY [facility]


   Last Admin: 11/04/18 09:32 Dose:  100 mg











- Labs


Labs: 


                                        





                                 11/02/18 04:25 





                                 11/02/18 04:25 





                                        











PT  12.6 Seconds (9.8-13.1)   10/19/18  23:05    


 


INR  1.1   10/19/18  23:05    


 


APTT  36.2 Seconds (25.6-37.1)   10/19/18  23:05    














Attending/Attestation





- Attestation


I have personally seen and examined this patient.: Yes


I have fully participated in the care of the patient.: Yes


I have reviewed all pertinent clinical information, including history, physical 

exam and plan: Yes

## 2018-11-04 NOTE — CP.PCM.PN
Subjective





- Date & Time of Evaluation


Date of Evaluation: 11/04/18


Time of Evaluation: 07:00





- Subjective


Subjective: 





afeb 


iv rx in progress





Objective





- Vital Signs/Intake and Output


Vital Signs (last 24 hours): 


                                        











Temp Pulse Resp BP Pulse Ox


 


 97.9 F   52 L  19   103/64   100 


 


 11/04/18 09:00  11/04/18 09:33  11/04/18 09:00  11/04/18 09:33  11/04/18 09:00








Intake and Output: 


                                        











 11/04/18 11/04/18





 06:59 18:59


 


Intake Total  


 


Balance  














- Medications


Medications: 


                               Current Medications





Amlodipine Besylate (Norvasc)  5 mg PO DAILY [facility]


   Last Admin: 11/04/18 09:33 Dose:  Not Given


Enoxaparin Sodium (Lovenox)  40 mg SC DAILY [facility]; Protocol


   Last Admin: 11/04/18 09:32 Dose:  40 mg


Folic Acid (Folic Acid)  1 mg PO DAILY [facility]


   Last Admin: 11/04/18 09:32 Dose:  1 mg


Cefazolin Sodium 2 gm/ Sodium (Chloride)  100 mls @ 100 mls/hr IVPB Q8 [facility]; 

Protocol


   Last Admin: 11/04/18 11:43 Dose:  100 mls/hr


Levetiracetam (Keppra)  500 mg PO BID [facility]


   Last Admin: 11/04/18 09:32 Dose:  500 mg


Multivitamins/Minerals (Therapeutic-M Tab)  1 tab PO DAILY [facility]


   Last Admin: 11/04/18 09:32 Dose:  1 tab


Thiamine HCl (Vitamin B1 Tab)  100 mg PO DAILY [facility]


   Last Admin: 11/04/18 09:32 Dose:  100 mg











- Labs


Labs: 


                                        





                                 11/02/18 04:25 





                                 11/02/18 04:25 





                                        











PT  12.6 Seconds (9.8-13.1)   10/19/18  23:05    


 


INR  1.1   10/19/18  23:05    


 


APTT  36.2 Seconds (25.6-37.1)   10/19/18  23:05    














Assessment and Plan


(1) MSSA (methicillin susceptible Staphylococcus aureus) septicemia


Status: Acute

## 2018-11-05 RX ADMIN — MULTIPLE VITAMINS W/ MINERALS TAB SCH TAB: TAB at 09:44

## 2018-11-05 RX ADMIN — ENOXAPARIN SODIUM SCH MG: 40 INJECTION SUBCUTANEOUS at 09:45

## 2018-11-05 NOTE — CP.PCM.PN
<Jose Alejandro KapoorYahir - Last Filed: 11/05/18 10:13>





Subjective





- Date & Time of Evaluation


Date of Evaluation: 11/05/18


Time of Evaluation: 08:35





- Subjective


Subjective: 





Patient admitted on 10/20/18 initially for seizures and alcohol withdrawal, 

patient was febrile and found with bacteremia on BC and Echo was done on 10/22 

showed vegetations on aortic valve, patient also has history of IV drug abuse, 

patient is receiving ancef 2g via PICC Q8h on day 15th today. Patient seen this 

morning at bedside having breakfast in NAD, good appetite, patient is alert and 

oriented x 3, no hand tremors, seen steady in his gait, denies chest pain, SOB, 

cough, N/V/D.





Objective





- Vital Signs/Intake and Output


Vital Signs (last 24 hours): 


                                        











Temp Pulse Resp BP Pulse Ox


 


 97.9 F   47 L  19   120/68   97 


 


 11/05/18 08:03  11/05/18 08:03  11/05/18 08:03  11/05/18 08:03  11/05/18 08:03











- Medications


Medications: 


                               Current Medications





Amlodipine Besylate (Norvasc)  5 mg PO DAILY [facility]


   Last Admin: 11/04/18 09:33 Dose:  Not Given


Enoxaparin Sodium (Lovenox)  40 mg SC DAILY [facility]; Protocol


   Last Admin: 11/04/18 09:32 Dose:  40 mg


Folic Acid (Folic Acid)  1 mg PO DAILY [facility]


   Last Admin: 11/04/18 09:32 Dose:  1 mg


Cefazolin Sodium 2 gm/ Sodium (Chloride)  100 mls @ 100 mls/hr IVPB Q8 [facility]; 

Protocol


   Last Admin: 11/05/18 01:53 Dose:  100 mls/hr


Levetiracetam (Keppra)  500 mg PO BID [facility]


   Last Admin: 11/04/18 17:29 Dose:  500 mg


Multivitamins/Minerals (Therapeutic-M Tab)  1 tab PO DAILY [facility]


   Last Admin: 11/04/18 09:32 Dose:  1 tab


Thiamine HCl (Vitamin B1 Tab)  100 mg PO DAILY [facility]


   Last Admin: 11/04/18 09:32 Dose:  100 mg











- Labs


Labs: 


                                        





                                 11/02/18 04:25 





                                 11/02/18 04:25 





                                        











PT  12.6 Seconds (9.8-13.1)   10/19/18  23:05    


 


INR  1.1   10/19/18  23:05    


 


APTT  36.2 Seconds (25.6-37.1)   10/19/18  23:05    














- Constitutional


Appears: No Acute Distress





- Head Exam


Head Exam: ATRAUMATIC, NORMOCEPHALIC





- Eye Exam


Eye Exam: EOMI





- ENT Exam


ENT Exam: Mucous Membranes Moist





- Neck Exam


Neck Exam: Full ROM





- Respiratory Exam


Respiratory Exam: Clear to Ausculation Bilateral.  absent: Wheezes





- Cardiovascular Exam


Cardiovascular Exam: RRR, +S1, +S2





- GI/Abdominal Exam


GI & Abdominal Exam: Soft, Normal Bowel Sounds.  absent: Tenderness, Mass





- Neurological Exam


Neurological Exam: Alert, Awake, Oriented x3





- Psychiatric Exam


Psychiatric exam: Normal Affect, Normal Mood





- Skin


Skin Exam: Dry, Normal Color, Warm





Assessment and Plan





- Assessment and Plan (Free Text)


Assessment: 





59 yo M with hx of non compliance with meds, Alcohol abuse and seizure, brought 

to the ED with recurrent witnessed seizures, alcohol withdrawl and AMS. Patient 

was also febrile on admission, and BC showed bacteremia + with MSSA, MARY showed 

aortic Valve vegetation and Ancef was started 2g IV Q8h via PICC on 10/22, Today

day #15. Patient is stable in medsurg unit at this time.





Plan: 





1. Status Epilepticus due to non compliance of medication and most probably to 

Alcohol withdrawal.


-Resolved


-Neurology consult Dr Mari, recomendations appreciatted.


-EEG:Conclusion: Normal EEG (Awake, Drowsy, and Asleep)


-Keppra 500mg PO Q12H


-Seizure precaution 


-PT eval and Tx





2. Alcohol Withdrawal


-resolved 


-CIWA score 0, Alc level <10


-Discontinued Librium and Ativan


-Folic Acid and Thiamine PO





3. Transaminitis


-MELD score 7


-Hep C ab-reactive


-Hepatitis C- Genotype 1A


-Hep C RNA PCR- pending results 


-,  11/2, LFT are trending down


-Abd us-Diffuse increased liver echogenicity compatible with fatty or other 

liver pathology, no masses or dilated ducts 


-HIV neg


-Non immune Hep B, Hep A negative





4. Endocarditis Bacteremia 


-MARY + for vegetation on AV 


-blood cx-staph aureus, repeat BC negative 


-Picc line in place on Right arm 


-ID consult- Dr. Mangia- pt cant be d/c home with picc due to risk from hx of IV

drug abuse


-Ancef 2g Q8hr Day #15


-Blood cxs normalized by day 5


-Cardiologist consult Dr. Barnes, recommendations appreciated


-F/u ID recommendations





5.Hypokalemia


-resolved





5.HTN


-Norvasc 5mg QD


-monitor BP





6. DVT prophylaxis


-Lovenox 40mg





7.Code Status


-Full





<Jazmín Campos PAM - Last Filed: 11/05/18 16:10>





Objective





- Vital Signs/Intake and Output


Vital Signs (last 24 hours): 


                                        











Temp Pulse Resp BP Pulse Ox


 


 97.9 F   47 L  19   120/68   97 


 


 11/05/18 09:00  11/05/18 09:00  11/05/18 09:00  11/05/18 09:00  11/05/18 09:00











- Medications


Medications: 


                               Current Medications





Amlodipine Besylate (Norvasc)  5 mg PO DAILY [facility]


   Last Admin: 11/05/18 09:45 Dose:  Not Given


Enoxaparin Sodium (Lovenox)  40 mg SC DAILY [facility]; Protocol


   Last Admin: 11/05/18 09:45 Dose:  40 mg


Folic Acid (Folic Acid)  1 mg PO DAILY [facility]


   Last Admin: 11/05/18 09:44 Dose:  1 mg


Cefazolin Sodium 2 gm/ Sodium (Chloride)  100 mls @ 100 mls/hr IVPB Q8 [facility]; 

Protocol


   Last Admin: 11/05/18 09:44 Dose:  100 mls/hr


Levetiracetam (Keppra)  500 mg PO BID [facility]


   Last Admin: 11/05/18 09:44 Dose:  500 mg


Multivitamins/Minerals (Therapeutic-M Tab)  1 tab PO DAILY [facility]


   Last Admin: 11/05/18 09:44 Dose:  1 tab


Thiamine HCl (Vitamin B1 Tab)  100 mg PO DAILY [facility]


   Last Admin: 11/05/18 09:44 Dose:  100 mg











- Labs


Labs: 


                                        





                                 11/02/18 04:25 





                                 11/02/18 04:25 





                                        











PT  12.6 Seconds (9.8-13.1)   10/19/18  23:05    


 


INR  1.1   10/19/18  23:05    


 


APTT  36.2 Seconds (25.6-37.1)   10/19/18  23:05    














Attending/Attestation





- Attestation


I have personally seen and examined this patient.: Yes


I have fully participated in the care of the patient.: Yes


I have reviewed all pertinent clinical information, including history, physical 

exam and plan: Yes


Notes (Text): 





11/05/18 16:09


agree with findings and plan as above. 


cont iv abx.

## 2018-11-06 LAB
BUN SERPL-MCNC: 12 MG/DL (ref 9–20)
CALCIUM SERPL-MCNC: 8.7 MG/DL (ref 8.4–10.2)
GFR NON-AFRICAN AMERICAN: > 60

## 2018-11-06 RX ADMIN — MULTIPLE VITAMINS W/ MINERALS TAB SCH TAB: TAB at 09:40

## 2018-11-06 RX ADMIN — ENOXAPARIN SODIUM SCH MG: 40 INJECTION SUBCUTANEOUS at 09:39

## 2018-11-06 NOTE — CP.PCM.PN
<Jose Alejandro KapoorYahir - Last Filed: 11/06/18 09:39>





Subjective





- Date & Time of Evaluation


Date of Evaluation: 11/06/18


Time of Evaluation: 08:10





- Subjective


Subjective: 





Patient admitted on 10/20/18 initially for seizures and alcohol withdrawal, 

patient was febrile and found with bacteremia on BC and Echo was done on 10/22 

showed vegetations on aortic valve, patient also has history of IV drug abuse, 

patient is receiving ancef 2g via PICC Q8h on day 16th today. Patient seen this 

AM at bedside having breakfast, NAD, patient is alert and oriented x 3, denies 

chest pain, SOB, cough, N/V/D.





Objective





- Vital Signs/Intake and Output


Vital Signs (last 24 hours): 


                                        











Temp Pulse Resp BP Pulse Ox


 


 97.5 F L  65   19   108/64   97 


 


 11/06/18 08:00  11/06/18 08:00  11/06/18 08:00  11/06/18 08:00  11/06/18 08:00











- Medications


Medications: 


                               Current Medications





Amlodipine Besylate (Norvasc)  5 mg PO DAILY [facility]


   Last Admin: 11/05/18 09:45 Dose:  Not Given


Enoxaparin Sodium (Lovenox)  40 mg SC DAILY [facility]; Protocol


   Last Admin: 11/05/18 09:45 Dose:  40 mg


Folic Acid (Folic Acid)  1 mg PO DAILY [facility]


   Last Admin: 11/05/18 09:44 Dose:  1 mg


Cefazolin Sodium 2 gm/ Sodium (Chloride)  100 mls @ 100 mls/hr IVPB Q8 [facility]; 

Protocol


   Last Admin: 11/06/18 00:12 Dose:  100 mls/hr


Levetiracetam (Keppra)  500 mg PO BID [facility]


   Last Admin: 11/05/18 17:14 Dose:  500 mg


Multivitamins/Minerals (Therapeutic-M Tab)  1 tab PO DAILY [facility]


   Last Admin: 11/05/18 09:44 Dose:  1 tab


Thiamine HCl (Vitamin B1 Tab)  100 mg PO DAILY [facility]


   Last Admin: 11/05/18 09:44 Dose:  100 mg











- Labs


Labs: 


                                        





                                 11/02/18 04:25 





                                 11/06/18 05:30 





                                        











PT  12.6 Seconds (9.8-13.1)   10/19/18  23:05    


 


INR  1.1   10/19/18  23:05    


 


APTT  36.2 Seconds (25.6-37.1)   10/19/18  23:05    














- Constitutional


Appears: No Acute Distress





- Head Exam


Head Exam: ATRAUMATIC, NORMOCEPHALIC





- Eye Exam


Eye Exam: EOMI, PERRL





- ENT Exam


ENT Exam: Mucous Membranes Moist





- Neck Exam


Neck Exam: Full ROM





- Respiratory Exam


Respiratory Exam: Clear to Ausculation Bilateral





- Cardiovascular Exam


Cardiovascular Exam: RRR, +S1, +S2





- GI/Abdominal Exam


GI & Abdominal Exam: Soft, Normal Bowel Sounds.  absent: Tenderness





- Extremities Exam


Extremities Exam: absent: Pedal Edema





- Neurological Exam


Neurological Exam: Oriented x3





- Psychiatric Exam


Psychiatric exam: Normal Affect, Normal Mood





- Skin


Skin Exam: Normal Color, Warm





Assessment and Plan





- Assessment and Plan (Free Text)


Assessment: 





59 yo M with hx of non compliance with meds, Alcohol abuse and seizure, brought 

to the ED with recurrent witnessed seizures, alcohol withdrawl and AMS. Patient 

was also febrile on admission, and BC showed bacteremia + with MSSA, MARY showed 

aortic Valve vegetation and Ancef was started 2g IV Q8h via PICC on 10/22, Today

day #16. Patient is stable in medsurg unit at this time.





Plan: 





1. Endocarditis Bacteremia 


-MARY + for vegetation on AV 


-blood cx-staph aureus, repeat BC negative 


-Picc line in place on Right arm 


-ID consult- Dr. Dumas- pt cant be d/c home with picc due to risk from hx of IV

drug abuse, as per ID 6 weeks Rx.


-Ancef 2g Q8hr Day #16


-Blood cxs normalized by day 5


-Cardiologist consult Dr. Barnes, recommendations appreciated


-F/u ID recommendations





2. Transaminitis


-MELD score 7


-Hep C ab-reactive


-Hepatitis C- Genotype 1A


-Hep C RNA PCR- pending results 


-,  11/2, LFT are trending down


-Abd us-Diffuse increased liver echogenicity compatible with fatty or other 

liver pathology, no masses or dilated ducts 


-HIV neg


-Non immune Hep B, Hep A negative





3. Status Epilepticus due to non compliance of medication and most probably to 

Alcohol withdrawal.


-Resolved


-Neurology consult Dr Mari, recomendations appreciatted.


-EEG:Conclusion: Normal EEG (Awake, Drowsy, and Asleep)


-Keppra 500mg PO Q12H


-Seizure precaution 


-PT eval and Tx





4. Alcohol Withdrawal


-resolved 


-CIWA score 0, Alc level <10


-Discontinued Librium and Ativan


-Folic Acid and Thiamine PO





5.HTN


-Norvasc 5mg QD


-monitor BP





6.Hypokalemia


-resolved





7. DVT prophylaxis


-Lovenox 40mg





Code Status


-Full





<Jazmín Campos - Last Filed: 11/06/18 16:09>





Objective





- Vital Signs/Intake and Output


Vital Signs (last 24 hours): 


                                        











Temp Pulse Resp BP Pulse Ox


 


 97.5 F L  65   19   108/64   97 


 


 11/06/18 08:00  11/06/18 09:40  11/06/18 08:00  11/06/18 09:40  11/06/18 08:00











- Medications


Medications: 


                               Current Medications





Amlodipine Besylate (Norvasc)  5 mg PO DAILY [facility]


   Last Admin: 11/06/18 09:40 Dose:  5 mg


Enoxaparin Sodium (Lovenox)  40 mg SC DAILY [facility]; Protocol


   Last Admin: 11/06/18 09:39 Dose:  40 mg


Folic Acid (Folic Acid)  1 mg PO DAILY [facility]


   Last Admin: 11/06/18 09:40 Dose:  1 mg


Cefazolin Sodium 2 gm/ Sodium (Chloride)  100 mls @ 100 mls/hr IVPB Q8 [facility]; 

Protocol


   Last Admin: 11/06/18 09:46 Dose:  100 mls/hr


Levetiracetam (Keppra)  500 mg PO BID [facility]


   Last Admin: 11/06/18 09:41 Dose:  500 mg


Multivitamins/Minerals (Therapeutic-M Tab)  1 tab PO DAILY [facility]


   Last Admin: 11/06/18 09:40 Dose:  1 tab


Thiamine HCl (Vitamin B1 Tab)  100 mg PO DAILY [facility]


   Last Admin: 11/06/18 09:41 Dose:  100 mg











- Labs


Labs: 


                                        





                                 11/02/18 04:25 





                                 11/06/18 05:30 





                                        











PT  12.6 Seconds (9.8-13.1)   10/19/18  23:05    


 


INR  1.1   10/19/18  23:05    


 


APTT  36.2 Seconds (25.6-37.1)   10/19/18  23:05    














Attending/Attestation





- Attestation


I have personally seen and examined this patient.: Yes


I have fully participated in the care of the patient.: Yes


I have reviewed all pertinent clinical information, including history, physical 

exam and plan: Yes

## 2018-11-07 RX ADMIN — MULTIPLE VITAMINS W/ MINERALS TAB SCH TAB: TAB at 09:36

## 2018-11-07 RX ADMIN — ENOXAPARIN SODIUM SCH MG: 40 INJECTION SUBCUTANEOUS at 09:35

## 2018-11-07 NOTE — CP.PCM.PN
Subjective





- Date & Time of Evaluation


Date of Evaluation: 11/07/18


Time of Evaluation: 09:00





- Subjective


Subjective: 





no new complaints





Objective





- Vital Signs/Intake and Output


Vital Signs (last 24 hours): 


                                        











Temp Pulse Resp BP Pulse Ox


 


 98.1 F   52 L  20   128/64   100 


 


 11/07/18 09:00  11/07/18 08:23  11/07/18 08:23  11/07/18 08:23  11/07/18 08:23











- Medications


Medications: 


                               Current Medications





Amlodipine Besylate (Norvasc)  5 mg PO DAILY [facility]


   Last Admin: 11/07/18 09:36 Dose:  Not Given


Enoxaparin Sodium (Lovenox)  40 mg SC DAILY [facility]; Protocol


   Last Admin: 11/07/18 09:35 Dose:  40 mg


Folic Acid (Folic Acid)  1 mg PO DAILY [facility]


   Last Admin: 11/07/18 09:35 Dose:  1 mg


Cefazolin Sodium 2 gm/ Sodium (Chloride)  100 mls @ 100 mls/hr IVPB Q8 [facility]; 

Protocol


   Last Admin: 11/07/18 09:35 Dose:  100 mls/hr


Levetiracetam (Keppra)  500 mg PO BID [facility]


   Last Admin: 11/07/18 09:35 Dose:  500 mg


Multivitamins/Minerals (Therapeutic-M Tab)  1 tab PO DAILY [facility]


   Last Admin: 11/07/18 09:36 Dose:  1 tab


Thiamine HCl (Vitamin B1 Tab)  100 mg PO DAILY [facility]


   Last Admin: 11/07/18 09:37 Dose:  100 mg











- Labs


Labs: 


                                        





                                 11/02/18 04:25 





                                 11/06/18 05:30 





                                        











PT  12.6 Seconds (9.8-13.1)   10/19/18  23:05    


 


INR  1.1   10/19/18  23:05    


 


APTT  36.2 Seconds (25.6-37.1)   10/19/18  23:05    














- Constitutional


Appears: Well





- Head Exam


Head Exam: ATRAUMATIC, NORMAL INSPECTION, NORMOCEPHALIC





- Eye Exam


Eye Exam: EOMI, Normal appearance, PERRL


Pupil Exam: NORMAL ACCOMODATION, PERRL





- ENT Exam


ENT Exam: Mucous Membranes Moist, Normal Exam





- Neck Exam


Neck Exam: Full ROM, Normal Inspection.  absent: Lymphadenopathy





- Respiratory Exam


Respiratory Exam: Clear to Ausculation Bilateral, NORMAL BREATHING PATTERN





- Cardiovascular Exam


Cardiovascular Exam: REGULAR RHYTHM, +S1, +S2.  absent: Murmur





- GI/Abdominal Exam


GI & Abdominal Exam: Soft, Normal Bowel Sounds.  absent: Tenderness





- Rectal Exam


Rectal Exam: NORMAL INSPECTION





- Extremities Exam


Extremities Exam: Full ROM, Normal Capillary Refill, Normal Inspection.  absent:

Joint Swelling, Pedal Edema





- Back Exam


Back Exam: NORMAL INSPECTION





- Neurological Exam


Neurological Exam: Alert, Awake, CN II-XII Intact, Normal Gait, Oriented x3





- Psychiatric Exam


Psychiatric exam: Normal Affect, Normal Mood





- Skin


Skin Exam: Dry, Intact, Normal Color, Warm





Assessment and Plan


(1) MSSA (methicillin susceptible Staphylococcus aureus) septicemia


Status: Acute

## 2018-11-07 NOTE — CP.PCM.PN
<Jose Alejandro KapoorYahir - Last Filed: 11/07/18 09:59>





Subjective





- Date & Time of Evaluation


Date of Evaluation: 11/07/18


Time of Evaluation: 09:35





- Subjective


Subjective: 





Patient admitted on 10/20/18 initially for seizures and alcohol withdrawal, 

patient was febrile and found with bacteremia on BC and Echo was done on 10/22 

showed vegetations on aortic valve, patient also has history of IV drug abuse, 

patient is receiving ancef 2g via PICC Q8h on day 17th today. Patient seen this 

AM at bedside, NAD, patient is alert and oriented x 3, denies chest pain, SOB, 

cough, N/V/D, reports feeling well, afebrile.








Objective





- Vital Signs/Intake and Output


Vital Signs (last 24 hours): 


                                        











Temp Pulse Resp BP Pulse Ox


 


 98.1 F   52 L  20   128/64   100 


 


 11/07/18 08:23  11/07/18 08:23  11/07/18 08:23  11/07/18 08:23  11/07/18 08:23











- Medications


Medications: 


                               Current Medications





Amlodipine Besylate (Norvasc)  5 mg PO DAILY [facility]


   Last Admin: 11/07/18 09:36 Dose:  Not Given


Enoxaparin Sodium (Lovenox)  40 mg SC DAILY [facility]; Protocol


   Last Admin: 11/07/18 09:35 Dose:  40 mg


Folic Acid (Folic Acid)  1 mg PO DAILY [facility]


   Last Admin: 11/07/18 09:35 Dose:  1 mg


Cefazolin Sodium 2 gm/ Sodium (Chloride)  100 mls @ 100 mls/hr IVPB Q8 [facility]; 

Protocol


   Last Admin: 11/07/18 09:35 Dose:  100 mls/hr


Levetiracetam (Keppra)  500 mg PO BID [facility]


   Last Admin: 11/07/18 09:35 Dose:  500 mg


Multivitamins/Minerals (Therapeutic-M Tab)  1 tab PO DAILY [facility]


   Last Admin: 11/07/18 09:36 Dose:  1 tab


Thiamine HCl (Vitamin B1 Tab)  100 mg PO DAILY [facility]


   Last Admin: 11/07/18 09:37 Dose:  100 mg











- Labs


Labs: 


                                        





                                 11/02/18 04:25 





                                 11/06/18 05:30 





                                        











PT  12.6 Seconds (9.8-13.1)   10/19/18  23:05    


 


INR  1.1   10/19/18  23:05    


 


APTT  36.2 Seconds (25.6-37.1)   10/19/18  23:05    














- Head Exam


Head Exam: ATRAUMATIC, NORMOCEPHALIC





- Eye Exam


Eye Exam: EOMI





- ENT Exam


ENT Exam: Mucous Membranes Moist





- Neck Exam


Neck Exam: Full ROM





- Respiratory Exam


Respiratory Exam: Clear to Ausculation Bilateral





- Cardiovascular Exam


Cardiovascular Exam: RRR, +S1, +S2





- GI/Abdominal Exam


GI & Abdominal Exam: Soft, Normal Bowel Sounds.  absent: Tenderness





- Extremities Exam


Extremities Exam: absent: Pedal Edema





- Neurological Exam


Neurological Exam: Alert, Oriented x3





- Psychiatric Exam


Psychiatric exam: Normal Affect, Normal Mood





- Skin


Skin Exam: Normal Color, Warm





Assessment and Plan





- Assessment and Plan (Free Text)


Assessment: 





57 yo M with hx of non compliance with meds, Alcohol abuse and seizure, brought 

to the ED with recurrent witnessed seizures, alcohol withdrawl and AMS. Patient 

was also febrile on admission, and BC showed bacteremia + with MSSA, MARY showed 

aortic Valve vegetation and Ancef was started 2g IV Q8h via PICC on 10/22. 

Patient is stable in medsurg unit at this time.





Plan: 





1. Endocarditis Bacteremia 


-MARY + for vegetation on AV 


-blood cx-staph aureus, repeat BC negative 


-Picc line in place on Right arm 


-ID consult- Dr. Dumas- pt cant be d/c home with picc due to risk from hx of IV

drug abuse, as per ID 6 weeks Rx.


-Ancef 2g Q8hr Day #17


-Blood cxs normalized by day 5


-Cardiologist consult Dr. Barnes, recommendations appreciated


-F/u ID recommendations





2. Transaminitis


-MELD score 7


-Hep C ab-reactive


-Hepatitis C- Genotype 1A


-Hep C RNA PCR- pending results 


-,  11/2, LFT are trending down


-Abd us-Diffuse increased liver echogenicity compatible with fatty or other 

liver pathology, no masses or dilated ducts 


-HIV neg


-Non immune Hep B, Hep A negative





3. Status Epilepticus due to non compliance of medication and most probably to 

Alcohol withdrawal.


-Resolved


-Neurology consult Dr Mari, recomendations appreciatted.


-EEG:Conclusion: Normal EEG (Awake, Drowsy, and Asleep)


-Keppra 500mg PO Q12H


-Seizure precaution 


-PT eval and Tx





4. Alcohol Withdrawal


-resolved 


-CIWA score 0, Alc level <10


-Discontinued Librium and Ativan


-Folic Acid and Thiamine PO





5.HTN


-Norvasc 5mg QD


-monitor BP





6.Hypokalemia


-resolved





7. DVT prophylaxis


-Lovenox 40mg





Code Status


-Full





<Jazmín Campos - Last Filed: 11/07/18 13:36>





Objective





- Vital Signs/Intake and Output


Vital Signs (last 24 hours): 


                                        











Temp Pulse Resp BP Pulse Ox


 


 98.1 F   52 L  20   128/64   100 


 


 11/07/18 09:00  11/07/18 08:23  11/07/18 08:23  11/07/18 08:23  11/07/18 08:23











- Medications


Medications: 


                               Current Medications





Amlodipine Besylate (Norvasc)  5 mg PO DAILY [facility]


   Last Admin: 11/07/18 09:36 Dose:  Not Given


Enoxaparin Sodium (Lovenox)  40 mg SC DAILY [facility]; Protocol


   Last Admin: 11/07/18 09:35 Dose:  40 mg


Folic Acid (Folic Acid)  1 mg PO DAILY [facility]


   Last Admin: 11/07/18 09:35 Dose:  1 mg


Cefazolin Sodium 2 gm/ Sodium (Chloride)  100 mls @ 100 mls/hr IVPB Q8 [facility]; 

Protocol


   Last Admin: 11/07/18 09:35 Dose:  100 mls/hr


Levetiracetam (Keppra)  500 mg PO BID [facility]


   Last Admin: 11/07/18 09:35 Dose:  500 mg


Multivitamins/Minerals (Therapeutic-M Tab)  1 tab PO DAILY [facility]


   Last Admin: 11/07/18 09:36 Dose:  1 tab


Thiamine HCl (Vitamin B1 Tab)  100 mg PO DAILY [facility]


   Last Admin: 11/07/18 09:37 Dose:  100 mg











- Labs


Labs: 


                                        





                                 11/02/18 04:25 





                                 11/06/18 05:30 





                                        











PT  12.6 Seconds (9.8-13.1)   10/19/18  23:05    


 


INR  1.1   10/19/18  23:05    


 


APTT  36.2 Seconds (25.6-37.1)   10/19/18  23:05    














Attending/Attestation





- Attestation


I have personally seen and examined this patient.: Yes


I have fully participated in the care of the patient.: Yes


I have reviewed all pertinent clinical information, including history, physical 

exam and plan: Yes


Notes (Text): 





11/07/18 13:36


Seen, examined, and discussed with resident. 


Agree with findings and plan as above.

## 2018-11-08 RX ADMIN — MULTIPLE VITAMINS W/ MINERALS TAB SCH TAB: TAB at 10:01

## 2018-11-08 RX ADMIN — ENOXAPARIN SODIUM SCH MG: 40 INJECTION SUBCUTANEOUS at 10:01

## 2018-11-08 NOTE — CP.PCM.PN
Subjective





- Date & Time of Evaluation


Date of Evaluation: 11/08/18


Time of Evaluation: 09:40





- Subjective


Subjective: 





Patient seen this morning at bedside, NAD, patient is alert and oriented x 3, 

denies chest pain, SOB, cough, N/V/D, reports feeling well, afebrile.











Objective





- Vital Signs/Intake and Output


Vital Signs (last 24 hours): 


                                        











Temp Pulse Resp BP Pulse Ox


 


 97.9 F   42 L  20   132/67   99 


 


 11/08/18 08:33  11/08/18 08:33  11/08/18 08:33  11/08/18 08:33  11/08/18 08:33











- Medications


Medications: 


                               Current Medications





Amlodipine Besylate (Norvasc)  5 mg PO DAILY [facility]


   Last Admin: 11/07/18 09:36 Dose:  Not Given


Enoxaparin Sodium (Lovenox)  40 mg SC DAILY [facility]; Protocol


   Last Admin: 11/07/18 09:35 Dose:  40 mg


Cefazolin Sodium 2 gm/ Sodium (Chloride)  100 mls @ 100 mls/hr IVPB Q8 [facility]; 

Protocol


   Last Admin: 11/08/18 00:37 Dose:  100 mls/hr


Levetiracetam (Keppra)  500 mg PO BID [facility]


   Last Admin: 11/07/18 17:12 Dose:  500 mg











- Labs


Labs: 


                                        





                                 11/02/18 04:25 





                                 11/06/18 05:30 





                                        











PT  12.6 Seconds (9.8-13.1)   10/19/18  23:05    


 


INR  1.1   10/19/18  23:05    


 


APTT  36.2 Seconds (25.6-37.1)   10/19/18  23:05    














- Additional Findings


Additional findings: 





 Head Exam


Head Exam: ATRAUMATIC, NORMOCEPHALIC





- Eye Exam


Eye Exam: EOMI





- ENT Exam


ENT Exam: Mucous Membranes Moist





- Neck Exam


Neck Exam: Full ROM





- Respiratory Exam


Respiratory Exam: Clear to Ausculation Bilateral





- Cardiovascular Exam


Cardiovascular Exam: RRR, +S1, +S2





- GI/Abdominal Exam


GI & Abdominal Exam: Soft, Normal Bowel Sounds.  absent: Tenderness





- Extremities Exam


Extremities Exam: absent: Pedal Edema





- Neurological Exam


Neurological Exam: Alert, Oriented x3





- Psychiatric Exam


Psychiatric exam: Normal Affect, Normal Mood





- Skin


Skin Exam: Normal Color, Warm





Assessment and Plan





- Assessment and Plan (Free Text)


Assessment: 





57 yo M with hx of non compliance with meds, Alcohol abuse and seizure, brought 

to the ED with recurrent witnessed seizures, alcohol withdrawl and AMS. Patient 

was also febrile on admission, and BC showed bacteremia + with MSSA, MARY showed 

aortic Valve vegetation and Ancef was started 2g IV Q8h via PICC on 10/22. 

Patient is stable in medsurg unit at this time.





Plan: 





1. Endocarditis Bacteremia 


-stable and afebrile


-MARY + for vegetation on AV 


-blood cx-staph aureus, repeat BC negative 


-Picc line in place on Right arm 


-ID consult- Dr. Dumas- pt cant be d/c home with picc due to risk from hx of IV

drug abuse, as per ID 6 weeks Rx.


-Ancef 2g Q8hr Day #18


-Blood cxs normalized by day 5


-Cardiologist consult Dr. Barnes, recommendations appreciated


-F/u ID recommendations





2. Abnormal LFTs


-MELD score 7


-Hep C ab-reactive


-Hepatitis C- Genotype 1A


-Hep C RNA PCR- positive 


-,  11/2, LFT are trending down, f/u labs 11/9


-Abd us-Diffuse increased liver echogenicity compatible with fatty or other 

liver pathology, no masses or dilated ducts 


-HIV neg


-Non immune Hep B, Hep A negative





3. Status Epilepticus due to non compliance of medication and most probably to 

Alcohol withdrawal.


-Resolved


-Neurology consult Dr Mari, recomendations appreciatted.


-EEG:Conclusion: Normal EEG (Awake, Drowsy, and Asleep)


-Keppra 500mg PO Q12H


-Seizure precaution 


-PT eval and Tx





4. Alcohol Withdrawal


-resolved 


-CIWA score 0, Alc level <10


-Discontinued Librium and Ativan


-Folic Acid and Thiamine PO





5.HTN


-Norvasc 5mg QD


-monitor BP





6.Hypokalemia


-resolved





7. DVT prophylaxis


-Lovenox 40mg





Code Status


-Full

## 2018-11-08 NOTE — EEG
DATE:



REQUESTING PHYSICIAN:  Bulmaro Araujo MD



REASON FOR THE REQUEST:  Status epilepticus and delirium.



TECHNIQUE:  Routine electroencephalogram performed with Xltek digital

technology.  Electrodes were applied according to the 10-[employer]

Placement System; impedances were less than 5 kilo ohms.

Start; 10/23 at 17;19

End;  10/23 at 18;14

total; 47 min 



FINDINGS:  During the awake state, the background activity showed an

average frequency of 10 Hz in the posterior head region, normally reactive

to eye opening and eye closure; anterior head region frequencies were in

the better range.



Drowsiness was not seen.



Sleep was not seen.



Hyperventilation was performed as well as photic stimulation and there were

no changes in the record.



IMPRESSION:  This is a normal awake electroencephalogram.





__________________________________________

Kaushik Bal MD





DD:  11/06/2018 9:35:18

DT:  11/06/2018 13:32:20

Job # [id number]

MTDD

## 2018-11-09 LAB
ALBUMIN SERPL-MCNC: 3.8 G/DL (ref 3.5–5)
ALBUMIN/GLOB SERPL: 0.9 {RATIO} (ref 1–2.1)
ALT SERPL-CCNC: 83 U/L (ref 21–72)
AST SERPL-CCNC: 108 U/L (ref 17–59)
BUN SERPL-MCNC: 13 MG/DL (ref 9–20)
CALCIUM SERPL-MCNC: 9.4 MG/DL (ref 8.4–10.2)
ERYTHROCYTE [DISTWIDTH] IN BLOOD BY AUTOMATED COUNT: 13.1 % (ref 11.5–14.5)
GFR NON-AFRICAN AMERICAN: > 60
HGB BLD-MCNC: 12.3 G/DL (ref 12–18)
MCH RBC QN AUTO: 29.4 PG (ref 27–31)
MCHC RBC AUTO-ENTMCNC: 31.9 G/DL (ref 33–37)
MCV RBC AUTO: 92.3 FL (ref 80–94)
PLATELET # BLD: 227 K/UL (ref 130–400)
RBC # BLD AUTO: 4.18 MIL/UL (ref 4.4–5.9)
WBC # BLD AUTO: 6.4 K/UL (ref 4.8–10.8)

## 2018-11-09 RX ADMIN — ENOXAPARIN SODIUM SCH MG: 40 INJECTION SUBCUTANEOUS at 08:44

## 2018-11-09 NOTE — CP.PCM.PN
<Jose Alejandro KapoorYahir - Last Filed: 11/09/18 10:27>





Subjective





- Date & Time of Evaluation


Date of Evaluation: 11/09/18


Time of Evaluation: 09:15





- Subjective


Subjective: 





Patient seen today at bedside, NAD, patient is alert and oriented x 3, denies 

chest pain, SOB, cough, N/V/D, afebrile.





Patient admitted on 10/20/18 initially for seizures and alcohol withdrawal, 

patient was febrile and found with bacteremia on BC and Echo was done on 10/22 

showed vegetations on aortic valve, patient also has history of IV drug abuse, 

patient is receiving ancef 2g via PICC Q8h on day 19th today. Patient on 

antibiotic for completion of endocarditis treatment for 6 weeks.





Objective





- Vital Signs/Intake and Output


Vital Signs (last 24 hours): 


                                        











Temp Pulse Resp BP Pulse Ox


 


 97.5 F L  54 L  20   125/62   100 


 


 11/09/18 08:42  11/09/18 08:45  11/09/18 08:42  11/09/18 08:42  11/09/18 08:42











- Medications


Medications: 


                               Current Medications





Amlodipine Besylate (Norvasc)  5 mg PO DAILY [facility]


   Last Admin: 11/09/18 08:45 Dose:  Not Given


Enoxaparin Sodium (Lovenox)  40 mg SC DAILY [facility]; Protocol


   Last Admin: 11/09/18 08:44 Dose:  40 mg


Cefazolin Sodium 2 gm/ Sodium (Chloride)  100 mls @ 100 mls/hr IVPB Q8 [facility]; 

Protocol


   Last Admin: 11/09/18 08:59 Dose:  100 mls/hr


Levetiracetam (Keppra)  500 mg PO BID [facility]


   Last Admin: 11/09/18 08:45 Dose:  500 mg











- Labs


Labs: 


                                        





                                 11/09/18 05:20 





                                 11/09/18 05:20 





                                        











PT  12.6 Seconds (9.8-13.1)   10/19/18  23:05    


 


INR  1.1   10/19/18  23:05    


 


APTT  36.2 Seconds (25.6-37.1)   10/19/18  23:05    














- Constitutional


Appears: No Acute Distress





- Head Exam


Head Exam: ATRAUMATIC, NORMOCEPHALIC





- Eye Exam


Eye Exam: EOMI





- ENT Exam


ENT Exam: Mucous Membranes Moist





- Neck Exam


Neck Exam: Full ROM





- Respiratory Exam


Respiratory Exam: Clear to Ausculation Bilateral





- Cardiovascular Exam


Cardiovascular Exam: RRR, +S1, +S2





- GI/Abdominal Exam


GI & Abdominal Exam: Soft, Normal Bowel Sounds





- Extremities Exam


Extremities Exam: Full ROM.  absent: Pedal Edema





- Neurological Exam


Neurological Exam: Alert, Awake





- Psychiatric Exam


Psychiatric exam: Normal Mood





- Skin


Skin Exam: Normal Color, Warm





Assessment and Plan





- Assessment and Plan (Free Text)


Assessment: 





57 yo M with hx of non compliance with meds, Alcohol abuse and seizure, brought 

to the ED with recurrent witnessed seizures, alcohol withdrawl and AMS. Patient 

was also febrile on admission, and BC showed bacteremia + with MSSA, MARY showed 

aortic Valve vegetation and Ancef was started 2g IV Q8h via PICC on 10/22. 

Patient is stable in medsurg unit.





Plan: 





1. Endocarditis Bacteremia 


-stable and afebrile


-MARY + for vegetation on AV 


-blood cx-staph aureus, repeat BC negative 


-Picc line in place on Right arm 


-ID consult- Dr. Dumas- pt can't be d/c home with picc due to risk from hx of 

IV drug abuse, as per ID 6 weeks Rx.


-Ancef 2g Q8hr Day #19


-Blood cxs normalized by day 5


-Cardiologist consult Dr. Barnes, recommendations appreciated


-F/u ID recommendations





2. Abnormal LFTs


-MELD score 7


-Hep C ab-reactive


-Hepatitis C- Genotype 1A


-Hep C RNA PCR- positive 


-, ALT 83 11/9, LFT are trending down


-Abd us-Diffuse increased liver echogenicity compatible with fatty or other 

liver pathology, no masses or dilated ducts 


-HIV neg


-Non immune Hep B, Hep A negative





3. Status Epilepticus due to non compliance of medication and most probably to 

Alcohol withdrawal.


-Resolved


-Neurology consult Dr Mari, recomendations appreciatted.


-EEG:Conclusion: Normal EEG (Awake, Drowsy, and Asleep)


-Keppra 500mg PO Q12H


-Seizure precaution 


-PT eval and Tx





4. Alcohol Withdrawal


-resolved 


-CIWA score 0, Alc level <10


-Discontinued Librium and Ativan


-Folic Acid and Thiamine PO





5.HTN


-Norvasc 5mg QD


-monitor BP





6.Hypokalemia


-resolved





7. DVT prophylaxis


-Lovenox 40mg





Code Status


-Full








<Jazmín Campos - Last Filed: 11/17/18 10:18>





Objective





- Vital Signs/Intake and Output


Vital Signs (last 24 hours): 


                                        











Temp Pulse Resp BP Pulse Ox


 


 97.8 F   45 L  19   129/62   98 


 


 11/17/18 07:56  11/17/18 08:16  11/17/18 07:56  11/17/18 07:56  11/17/18 07:56











- Medications


Medications: 


                               Current Medications





Amlodipine Besylate (Norvasc)  5 mg PO DAILY [facility]


   Last Admin: 11/17/18 10:03 Dose:  5 mg


Enoxaparin Sodium (Lovenox)  40 mg SC DAILY [facility]; Protocol


   Last Admin: 11/17/18 09:53 Dose:  40 mg


Cefazolin Sodium 2 gm/ Sodium (Chloride)  100 mls @ 100 mls/hr IVPB Q8 [facility]; 

Protocol


   Last Admin: 11/17/18 10:02 Dose:  100 mls/hr


Levetiracetam (Keppra)  500 mg PO BID [facility]


   Last Admin: 11/17/18 09:52 Dose:  500 mg











- Labs


Labs: 


                                        





                                 11/15/18 06:10 





                                 11/15/18 06:10 





                                        











PT  12.6 Seconds (9.8-13.1)   10/19/18  23:05    


 


INR  1.1   10/19/18  23:05    


 


APTT  36.2 Seconds (25.6-37.1)   10/19/18  23:05    














Attending/Attestation





- Attestation


I have personally seen and examined this patient.: Yes


I have fully participated in the care of the patient.: Yes


I have reviewed all pertinent clinical information, including history, physical 

exam and plan: Yes


Notes (Text): 





11/17/18 10:18


Seen, examined, and discussed with resident. Agree with findings and plan as 

above.

## 2018-11-09 NOTE — CP.PCM.PN
Subjective





- Date & Time of Evaluation


Date of Evaluation: 11/09/18


Time of Evaluation: 08:00





- Subjective


Subjective: 





iv rx in progress


afeb





Objective





- Vital Signs/Intake and Output


Vital Signs (last 24 hours): 


                                        











Temp Pulse Resp BP Pulse Ox


 


 97.5 F L  54 L  20   125/62   100 


 


 11/09/18 08:42  11/09/18 08:45  11/09/18 08:42  11/09/18 08:42  11/09/18 08:42











- Medications


Medications: 


                               Current Medications





Amlodipine Besylate (Norvasc)  5 mg PO DAILY [facility]


   Last Admin: 11/09/18 08:45 Dose:  Not Given


Enoxaparin Sodium (Lovenox)  40 mg SC DAILY [facility]; Protocol


   Last Admin: 11/09/18 08:44 Dose:  40 mg


Cefazolin Sodium 2 gm/ Sodium (Chloride)  100 mls @ 100 mls/hr IVPB Q8 [facility]; 

Protocol


   Last Admin: 11/09/18 08:59 Dose:  100 mls/hr


Levetiracetam (Keppra)  500 mg PO BID [facility]


   Last Admin: 11/09/18 08:45 Dose:  500 mg











- Labs


Labs: 


                                        





                                 11/09/18 05:20 





                                 11/09/18 05:20 





                                        











PT  12.6 Seconds (9.8-13.1)   10/19/18  23:05    


 


INR  1.1   10/19/18  23:05    


 


APTT  36.2 Seconds (25.6-37.1)   10/19/18  23:05    














Assessment and Plan


(1) MSSA (methicillin susceptible Staphylococcus aureus) septicemia


Status: Acute

## 2018-11-10 RX ADMIN — ENOXAPARIN SODIUM SCH MG: 40 INJECTION SUBCUTANEOUS at 09:34

## 2018-11-10 NOTE — CARD
--------------- APPROVED REPORT --------------





Date of service: 10/30/2018



EXAM: Transesophageal echocardiogram with color flow Doppler.



INDICATION

Infection : Rule out subacute bacterial endocarditis 



Aortic Valve

AI P 1/2 [id number]



Mitral Valve

E/A ratio0.0



TDI

E/Lateral E'0.0E/Medial E'0.0



Reason For Test : Rule out endocarditis.



PROCEDURE

After obtaining informed consent, patient underwent transesophageal 

echo in the [facility].

Type of Sedation : Conscious Sedation

Sedation was provided by anesthesiologist.

Sedation was administered by Lamin. 

Sedation was achieved with Versed,, Fentanyl  intravenously. 

Transesophageal probe was inserted and advanced into esophagus 

without difficulty.

Echo enhancement indication: R/O Septal defect.

Echo enhancement agent administered: Agitated Saline

The SUSHILA was performed without complications. 

Throughout the procedure, the blood pressure, pulse oximetry, cardiac 

rhythm, and rate were monitored.

The patient tolerated the procedure without adverse effects. Recovery 

from conscious sedation was uneventful and vital signs were stable.



 LEFT VENTRICLE 

The Left Ventricle is mildly dilated. 

There is mild concentric left ventricular hypertrophy.

The left ventricular function is normal.

The left ventricular ejection fraction is within the normal range.

The Ejection Fraction is 55-60%.

There is normal LV segmental wall motion.

Transmitral Doppler flow pattern is Grade I-abnormal relaxation 

pattern.

No left ventricle thrombus noted on this study.

There is no ventricular septal defect visualized.

There is no left ventricular aneurysm. 

There is no mass noted in the left ventricle.



 RIGHT VENTRICLE 

The right ventricle is normal size.

There is normal right ventricular wall thickness.

The right ventricular systolic function is normal.



 ATRIA 

The left atrium is mildly dilated. 

The right atrium size is normal.

The interatrial septum is intact with no evidence for an atrial 

septal defect.



 AORTIC VALVE 

The aortic valve is mildly to moderately sclerotic.

The aortic valve is tri-cuspid.

There is a vegetation attached to the right and left coronary cusp of 

the aortic valve with partial fusion of the valves. 

There is an eccentric jet of aortic insufficiency directed against 

the anterior mitral leaflet.

There is moderate aortic regurgitation. 

There is no aortic valvular stenosis. 

There is small size aortic valvular vegetation.



 MITRAL VALVE 

The mitral valve is normal in structure.

There is no evidence of mitral valve prolapse.

There is no mitral valve stenosis.

Mitral regurgitation is trace to mild.



 TRICUSPID VALVE 

The tricuspid valve is normal in structure.

There is trace to mild tricuspid regurgitation.

There is no tricuspid valve prolapse or vegetation.

There is no tricuspid valve stenosis. 



 PULMONIC VALVE 

The pulmonary valve is normal in structure.

There is no pulmonic valvular regurgitation. 

There is no pulmonic valvular stenosis.



 GREAT VESSELS 

The aortic root is normal in size.

The IVC is normal in size and collapses >50% with inspiration.



 PERICARDIAL EFFUSION 

There is no pericardial effusion.

There is no pleural effusion.



<Conclusion>

The aortic valve is mildly to moderately sclerotic.

The aortic valve is tri-cuspid.

There is a vegetation attached to the right and left coronary cusp of 

the aortic valve with partial fusion of the valves.

There is an eccentric jet of aortic insufficiency directed against 

the anterior mitral leaflet.

There is moderate aortic regurgitation.

The Left Ventricle is mildly dilated.

The left ventricular function is normal.

The left ventricular ejection fraction is within the normal range.

The Ejection Fraction is 55-60%.

## 2018-11-10 NOTE — CP.PCM.PN
<Nigel Melvin - Last Filed: 11/10/18 10:00>





Subjective





- Date & Time of Evaluation


Date of Evaluation: 11/10/18


Time of Evaluation: 08:32





- Subjective


Subjective: 





ABX day 20/42. Pt seen and evaluated at bedside. No acute events overnight. 

Sitting in bed comfortably, NAD. Tolerating PO intake w/o issue. Afebrile. 

Tolerating ABX, no new complaints/concerns. 





Objective





- Vital Signs/Intake and Output


Vital Signs (last 24 hours): 


                                        











Temp Pulse Resp BP Pulse Ox


 


 97.8 F   78   18   152/61 H  99 


 


 11/09/18 23:53  11/09/18 23:53  11/09/18 23:53  11/09/18 23:53  11/09/18 23:53











- Medications


Medications: 


                               Current Medications





Amlodipine Besylate (Norvasc)  5 mg PO DAILY [facility]


   Last Admin: 11/09/18 08:45 Dose:  Not Given


Enoxaparin Sodium (Lovenox)  40 mg SC DAILY [facility]; Protocol


   Last Admin: 11/09/18 08:44 Dose:  40 mg


Cefazolin Sodium 2 gm/ Sodium (Chloride)  100 mls @ 100 mls/hr IVPB Q8 [facility]; 

Protocol


   Last Admin: 11/10/18 01:28 Dose:  100 mls/hr


Levetiracetam (Keppra)  500 mg PO BID [facility]


   Last Admin: 11/09/18 16:35 Dose:  500 mg











- Labs


Labs: 


                                        





                                 11/09/18 05:20 





                                 11/09/18 05:20 





                                        











PT  12.6 Seconds (9.8-13.1)   10/19/18  23:05    


 


INR  1.1   10/19/18  23:05    


 


APTT  36.2 Seconds (25.6-37.1)   10/19/18  23:05    














- Constitutional


Appears: Non-toxic, No Acute Distress





- Head Exam


Head Exam: ATRAUMATIC





- Eye Exam


Eye Exam: EOMI.  absent: Nystagmus, Scleral icterus


Pupil Exam: PERRL





- ENT Exam


ENT Exam: Mucous Membranes Moist





- Respiratory Exam


Respiratory Exam: Clear to Ausculation Bilateral, NORMAL BREATHING PATTERN.  

absent: Rales, Rhonchi, Wheezes





- Cardiovascular Exam


Cardiovascular Exam: REGULAR RHYTHM, RRR, +S1, +S2.  absent: JVD, Rubs, Murmur





- GI/Abdominal Exam


GI & Abdominal Exam: Soft, Normal Bowel Sounds.  absent: Tenderness





- Extremities Exam


Extremities Exam: Normal Capillary Refill, Normal Inspection.  absent: Calf 

Tenderness, Pedal Edema





- Neurological Exam


Neurological Exam: Alert, Awake, Normal Gait





- Psychiatric Exam


Psychiatric exam: Normal Affect, Normal Mood





Assessment and Plan





- Assessment and Plan (Free Text)


Assessment: 





59 yo M with hx of non compliance with meds, Alcohol abuse and seizure, brought 

to the ED with recurrent witnessed seizures, alcohol withdrawl and AMS. Patient 

was also febrile on admission, and BC showed bacteremia + with MSSA, MARY showed 

aortic Valve vegetation and Ancef was started 2g IV Q8h via PICC on 10/22. 

Patient is stable in medsurg unit.





Plan: 





1. Endocarditis Bacteremia 


-stable and afebrile


-MARY + for vegetation on AV 


-blood cx-staph aureus, repeat BC negative 


-Picc line in place on Right arm 


-ID consult- Dr. Dumas- pt can't be d/c home with picc due to risk from hx of 

IV drug abuse, as per ID 6 weeks Rx.


-Ancef 2g Q8hr Day #20


-Blood cxs normalized by day 5


-Cardiologist consult Dr. Barnes, recommendations appreciated


-F/u ID recommendations





2. Abnormal LFTs


-MELD score 7


-Hep C ab-reactive


-Hepatitis C- Genotype 1A


-Hep C RNA PCR- positive 


-, ALT 83 11/9, LFT are trending down


-Abd us-Diffuse increased liver echogenicity compatible with fatty or other 

liver pathology, no masses or dilated ducts 


-HIV neg


-Non immune Hep B, Hep A negative





3. Status Epilepticus due to non compliance of medication and most probably to 

Alcohol withdrawal.


-Resolved


-Neurology consult Dr Mari, recomendations appreciatted.


-EEG:Conclusion: Normal EEG (Awake, Drowsy, and Asleep)


-Keppra 500mg PO Q12H


-Seizure precaution 


-PT eval and Tx





4. Alcohol Withdrawal


-resolved 


-CIWA score 0, Alc level <10


-Discontinued Librium and Ativan


-Folic Acid and Thiamine PO





5.HTN


-Norvasc 5mg QD


-monitor BP





6.Hypokalemia


-resolved





7. DVT prophylaxis


-Lovenox 40mg





Code Status


-Full





<Anna Otero - Last Filed: 11/10/18 15:50>





Objective





- Vital Signs/Intake and Output


Vital Signs (last 24 hours): 


                                        











Temp Pulse Resp BP Pulse Ox


 


 97.9 F   40 L  19   127/60   96 


 


 11/10/18 09:02  11/10/18 09:33  11/10/18 09:02  11/10/18 09:33  11/10/18 09:02











- Medications


Medications: 


                               Current Medications





Amlodipine Besylate (Norvasc)  5 mg PO DAILY [facility]


   Last Admin: 11/10/18 09:33 Dose:  Not Given


Enoxaparin Sodium (Lovenox)  40 mg SC DAILY [facility]; Protocol


   Last Admin: 11/10/18 09:34 Dose:  40 mg


Cefazolin Sodium 2 gm/ Sodium (Chloride)  100 mls @ 100 mls/hr IVPB Q8 [facility]; 

Protocol


   Last Admin: 11/10/18 01:28 Dose:  100 mls/hr


Levetiracetam (Keppra)  500 mg PO BID [facility]


   Last Admin: 11/10/18 09:33 Dose:  500 mg











- Labs


Labs: 


                                        





                                 11/09/18 05:20 





                                 11/09/18 05:20 





                                        











PT  12.6 Seconds (9.8-13.1)   10/19/18  23:05    


 


INR  1.1   10/19/18  23:05    


 


APTT  36.2 Seconds (25.6-37.1)   10/19/18  23:05    














Attending/Attestation





- Attestation


I have personally seen and examined this patient.: Yes


I have fully participated in the care of the patient.: Yes


I have reviewed all pertinent clinical information, including history, physical 

exam and plan: Yes

## 2018-11-11 RX ADMIN — ENOXAPARIN SODIUM SCH MG: 40 INJECTION SUBCUTANEOUS at 08:34

## 2018-11-11 NOTE — CP.PCM.PN
<Jose Alejandro KapoorYahir - Last Filed: 11/11/18 10:17>





Subjective





- Date & Time of Evaluation


Date of Evaluation: 11/11/18


Time of Evaluation: 08:45





- Subjective


Subjective: 





Patient admitted on 10/20/18 initially for seizures and alcohol withdrawal, 

patient was febrile and found with bacteremia on BC and Echo was done on 10/22 

showed vegetations on aortic valve, patient also has history of IV drug abuse, 

patient is receiving ancef 2g via PICC Q8h on day 21 today. Patient seen this 

morning at bedside, NAD, patient is alert and oriented x 3, denies chest pain, 

SOB, cough, N/V/D, reports feeling well, is afebrile.











Objective





- Vital Signs/Intake and Output


Vital Signs (last 24 hours): 


                                        











Temp Pulse Resp BP Pulse Ox


 


 97.9 F   74   20   145/69   100 


 


 11/11/18 08:09  11/11/18 08:09  11/11/18 08:09  11/11/18 08:09  11/11/18 08:09











- Medications


Medications: 


                               Current Medications





Amlodipine Besylate (Norvasc)  5 mg PO DAILY [facility]


   Last Admin: 11/11/18 08:34 Dose:  5 mg


Enoxaparin Sodium (Lovenox)  40 mg SC DAILY [facility]; Protocol


   Last Admin: 11/11/18 08:34 Dose:  40 mg


Cefazolin Sodium 2 gm/ Sodium (Chloride)  100 mls @ 100 mls/hr IVPB Q8 [facility]; 

Protocol


   Last Admin: 11/11/18 08:34 Dose:  100 mls/hr


Levetiracetam (Keppra)  500 mg PO BID [facility]


   Last Admin: 11/11/18 08:34 Dose:  500 mg











- Labs


Labs: 


                                        





                                 11/09/18 05:20 





                                 11/09/18 05:20 





                                        











PT  12.6 Seconds (9.8-13.1)   10/19/18  23:05    


 


INR  1.1   10/19/18  23:05    


 


APTT  36.2 Seconds (25.6-37.1)   10/19/18  23:05    














- Constitutional


Appears: No Acute Distress





- Head Exam


Head Exam: ATRAUMATIC, NORMOCEPHALIC





- Eye Exam


Eye Exam: EOMI





- ENT Exam


ENT Exam: Mucous Membranes Moist





- Neck Exam


Neck Exam: Full ROM





- Respiratory Exam


Respiratory Exam: Clear to Ausculation Bilateral





- Cardiovascular Exam


Cardiovascular Exam: RRR, +S1, +S2





- GI/Abdominal Exam


GI & Abdominal Exam: Soft.  absent: Tenderness





- Extremities Exam


Extremities Exam: Full ROM.  absent: Pedal Edema





- Neurological Exam


Neurological Exam: Alert, Awake, Oriented x3





- Skin


Skin Exam: Normal Color, Warm





Assessment and Plan





- Assessment and Plan (Free Text)


Assessment: 





59 yo M with hx of non compliance with meds, Alcohol abuse and seizure, brought 

to the ED with recurrent witnessed seizures, alcohol withdrawl and AMS. Patient 

was also febrile on admission, and BC showed bacteremia + with MSSA, MARY showed 

aortic Valve vegetation and Ancef was started 2g IV Q8h via PICC on 10/22. 

Patient is stable in medsurg unit.





Plan: 





1. Endocarditis Bacteremia 


-stable and afebrile


-MARY + for vegetation on AV 


-blood cx-staph aureus, repeat BC negative 


-Picc line in place on Right arm 


-ID consult- Dr. Dumas- pt can't be d/c home with picc due to risk from hx of 

IV drug abuse, as per ID 6 weeks Rx.


-Ancef 2g Q8hr Day #21


-Blood cxs normalized by day 5


-Cardiologist consult Dr. Barnes, recommendations appreciated


-F/u ID recommendations





2. Abnormal LFTs


-MELD score 7


-Hep C ab-reactive


-Hepatitis C- Genotype 1A


-Hep C RNA PCR- positive 


-, ALT 83 11/9, LFT are trending down


-Abd us-Diffuse increased liver echogenicity compatible with fatty or other 

liver pathology, no masses or dilated ducts 


-HIV neg


-Non immune Hep B, Hep A negative





3. Status Epilepticus due to non compliance of medication and most probably to A

lcohol withdrawal.


-Resolved


-Neurology consult Dr Mari, recomendations appreciatted.


-EEG:Conclusion: Normal EEG (Awake, Drowsy, and Asleep)


-Keppra 500mg PO Q12H


-Seizure precaution 


-PT eval and Tx





4. Alcohol Withdrawal


-resolved 


-CIWA score 0, Alc level <10


-Discontinued Librium and Ativan


-Folic Acid and Thiamine PO





5.HTN


-Norvasc 5mg QD


-monitor BP





6.Hypokalemia


-resolved





7. DVT prophylaxis


-Lovenox 40mg





Code Status


-Full





<Anna Otero - Last Filed: 11/11/18 16:09>





Objective





- Vital Signs/Intake and Output


Vital Signs (last 24 hours): 


                                        











Temp Pulse Resp BP Pulse Ox


 


 97.9 F   74   20   145/69   100 


 


 11/11/18 08:09  11/11/18 08:09  11/11/18 08:09  11/11/18 08:09  11/11/18 08:09











- Medications


Medications: 


                               Current Medications





Amlodipine Besylate (Norvasc)  5 mg PO DAILY [facility]


   Last Admin: 11/11/18 08:34 Dose:  5 mg


Enoxaparin Sodium (Lovenox)  40 mg SC DAILY [facility]; Protocol


   Last Admin: 11/11/18 08:34 Dose:  40 mg


Cefazolin Sodium 2 gm/ Sodium (Chloride)  100 mls @ 100 mls/hr IVPB Q8 [facility]; 

Protocol


   Last Admin: 11/11/18 08:34 Dose:  100 mls/hr


Levetiracetam (Keppra)  500 mg PO BID [facility]


   Last Admin: 11/11/18 08:34 Dose:  500 mg











- Labs


Labs: 


                                        





                                 11/09/18 05:20 





                                 11/09/18 05:20 





                                        











PT  12.6 Seconds (9.8-13.1)   10/19/18  23:05    


 


INR  1.1   10/19/18  23:05    


 


APTT  36.2 Seconds (25.6-37.1)   10/19/18  23:05    














Attending/Attestation





- Attestation


I have personally seen and examined this patient.: Yes


I have fully participated in the care of the patient.: Yes


I have reviewed all pertinent clinical information, including history, physical 

exam and plan: Yes

## 2018-11-11 NOTE — CP.PCM.PN
Subjective





- Date & Time of Evaluation


Date of Evaluation: 11/11/18


Time of Evaluation: 08:00





- Subjective


Subjective: 





IV RX TOLERAATED WELL


WILL RENEW





Objective





- Vital Signs/Intake and Output


Vital Signs (last 24 hours): 


                                        











Temp Pulse Resp BP Pulse Ox


 


 97.9 F   74   20   145/69   100 


 


 11/11/18 08:09  11/11/18 08:09  11/11/18 08:09  11/11/18 08:09  11/11/18 08:09











- Medications


Medications: 


                               Current Medications





Amlodipine Besylate (Norvasc)  5 mg PO DAILY [facility]


   Last Admin: 11/11/18 08:34 Dose:  5 mg


Enoxaparin Sodium (Lovenox)  40 mg SC DAILY [facility]; Protocol


   Last Admin: 11/11/18 08:34 Dose:  40 mg


Cefazolin Sodium 2 gm/ Sodium (Chloride)  100 mls @ 100 mls/hr IVPB Q8 [facility]; 

Protocol


   Last Admin: 11/11/18 08:34 Dose:  100 mls/hr


Levetiracetam (Keppra)  500 mg PO BID [facility]


   Last Admin: 11/11/18 08:34 Dose:  500 mg











- Labs


Labs: 


                                        





                                 11/09/18 05:20 





                                 11/09/18 05:20 





                                        











PT  12.6 Seconds (9.8-13.1)   10/19/18  23:05    


 


INR  1.1   10/19/18  23:05    


 


APTT  36.2 Seconds (25.6-37.1)   10/19/18  23:05    














- Constitutional


Appears: Well





- Head Exam


Head Exam: ATRAUMATIC, NORMAL INSPECTION, NORMOCEPHALIC





- Eye Exam


Eye Exam: EOMI, Normal appearance, PERRL


Pupil Exam: NORMAL ACCOMODATION, PERRL





- ENT Exam


ENT Exam: Mucous Membranes Moist, Normal Exam





- Neck Exam


Neck Exam: Full ROM, Normal Inspection.  absent: Lymphadenopathy





- Respiratory Exam


Respiratory Exam: Clear to Ausculation Bilateral, NORMAL BREATHING PATTERN





- Cardiovascular Exam


Cardiovascular Exam: REGULAR RHYTHM, +S1, +S2.  absent: Murmur





- GI/Abdominal Exam


GI & Abdominal Exam: Soft, Normal Bowel Sounds.  absent: Tenderness





- Rectal Exam


Rectal Exam: NORMAL INSPECTION





- Extremities Exam


Extremities Exam: Full ROM, Normal Capillary Refill, Normal Inspection.  absent:

Joint Swelling, Pedal Edema





- Back Exam


Back Exam: NORMAL INSPECTION





- Neurological Exam


Neurological Exam: Alert, Awake, CN II-XII Intact, Normal Gait, Oriented x3





- Psychiatric Exam


Psychiatric exam: Normal Affect, Normal Mood





- Skin


Skin Exam: Dry, Intact, Normal Color, Warm





Assessment and Plan


(1) MSSA (methicillin susceptible Staphylococcus aureus) septicemia


Status: Acute   





- Assessment and Plan (Free Text)


Assessment: 





CONT RX  ENDOCARDITIS

## 2018-11-12 LAB
ALBUMIN SERPL-MCNC: 3.6 G/DL (ref 3.5–5)
ALBUMIN/GLOB SERPL: 0.9 {RATIO} (ref 1–2.1)
ALT SERPL-CCNC: 78 U/L (ref 21–72)
AST SERPL-CCNC: 94 U/L (ref 17–59)
BUN SERPL-MCNC: 9 MG/DL (ref 9–20)
CALCIUM SERPL-MCNC: 9.1 MG/DL (ref 8.4–10.2)
GFR NON-AFRICAN AMERICAN: > 60

## 2018-11-12 RX ADMIN — ENOXAPARIN SODIUM SCH MG: 40 INJECTION SUBCUTANEOUS at 08:47

## 2018-11-12 NOTE — CP.PCM.PN
<GisselYarelis - Last Filed: 11/12/18 08:21>





Subjective





- Date & Time of Evaluation


Date of Evaluation: 11/12/18


Time of Evaluation: 08:07





- Subjective


Subjective: 





Patient seen and evaluated at bedside. Patient is resting comfortably and in 

NAD. Afebrile, no acute events overnight. Patient eating breakfast and 

tolerating PO intake w/o issue. Patient receiving 2g Ancef day 22/42. Patient 

denies any new complaints, denies N/V/F/SOB/CP.











Objective





- Vital Signs/Intake and Output


Vital Signs (last 24 hours): 


                                        











Temp Pulse Resp BP Pulse Ox


 


 98.4 F   46 L  18   107/47 L  98 


 


 11/12/18 00:51  11/12/18 00:51  11/12/18 00:51  11/12/18 00:51  11/12/18 00:51











- Medications


Medications: 


                               Current Medications





Amlodipine Besylate (Norvasc)  5 mg PO DAILY [facility]


   Last Admin: 11/11/18 08:34 Dose:  5 mg


Enoxaparin Sodium (Lovenox)  40 mg SC DAILY [facility]; Protocol


   Last Admin: 11/11/18 08:34 Dose:  40 mg


Cefazolin Sodium 2 gm/ Sodium (Chloride)  100 mls @ 100 mls/hr IVPB Q8 [facility]; 

Protocol


   Last Admin: 11/12/18 00:38 Dose:  100 mls/hr


Levetiracetam (Keppra)  500 mg PO BID [facility]


   Last Admin: 11/11/18 16:52 Dose:  500 mg











- Labs


Labs: 


                                        





                                 11/09/18 05:20 





                                 11/12/18 06:10 





                                        











PT  12.6 Seconds (9.8-13.1)   10/19/18  23:05    


 


INR  1.1   10/19/18  23:05    


 


APTT  36.2 Seconds (25.6-37.1)   10/19/18  23:05    














- Constitutional


Appears: Well, Non-toxic, No Acute Distress





- Head Exam


Head Exam: ATRAUMATIC, NORMOCEPHALIC





- ENT Exam


ENT Exam: Mucous Membranes Moist





- Respiratory Exam


Respiratory Exam: Clear to Ausculation Bilateral





- Cardiovascular Exam


Cardiovascular Exam: RRR





- GI/Abdominal Exam


GI & Abdominal Exam: Soft, Normal Bowel Sounds





- Extremities Exam


Extremities Exam: Normal Capillary Refill, Normal Inspection.  absent: Calf Ten

derness, Pedal Edema, Tenderness





- Neurological Exam


Neurological Exam: Alert, Awake





- Psychiatric Exam


Psychiatric exam: Normal Affect, Normal Mood





- Skin


Skin Exam: Normal Color, Warm





Assessment and Plan





- Assessment and Plan (Free Text)


Assessment: 


58M with hx of non compliance with meds, Alcohol abuse and seizure, brought to 

the ED with recurrent witnessed seizures, alcohol withdrawl and AMS. Patient was

also febrile on admission, and BC showed bacteremia + with MSSA, MARY showed 

aortic Valve vegetation and Ancef was started 2g IV Q8h via PICC on 10/22. 

Patient is stable in medsurg unit.





Plan: 





1. Endocarditis Bacteremia 


-stable and afebrile


-MARY + for vegetation on AV 


-blood cx-staph aureus, repeat BC negative 


-Picc line in place on Right arm 


-ID consult- Dr. Dumas- pt can't be d/c home with picc due to risk from hx of 

IV drug abuse, as per ID 6 weeks Rx.


-Ancef 2g Q8hr Day #22


-Blood cxs normalized by day 5


-Cardiologist consult Dr. Barnes, recommendations appreciated


-F/u ID recommendations





2. Abnormal LFTs


-MELD score 7


-Hep C ab-reactive


-Hepatitis C- Genotype 1A


-Hep C RNA PCR- positive 


-AST 94, ALT 78 11/12, LFT are trending down


-Abd us-Diffuse increased liver echogenicity compatible with fatty or other 

liver pathology, no masses or dilated ducts 


-HIV neg


-Non immune Hep B, Hep A negative





3. Status Epilepticus due to non compliance of medication and most probably to 

Alcohol withdrawal.


-Resolved


-Neurology consult Dr Mari, recomendations appreciatted.


-EEG:Conclusion: Normal EEG (Awake, Drowsy, and Asleep)


-Keppra 500mg PO Q12H


-Seizure precaution 


-PT eval and Tx





4. Alcohol Withdrawal


-resolved 


-CIWA score 0, Alc level <10


-Discontinued Librium and Ativan


-Folic Acid and Thiamine PO





5.HTN


-Norvasc 5mg QD


-monitor BP





6.Hypokalemia


-resolved





7. DVT prophylaxis


-Lovenox 40mg





Code Status


-Full








<Anna Otero - Last Filed: 11/12/18 16:07>





Objective





- Vital Signs/Intake and Output


Vital Signs (last 24 hours): 


                                        











Temp Pulse Resp BP Pulse Ox


 


 98.1 F   52 L  18   126/69   97 


 


 11/12/18 16:00  11/12/18 16:00  11/12/18 16:00  11/12/18 16:00  11/12/18 16:00











- Medications


Medications: 


                               Current Medications





Amlodipine Besylate (Norvasc)  5 mg PO DAILY [facility]


   Last Admin: 11/12/18 08:47 Dose:  Not Given


Enoxaparin Sodium (Lovenox)  40 mg SC DAILY [facility]; Protocol


   Last Admin: 11/12/18 08:47 Dose:  40 mg


Cefazolin Sodium 2 gm/ Sodium (Chloride)  100 mls @ 100 mls/hr IVPB Q8 [facility]; 

Protocol


   Last Admin: 11/12/18 08:48 Dose:  100 mls/hr


Levetiracetam (Keppra)  500 mg PO BID [facility]


   Last Admin: 11/12/18 08:48 Dose:  500 mg











- Labs


Labs: 


                                        





                                 11/09/18 05:20 





                                 11/12/18 06:10 





                                        











PT  12.6 Seconds (9.8-13.1)   10/19/18  23:05    


 


INR  1.1   10/19/18  23:05    


 


APTT  36.2 Seconds (25.6-37.1)   10/19/18  23:05    














Attending/Attestation





- Attestation


I have personally seen and examined this patient.: Yes


I have fully participated in the care of the patient.: Yes


I have reviewed all pertinent clinical information, including history, physical 

exam and plan: Yes

## 2018-11-13 RX ADMIN — ENOXAPARIN SODIUM SCH MG: 40 INJECTION SUBCUTANEOUS at 08:26

## 2018-11-13 NOTE — CP.PCM.PN
<Jose Alejandro KapoorYahir - Last Filed: 11/13/18 11:58>





Subjective





- Date & Time of Evaluation


Date of Evaluation: 11/13/18


Time of Evaluation: 09:50





- Subjective


Subjective: 





Patient seen today at bedside, NAD, AAOx3, denies chest pain, SOB, cough, N/V/D,

reports feeling well, is afebrile, reports good appetite.


Patient admitted on 10/20/18 initially for seizures and alcohol withdrawal, 

patient was febrile and found with bacteremia on BC and Echo was done on 10/22 

showed vegetations on aortic valve, patient also has history of IV drug abuse, 

patient is receiving ancef 2g via PICC Q8h on day 23 today





Objective





- Vital Signs/Intake and Output


Vital Signs (last 24 hours): 


                                        











Temp Pulse Resp BP Pulse Ox


 


 98.2 F   49 L  19   156/60 H  97 


 


 11/13/18 07:59  11/13/18 07:59  11/13/18 07:59  11/13/18 07:59  11/13/18 07:59











- Medications


Medications: 


                               Current Medications





Amlodipine Besylate (Norvasc)  5 mg PO DAILY [facility]


   Last Admin: 11/13/18 08:26 Dose:  5 mg


Enoxaparin Sodium (Lovenox)  40 mg SC DAILY [facility]; Protocol


   Last Admin: 11/13/18 08:26 Dose:  40 mg


Cefazolin Sodium 2 gm/ Sodium (Chloride)  100 mls @ 100 mls/hr IVPB Q8 [facility]; 

Protocol


   Last Admin: 11/13/18 08:26 Dose:  100 mls/hr


Levetiracetam (Keppra)  500 mg PO BID [facility]


   Last Admin: 11/13/18 08:26 Dose:  500 mg











- Labs


Labs: 


                                        





                                 11/09/18 05:20 





                                 11/12/18 06:10 





                                        











PT  12.6 Seconds (9.8-13.1)   10/19/18  23:05    


 


INR  1.1   10/19/18  23:05    


 


APTT  36.2 Seconds (25.6-37.1)   10/19/18  23:05    














- Constitutional


Appears: No Acute Distress





- Head Exam


Head Exam: ATRAUMATIC, NORMOCEPHALIC





- Eye Exam


Eye Exam: EOMI





- ENT Exam


ENT Exam: Mucous Membranes Moist





- Neck Exam


Neck Exam: Full ROM





- Respiratory Exam


Respiratory Exam: Clear to Ausculation Bilateral





- Cardiovascular Exam


Cardiovascular Exam: RRR, +S1, +S2





- GI/Abdominal Exam


GI & Abdominal Exam: Soft.  absent: Tenderness





- Extremities Exam


Extremities Exam: Full ROM.  absent: Pedal Edema





- Neurological Exam


Neurological Exam: Alert, Awake, Oriented x3





- Psychiatric Exam


Psychiatric exam: Normal Affect, Normal Mood





- Skin


Skin Exam: Normal Color, Warm





Assessment and Plan





- Assessment and Plan (Free Text)


Assessment: 





Assessment: 


58M with hx of non compliance with meds, Alcohol abuse and seizure, brought to 

the ED with recurrent witnessed seizures, alcohol withdrawl and AMS. Patient was

also febrile on admission, and BC showed bacteremia + with MSSA, MARY showed 

aortic Valve vegetation and Ancef was started 2g IV Q8h via PICC on 10/22. 





Plan: 





1. Endocarditis Bacteremia 


-stable and afebrile


-MARY + for vegetation on AV 


-blood cx-staph aureus, repeat BC negative 


-Picc line in place on Right arm 


-ID consult- Dr. Dumas- pt can't be d/c home with picc due to risk from hx of 

IV drug abuse, as per ID 6 weeks Rx.


-Ancef 2g Q8hr Day #23


-Blood cxs normalized by day 5


-Cardiologist consult Dr. Barnes, recommendations appreciated


-F/u ID recommendations





2. Abnormal LFTs


-likely secondary to chronic Hep C +


-MELD score 7


-Hep C ab-reactive


-Hepatitis C- Genotype 1A


-Hep C RNA PCR- positive 


-AST 94, ALT 78 11/12, LFT are trending down


-Abd us-Diffuse increased liver echogenicity compatible with fatty or other 

liver pathology, no masses or dilated ducts 


-HIV neg


-Non immune Hep B, Hep A negative





3. Status Epilepticus due to non compliance of medication and most probably to 

Alcohol withdrawal.


-Resolved


-Neurology consult Dr Mari, recomendations appreciatted.


-EEG:Conclusion: Normal EEG (Awake, Drowsy, and Asleep)


-Keppra 500mg PO Q12H


-Seizure precaution 


-PT eval and Tx





4. Alcohol Withdrawal


-resolved 


-CIWA score 0, Alc level <10


-Discontinued Librium and Ativan


-Folic Acid and Thiamine PO





5.HTN


-Norvasc 5mg QD


-monitor BP





6.Hypokalemia


-resolved





7. DVT prophylaxis


-Lovenox 40mg





Code Status


-Full





<Anna Otero - Last Filed: 11/13/18 15:42>





Objective





- Vital Signs/Intake and Output


Vital Signs (last 24 hours): 


                                        











Temp Pulse Resp BP Pulse Ox


 


 98.2 F   49 L  19   156/60 H  97 


 


 11/13/18 07:59  11/13/18 07:59  11/13/18 07:59  11/13/18 07:59  11/13/18 07:59











- Medications


Medications: 


                               Current Medications





Amlodipine Besylate (Norvasc)  5 mg PO DAILY [facility]


   Last Admin: 11/13/18 08:26 Dose:  5 mg


Enoxaparin Sodium (Lovenox)  40 mg SC DAILY [facility]; Protocol


   Last Admin: 11/13/18 08:26 Dose:  40 mg


Cefazolin Sodium 2 gm/ Sodium (Chloride)  100 mls @ 100 mls/hr IVPB Q8 [facility]; 

Protocol


   Last Admin: 11/13/18 08:26 Dose:  100 mls/hr


Levetiracetam (Keppra)  500 mg PO BID [facility]


   Last Admin: 11/13/18 08:26 Dose:  500 mg











- Labs


Labs: 


                                        





                                 11/09/18 05:20 





                                 11/12/18 06:10 





                                        











PT  12.6 Seconds (9.8-13.1)   10/19/18  23:05    


 


INR  1.1   10/19/18  23:05    


 


APTT  36.2 Seconds (25.6-37.1)   10/19/18  23:05    














Attending/Attestation





- Attestation


I have personally seen and examined this patient.: Yes


I have fully participated in the care of the patient.: Yes


I have reviewed all pertinent clinical information, including history, physical 

exam and plan: Yes

## 2018-11-14 RX ADMIN — ENOXAPARIN SODIUM SCH MG: 40 INJECTION SUBCUTANEOUS at 10:13

## 2018-11-14 NOTE — CP.PCM.PN
Subjective





- Date & Time of Evaluation


Date of Evaluation: 11/14/18


Time of Evaluation: 09:25





- Subjective


Subjective: 





Patient seen this AM, NAD, AAOx3, denies chest pain, SOB, cough, N/V/D, reports 

feeling well, no fevers. Patient is hemodinamically stable, occasionally 

asymptomatic mild bradycardia noted at rest, patient feels well. 


Patient admitted on 10/20/18 initially for seizures and alcohol withdrawal, 

patient was febrile and found with bacteremia on BC and Echo was done on 10/22 

showed vegetations on aortic valve, patient also has history of IV drug abuse, 

patient is receiving ancef 2g via PICC Q8h on day 24 today








Objective





- Vital Signs/Intake and Output


Vital Signs (last 24 hours): 


                                        











Temp Pulse Resp BP Pulse Ox


 


 97.7 F   49 L  20   144/65   97 


 


 11/14/18 08:40  11/14/18 08:40  11/14/18 08:40  11/14/18 08:40  11/14/18 08:40











- Medications


Medications: 


                               Current Medications





Amlodipine Besylate (Norvasc)  5 mg PO DAILY [facility]


   Last Admin: 11/13/18 08:26 Dose:  5 mg


Enoxaparin Sodium (Lovenox)  40 mg SC DAILY [facility]; Protocol


   Last Admin: 11/13/18 08:26 Dose:  40 mg


Cefazolin Sodium 2 gm/ Sodium (Chloride)  100 mls @ 100 mls/hr IVPB Q8 [facility]; 

Protocol


   Last Admin: 11/14/18 00:53 Dose:  100 mls/hr


Levetiracetam (Keppra)  500 mg PO BID [facility]


   Last Admin: 11/13/18 17:45 Dose:  500 mg











- Labs


Labs: 


                                        





                                 11/09/18 05:20 





                                 11/12/18 06:10 





                                        











PT  12.6 Seconds (9.8-13.1)   10/19/18  23:05    


 


INR  1.1   10/19/18  23:05    


 


APTT  36.2 Seconds (25.6-37.1)   10/19/18  23:05    














- Additional Findings


Additional findings: 





- Constitutional


Appears: No Acute Distress





- Head Exam


Head Exam: ATRAUMATIC, NORMOCEPHALIC





- Eye Exam


Eye Exam: EOMI





- ENT Exam


ENT Exam: Mucous Membranes Moist





- Neck Exam


Neck Exam: Full ROM





- Respiratory Exam


Respiratory Exam: CTA b/l





- Cardiovascular Exam


Cardiovascular Exam: RRR, +S1, +S2





- GI/Abdominal Exam


GI & Abdominal Exam: Soft.  absent: Tenderness





- Extremities Exam


Extremities Exam: Full ROM.  absent: Pedal Edema





- Neurological Exam


Neurological Exam: Alert, Awake, Oriented x3





- Psychiatric Exam


Psychiatric exam: Normal Affect, Normal Mood





- Skin


Skin Exam: Normal Color, Warm





Assessment and Plan





- Assessment and Plan (Free Text)


Assessment: 





57 Y/O Male with hx of non compliance with meds, Alcohol abuse and seizure, 

brought to the ED with recurrent witnessed seizures, alcohol withdrawal and AMS.

Patient was also febrile on admission, and BC showed bacteremia + with MSSA, MARY

showed aortic Valve vegetation and Ancef was started 2g IV Q8h via PICC on 

10/22. 





Plan: 





1. Endocarditis Bacteremia 


-stable and afebrile


-MARY + for vegetation on AV 


-blood cx-staph aureus, repeat BC negative 


-Picc line in place on Right arm 


-ID consult- Dr. Dumas- pt can't be d/c home with picc due to risk from hx of 

IV drug abuse, as per ID 6 weeks Rx.


-Ancef 2g Q8hr Day #24


-Blood cxs normalized by day 5


-Cardiologist consult Dr. Barnes, recommendations appreciated


-F/u ID recommendations





2. Abnormal LFTs


-likely secondary to chronic Hep C +


-MELD score 7


-Hep C ab-reactive


-Hepatitis C- Genotype 1A


-Hep C RNA PCR- positive 


-AST 94, ALT 78 11/12, LFT are trending down


-Abd us-Diffuse increased liver echogenicity compatible with fatty or other 

liver pathology, no masses or dilated ducts 


-HIV neg


-Non immune Hep B, Hep A negative





3. Status Epilepticus due to non compliance of medication and most probably to 

Alcohol withdrawal.


-Resolved


-Neurology consult Dr Mari, recomendations appreciatted.


-EEG:Conclusion: Normal EEG (Awake, Drowsy, and Asleep)


-Keppra 500mg PO Q12H


-Seizure precaution 


-PT eval and Tx





4. Alcohol Withdrawal


-resolved 


-CIWA score 0, Alc level <10


-Discontinued Librium and Ativan


-Folic Acid and Thiamine PO





5.HTN


-Norvasc 5mg QD


-monitor BP





6.Hypokalemia


-resolved





7. DVT prophylaxis


-Lovenox 40mg





Code Status


-Full

## 2018-11-15 LAB
ALBUMIN SERPL-MCNC: 4.1 G/DL (ref 3.5–5)
ALBUMIN/GLOB SERPL: 0.9 {RATIO} (ref 1–2.1)
ALT SERPL-CCNC: 61 U/L (ref 21–72)
AST SERPL-CCNC: 84 U/L (ref 17–59)
BUN SERPL-MCNC: 16 MG/DL (ref 9–20)
CALCIUM SERPL-MCNC: 9.4 MG/DL (ref 8.4–10.2)
ERYTHROCYTE [DISTWIDTH] IN BLOOD BY AUTOMATED COUNT: 13.1 % (ref 11.5–14.5)
GFR NON-AFRICAN AMERICAN: > 60
HGB BLD-MCNC: 13.2 G/DL (ref 12–18)
MCH RBC QN AUTO: 29.1 PG (ref 27–31)
MCHC RBC AUTO-ENTMCNC: 31.4 G/DL (ref 33–37)
MCV RBC AUTO: 92.5 FL (ref 80–94)
PLATELET # BLD: 218 K/UL (ref 130–400)
RBC # BLD AUTO: 4.53 MIL/UL (ref 4.4–5.9)
WBC # BLD AUTO: 6.7 K/UL (ref 4.8–10.8)

## 2018-11-15 RX ADMIN — ENOXAPARIN SODIUM SCH MG: 40 INJECTION SUBCUTANEOUS at 10:25

## 2018-11-15 NOTE — CP.PCM.PN
Subjective





- Date & Time of Evaluation


Date of Evaluation: 11/15/18


Time of Evaluation: 09:00





- Subjective


Subjective: 





Patient seen this AM, NAD, denies chest pain, SOB, cough, N/V/D, reports feeling

well, afebrile. Patient reports good appetite.


Patient admitted on 10/20/18 initially for seizures and alcohol withdrawal, 

patient was febrile and found with bacteremia on BC and Echo was done on 10/22 

showed vegetations on aortic valve, patient also has history of IV drug abuse, 

patient is receiving ancef 2g via PICC Q8h on day 25 today








Objective





- Vital Signs/Intake and Output


Vital Signs (last 24 hours): 


                                        











Temp Pulse Resp BP Pulse Ox


 


 97.9 F   57 L  20   121/66   98 


 


 11/15/18 08:06  11/15/18 08:06  11/15/18 08:06  11/15/18 08:06  11/15/18 08:06











- Medications


Medications: 


                               Current Medications





Amlodipine Besylate (Norvasc)  5 mg PO DAILY [facility]


   Last Admin: 11/14/18 10:14 Dose:  5 mg


Enoxaparin Sodium (Lovenox)  40 mg SC DAILY [facility]; Protocol


   Last Admin: 11/14/18 10:13 Dose:  40 mg


Cefazolin Sodium 2 gm/ Sodium (Chloride)  100 mls @ 100 mls/hr IVPB Q8 [facility]; 

Protocol


   Last Admin: 11/15/18 01:16 Dose:  100 mls/hr


Levetiracetam (Keppra)  500 mg PO BID [facility]


   Last Admin: 11/14/18 18:13 Dose:  500 mg











- Labs


Labs: 


                                        





                                 11/15/18 06:10 





                                 11/15/18 06:10 





                                        











PT  12.6 Seconds (9.8-13.1)   10/19/18  23:05    


 


INR  1.1   10/19/18  23:05    


 


APTT  36.2 Seconds (25.6-37.1)   10/19/18  23:05    














- Constitutional


Appears: No Acute Distress





- Head Exam


Head Exam: ATRAUMATIC, NORMOCEPHALIC





- Eye Exam


Eye Exam: EOMI





- ENT Exam


ENT Exam: Mucous Membranes Moist





- Respiratory Exam


Respiratory Exam: Clear to Ausculation Bilateral





- Cardiovascular Exam


Cardiovascular Exam: RRR, +S1, +S2





- GI/Abdominal Exam


GI & Abdominal Exam: Soft.  absent: Tenderness





- Extremities Exam


Extremities Exam: Full ROM.  absent: Pedal Edema





- Neurological Exam


Neurological Exam: Alert, Awake, Oriented x3





- Psychiatric Exam


Psychiatric exam: Normal Affect, Normal Mood





- Skin


Skin Exam: Normal Color, Warm





Assessment and Plan





- Assessment and Plan (Free Text)


Assessment: 





59 Y/O Male with hx of non compliance with meds, Alcohol abuse and seizure, 

brought to the ED with recurrent witnessed seizures, alcohol withdrawal and AMS.

Patient was also febrile on admission, and BC showed bacteremia + with MSSA, MARY

showed aortic Valve vegetation and Ancef was started 2g IV Q8h via PICC on 

10/22. 





Plan: 





1. Endocarditis Bacteremia 


-stable and afebrile


-MARY + for vegetation on AV 


-blood cx-staph aureus, repeat BC negative 


-Picc line in place on Right arm 


-ID consult- Dr. Dumas- pt can't be d/c home with picc due to risk from hx of 

IV drug abuse, as per ID 6 weeks Rx.


-Ancef 2g Q8hr Day #25


-Blood cxs normalized by day 5


-Cardiologist consult Dr. Barnes, recommendations appreciated


-F/u ID recommendations





2. Abnormal LFTs


-Improving more likely 2/2 ETOH abuse vs chronic Hep C +


-MELD score 7


-Hep C ab-reactive


-Hepatitis C- Genotype 1A


-Hep C RNA PCR- positive 


-AST 84, ALT 61 11/15, LFT are trending down


-Abd us-Diffuse increased liver echogenicity compatible with fatty or other 

liver pathology, no masses or dilated ducts 


-HIV neg


-Non immune Hep B, Hep A negative





3. Status Epilepticus due to non compliance of medication and most probably to 

Alcohol withdrawal.


-Resolved


-Neurology consult Dr Mari, recomendations appreciatted.


-EEG:Conclusion: Normal EEG (Awake, Drowsy, and Asleep)


-Keppra 500mg PO Q12H


-Seizure precaution 


-PT eval and Tx





4. Alcohol Withdrawal


-resolved 


-CIWA score 0, Alc level <10


-Discontinued Librium and Ativan


-Folic Acid and Thiamine PO





5.HTN


-Norvasc 5mg QD


-monitor BP





6.Hypokalemia


-resolved





7. DVT prophylaxis


-Lovenox 40mg





Code Status


-Full

## 2018-11-16 RX ADMIN — ENOXAPARIN SODIUM SCH MG: 40 INJECTION SUBCUTANEOUS at 10:14

## 2018-11-16 NOTE — CP.PCM.PN
<Flavio Lugo - Last Filed: 11/16/18 12:20>





Subjective





- Date & Time of Evaluation


Date of Evaluation: 11/16/18


Time of Evaluation: 07:20





- Subjective


Subjective: 





Patient is seen and examined at bedside. No acute event overnight. Patient have 

no complain, but he was not able to sleep well, as he was waking up every 3 

hour. Otherwise patient had no dizziness, shacking feeling, LOC, headache, chest

pain, sob, abdominal pain, diarrhea, constipation, dysuria or polyuria. 





Objective





- Vital Signs/Intake and Output


Vital Signs (last 24 hours): 


                                        











Temp Pulse Resp BP Pulse Ox


 


 98.2 F   47 L  19   122/66   95 


 


 11/16/18 08:12  11/16/18 08:12  11/16/18 08:12  11/16/18 08:12  11/16/18 08:12











- Medications


Medications: 


                               Current Medications





Amlodipine Besylate (Norvasc)  5 mg PO DAILY [facility]


   Last Admin: 11/16/18 10:14 Dose:  5 mg


Enoxaparin Sodium (Lovenox)  40 mg SC DAILY [facility]; Protocol


   Last Admin: 11/16/18 10:14 Dose:  40 mg


Cefazolin Sodium 2 gm/ Sodium (Chloride)  100 mls @ 100 mls/hr IVPB Q8 [facility]; 

Protocol


   Last Admin: 11/16/18 10:26 Dose:  100 mls/hr


Levetiracetam (Keppra)  500 mg PO BID [facility]


   Last Admin: 11/16/18 10:14 Dose:  500 mg











- Labs


Labs: 


                                        





                                 11/15/18 06:10 





                                 11/15/18 06:10 





                                        











PT  12.6 Seconds (9.8-13.1)   10/19/18  23:05    


 


INR  1.1   10/19/18  23:05    


 


APTT  36.2 Seconds (25.6-37.1)   10/19/18  23:05    














- Constitutional


Appears: Well, Non-toxic





- Head Exam


Head Exam: ATRAUMATIC, NORMAL INSPECTION, NORMOCEPHALIC





- Eye Exam


Eye Exam: EOMI, Normal appearance, PERRL


Pupil Exam: NORMAL ACCOMODATION, PERRL





- ENT Exam


ENT Exam: Mucous Membranes Moist, Normal Exam





- Neck Exam


Neck Exam: Full ROM, Normal Inspection





- Respiratory Exam


Respiratory Exam: Clear to Ausculation Bilateral, NORMAL BREATHING PATTERN





- Cardiovascular Exam


Cardiovascular Exam: +S1, +S2





- GI/Abdominal Exam


GI & Abdominal Exam: Soft, Normal Bowel Sounds





- Extremities Exam


Extremities Exam: Full ROM, Normal Capillary Refill, Normal Inspection





- Back Exam


Back Exam: NORMAL INSPECTION





- Neurological Exam


Neurological Exam: Alert, Awake, Oriented x3





- Psychiatric Exam


Psychiatric exam: Normal Affect, Normal Mood





- Skin


Skin Exam: Dry, Intact, Normal Color, Warm





Assessment and Plan





- Assessment and Plan (Free Text)


Assessment: 


59 yo male with PMH of non complaince with medication, alcohol abuse and se

izures, brought to ED with recurrent seizures, Alcohol withdrawal and AMS. 

Patient was afebrile on admission, blood culture + bacteremia with MSSA, MARY 

showed Aortic Valve vegetation and Ancef was started 2g IV Q8hj via PICC on 

10/22.





Plan: 





1. Endocarditis Bacteremia 


-stable and afebrile >24


-MARY + for vegetation on AV 


-Blood Culture Negative Final


-Picc line in place on Right arm 


-Will continue ABx by PICC line in Hospital as ID Dr. Dumas- State patient may 

use PICC line for IV abuse. 


-Ancef 2g Q8hr Day #26


-Cardiologist consult Dr. Barnes, recommendations appreciated





2. Abnormal LFTs


-Improved, was abnormal due to ETOH vs Chronic Hep C+ 


-MELD score 7


-Hep C ab-reactive


-Hepatitis C- Genotype 1A


-Hep C RNA PCR- positive 


-AST 84, ALT 61 11/15, LFT are trending down


-Abd us-Diffuse increased liver echogenicity compatible with fatty or other 

liver pathology, no masses or dilated ducts 


-HIV neg


-Non immune Hep B, Hep A negative





3. Status Epilepticus due to non compliance of medication and most probably to 

Alcohol withdrawal.


-Resolved


-Neurology consult Dr aMri, recomendations appreciatted.


-EEG:Conclusion: Normal EEG 


-Keppra 500mg PO Q12H


-Seizure precaution 


-PT eval and Tx





4. Alcohol Withdrawal


-resolved 


-CIWA score 0, Alc level <10


-Discontinued Librium and Ativan


-Folic Acid and Thiamine PO





5.HTN


-Norvasc 5mg QD


-monitor BP





6.  DVT prophylaxis


-Lovenox 40mg





Code Status


-Full








<Jazmín Campos - Last Filed: 11/17/18 09:54>





Objective





- Vital Signs/Intake and Output


Vital Signs (last 24 hours): 


                                        











Temp Pulse Resp BP Pulse Ox


 


 97.8 F   45 L  19   129/62   98 


 


 11/17/18 07:56  11/17/18 08:16  11/17/18 07:56  11/17/18 07:56  11/17/18 07:56











- Medications


Medications: 


                               Current Medications





Amlodipine Besylate (Norvasc)  5 mg PO DAILY [facility]


   Last Admin: 11/16/18 10:14 Dose:  5 mg


Enoxaparin Sodium (Lovenox)  40 mg SC DAILY [facility]; Protocol


   Last Admin: 11/16/18 10:14 Dose:  40 mg


Cefazolin Sodium 2 gm/ Sodium (Chloride)  100 mls @ 100 mls/hr IVPB Q8 [facility]; 

Protocol


   Last Admin: 11/17/18 00:27 Dose:  100 mls/hr


Levetiracetam (Keppra)  500 mg PO BID [facility]


   Last Admin: 11/16/18 16:39 Dose:  500 mg











- Labs


Labs: 


                                        





                                 11/15/18 06:10 





                                 11/15/18 06:10 





                                        











PT  12.6 Seconds (9.8-13.1)   10/19/18  23:05    


 


INR  1.1   10/19/18  23:05    


 


APTT  36.2 Seconds (25.6-37.1)   10/19/18  23:05    














Attending/Attestation





- Attestation


I have personally seen and examined this patient.: Yes


I have fully participated in the care of the patient.: Yes


I have reviewed all pertinent clinical information, including history, physical 

exam and plan: Yes


Notes (Text): 





11/17/18 09:53


Seen, examined, and discussed with resident. Agree with findings and plan as 

above.

## 2018-11-17 RX ADMIN — ENOXAPARIN SODIUM SCH MG: 40 INJECTION SUBCUTANEOUS at 09:53

## 2018-11-17 NOTE — CP.PCM.PN
<Priyank Pereira - Last Filed: 11/17/18 10:36>





Subjective





- Date & Time of Evaluation


Date of Evaluation: 11/17/18


Time of Evaluation: 08:00





- Subjective


Subjective: 








Pt seen and examined this morning. Seen eating breakfast. No complaints. Denies 

anxiety, tremors. CIWA 0.








Objective





- Vital Signs/Intake and Output


Vital Signs (last 24 hours): 


                                        











Temp Pulse Resp BP Pulse Ox


 


 97.8 F   45 L  19   129/62   98 


 


 11/17/18 07:56  11/17/18 08:16  11/17/18 07:56  11/17/18 07:56  11/17/18 07:56











- Medications


Medications: 


                               Current Medications





Amlodipine Besylate (Norvasc)  5 mg PO DAILY [facility]


   Last Admin: 11/17/18 10:03 Dose:  5 mg


Enoxaparin Sodium (Lovenox)  40 mg SC DAILY [facility]; Protocol


   Last Admin: 11/17/18 09:53 Dose:  40 mg


Cefazolin Sodium 2 gm/ Sodium (Chloride)  100 mls @ 100 mls/hr IVPB Q8 [facility]; 

Protocol


   Last Admin: 11/17/18 10:02 Dose:  100 mls/hr


Levetiracetam (Keppra)  500 mg PO BID [facility]


   Last Admin: 11/17/18 09:52 Dose:  500 mg











- Labs


Labs: 


                                        





                                 11/15/18 06:10 





                                 11/15/18 06:10 





                                        











PT  12.6 Seconds (9.8-13.1)   10/19/18  23:05    


 


INR  1.1   10/19/18  23:05    


 


APTT  36.2 Seconds (25.6-37.1)   10/19/18  23:05    














- Constitutional


Appears: Well, Non-toxic, No Acute Distress





- Head Exam


Head Exam: NORMAL INSPECTION





- Eye Exam


Eye Exam: Normal appearance





- ENT Exam


ENT Exam: Mucous Membranes Moist





- Neck Exam


Neck Exam: Full ROM





- Respiratory Exam


Respiratory Exam: Clear to Ausculation Bilateral.  absent: Rales, Wheezes





- Cardiovascular Exam


Cardiovascular Exam: REGULAR RHYTHM, +S1, +S2





- GI/Abdominal Exam


GI & Abdominal Exam: Soft, Normal Bowel Sounds.  absent: Tenderness





- Extremities Exam


Extremities Exam: Normal Inspection





- Neurological Exam


Neurological Exam: Alert, Oriented x3





- Psychiatric Exam


Psychiatric exam: Normal Affect.  absent: Agitated, Anxious





- Skin


Skin Exam: Normal Color





Assessment and Plan





- Assessment and Plan (Free Text)


Assessment: 


57 yo male with PMH of non compliance with medication, alcohol abuse and 

seizures, brought to ED with recurrent seizures, Alcohol withdrawal and AMS. 

Patient was afebrile on admission, blood culture + bacteremia with MSSA, MARY 

showed Aortic Valve vegetation and Ancef was started 2g IV Q8hj via PICC on 

10/22.





Plan: 





1. Endocarditis Bacteremia 


-stable and afebrile >24


-MARY + for vegetation on AV 


-Blood Culture Negative Final


-Picc line in place on Right arm 


-Will continue ABx by PICC line in Hospital as ID Dr. Dumas- State patient may 

use PICC line for IV abuse. 


-Ancef 2g Q8hr Day #27


-Cardiologist consult Dr. Barnes, recommendations appreciated





2. Abnormal LFTs


-Improved, was abnormal due to ETOH vs Chronic Hep C+ 


-MELD score 7


-Hep C ab-reactive


-Hepatitis C- Genotype 1A


-Hep C RNA PCR- positive 


-AST 84, ALT 61 11/15, LFT are trending down


-Abd us-Diffuse increased liver echogenicity compatible with fatty or other 

liver pathology, no masses or dilated ducts 


-HIV neg


-Non immune Hep B, Hep A negative





3. Status Epilepticus due to non compliance of medication and most probably to 

Alcohol withdrawal.


-Resolved


-Neurology consult Dr Mari, recomendations appreciatted.


-EEG:Conclusion: Normal EEG 


-Keppra 500mg PO Q12H


-Seizure precaution 


-PT eval and Tx





4. Alcohol Withdrawal


-resolved 


-CIWA score 0, Alc level <10


-Discontinued Librium and Ativan


-Folic Acid and Thiamine PO





5.HTN


-Norvasc 5mg QD


-monitor BP





6.  DVT prophylaxis


-Lovenox 40mg





Code Status


-Full














<Jazmín Campos - Last Filed: 11/17/18 10:57>





Objective





- Vital Signs/Intake and Output


Vital Signs (last 24 hours): 


                                        











Temp Pulse Resp BP Pulse Ox


 


 97.8 F   45 L  19   129/62   98 


 


 11/17/18 07:56  11/17/18 08:16  11/17/18 07:56  11/17/18 07:56  11/17/18 07:56











- Medications


Medications: 


                               Current Medications





Amlodipine Besylate (Norvasc)  5 mg PO DAILY [facility]


   Last Admin: 11/17/18 10:03 Dose:  5 mg


Enoxaparin Sodium (Lovenox)  40 mg SC DAILY [facility]; Protocol


   Last Admin: 11/17/18 09:53 Dose:  40 mg


Cefazolin Sodium 2 gm/ Sodium (Chloride)  100 mls @ 100 mls/hr IVPB Q8 [facility]; 

Protocol


   Last Admin: 11/17/18 10:02 Dose:  100 mls/hr


Levetiracetam (Keppra)  500 mg PO BID [facility]


   Last Admin: 11/17/18 09:52 Dose:  500 mg











- Labs


Labs: 


                                        





                                 11/15/18 06:10 





                                 11/15/18 06:10 





                                        











PT  12.6 Seconds (9.8-13.1)   10/19/18  23:05    


 


INR  1.1   10/19/18  23:05    


 


APTT  36.2 Seconds (25.6-37.1)   10/19/18  23:05    














Attending/Attestation





- Attestation


I have personally seen and examined this patient.: Yes


I have fully participated in the care of the patient.: Yes


I have reviewed all pertinent clinical information, including history, physical 

exam and plan: Yes


Notes (Text): 





11/17/18 10:57


Seen, examined, and discussed with resident. Agree with findings and plan as 

above.

## 2018-11-18 RX ADMIN — ENOXAPARIN SODIUM SCH MG: 40 INJECTION SUBCUTANEOUS at 08:36

## 2018-11-18 NOTE — CP.PCM.PN
<Flavio Lugo - Last Filed: 11/18/18 15:32>





Subjective





- Date & Time of Evaluation


Date of Evaluation: 11/18/18


Time of Evaluation: 07:00





- Subjective


Subjective: 





Pt is seen and examined at bedside. he was eating his breakfast. Patient had no 

acute event overnight. Slept well, no fever, chills, seizures, dizziness, 

confusion, chest pain, sob, or any other symptoms.





Objective





- Vital Signs/Intake and Output


Vital Signs (last 24 hours): 


                                        











Temp Pulse Resp BP Pulse Ox


 


 97.9 F   60   20   124/69   96 


 


 11/18/18 00:40  11/18/18 00:40  11/18/18 00:40  11/18/18 00:40  11/18/18 00:40











- Medications


Medications: 


                               Current Medications





Amlodipine Besylate (Norvasc)  5 mg PO DAILY [facility]


   Last Admin: 11/17/18 10:03 Dose:  5 mg


Enoxaparin Sodium (Lovenox)  40 mg SC DAILY [facility]; Protocol


Cefazolin Sodium 2 gm/ Sodium (Chloride)  100 mls @ 100 mls/hr IVPB Q8 [facility]; 

Protocol


   Last Admin: 11/18/18 00:51 Dose:  100 mls/hr


Levetiracetam (Keppra)  500 mg PO BID [facility]


   Last Admin: 11/17/18 17:15 Dose:  500 mg











- Labs


Labs: 


                                        





                                 11/15/18 06:10 





                                 11/15/18 06:10 





                                        











PT  12.6 Seconds (9.8-13.1)   10/19/18  23:05    


 


INR  1.1   10/19/18  23:05    


 


APTT  36.2 Seconds (25.6-37.1)   10/19/18  23:05    














- Constitutional


Appears: Well, Non-toxic, No Acute Distress





- Head Exam


Head Exam: ATRAUMATIC, NORMAL INSPECTION, NORMOCEPHALIC





- Eye Exam


Eye Exam: EOMI, Normal appearance, PERRL


Pupil Exam: NORMAL ACCOMODATION, PERRL





- ENT Exam


ENT Exam: Mucous Membranes Moist, Normal Exam





- Neck Exam


Neck Exam: Full ROM, Normal Inspection





- Respiratory Exam


Respiratory Exam: Clear to Ausculation Bilateral, NORMAL BREATHING PATTERN





- Cardiovascular Exam


Cardiovascular Exam: REGULAR RHYTHM, +S1, +S2





- GI/Abdominal Exam


GI & Abdominal Exam: Soft, Normal Bowel Sounds





- Extremities Exam


Extremities Exam: Full ROM, Normal Capillary Refill, Normal Inspection





- Back Exam


Back Exam: NORMAL INSPECTION





- Neurological Exam


Neurological Exam: Alert, Awake, Oriented x3





- Psychiatric Exam


Psychiatric exam: Normal Affect, Normal Mood





- Skin


Skin Exam: Dry, Intact, Normal Color, Warm





Assessment and Plan





- Assessment and Plan (Free Text)


Assessment: 


57 yo male with PMH of non compliance with medication, alcohol abuse and 

seizures, brought to ED with recurrent seizures, Alcohol withdrawal and AMS. 

Patient was afebrile on admission, blood culture + bacteremia with MSSA, MARY 

showed Aortic Valve vegetation and is on Ancef 2g IV Q8hj via PICC since 10/22.





Plan: 





1. Endocarditis Bacteremia 


-stable and afebrile >24


-MARY + for vegetation on AV 


-Blood Culture Negative Final


-Picc line in place on Right arm 


-Will continue ABx by PICC line in Hospital as ID Dr. Dumas- State patient may 

use PICC line for IV abuse. 


-Ancef 2g Q8hr Day #28


-Cardiologist consult Dr. Barnes, recommendations appreciated





2.HTN


-Blood pressure 159/61


-Continue Norvasc 5mg QD


-Follow up BP





3. Abnormal LFTs


-Improved, was abnormal due to ETOH vs Chronic Hep C+ 


-MELD score 7


-Hep C ab-reactive


-Hepatitis C- Genotype 1A


-Hep C RNA PCR- positive 


-AST 84, ALT 61 11/15, LFT are trending down


-Abd us-Diffuse increased liver echogenicity compatible with fatty or other 

liver pathology, no masses or dilated ducts 


-HIV neg


-Non immune Hep B, Hep A negative





4. Status Epilepticus due to non compliance of medication and most probably to 

Alcohol withdrawal.


-Resolved


-Neurology consult Dr Mari, recomendations appreciatted.


-EEG:Conclusion: Normal EEG 


-Keppra 500mg PO Q12H


-Seizure precaution 


-PT eval and Tx





5. Alcohol Withdrawal


-resolved 


-CIWA score 0, Alc level <10


-Discontinued Librium and Ativan


-Folic Acid and Thiamine PO





6.  DVT prophylaxis


-Lovenox 40mg





Code Status


-Full











<Vidhya Hanson - Last Filed: 11/18/18 15:54>





Objective





- Vital Signs/Intake and Output


Vital Signs (last 24 hours): 


                                        











Temp Pulse Resp BP Pulse Ox


 


 98.4 F   73   20   159/61 H  98 


 


 11/18/18 08:27  11/18/18 08:36  11/18/18 08:27  11/18/18 08:36  11/18/18 08:27











- Medications


Medications: 


                               Current Medications





Amlodipine Besylate (Norvasc)  5 mg PO DAILY [facility]


   Last Admin: 11/18/18 08:36 Dose:  5 mg


Enoxaparin Sodium (Lovenox)  40 mg SC DAILY [facility]; Protocol


   Last Admin: 11/18/18 08:36 Dose:  40 mg


Cefazolin Sodium 2 gm/ Sodium (Chloride)  100 mls @ 100 mls/hr IVPB Q8 [facility]; 

Protocol


   Last Admin: 11/18/18 08:37 Dose:  100 mls/hr


Levetiracetam (Keppra)  500 mg PO BID [facility]


   Last Admin: 11/18/18 08:37 Dose:  500 mg











- Labs


Labs: 


                                        





                                 11/15/18 06:10 





                                 11/15/18 06:10 





                                        











PT  12.6 Seconds (9.8-13.1)   10/19/18  23:05    


 


INR  1.1   10/19/18  23:05    


 


APTT  36.2 Seconds (25.6-37.1)   10/19/18  23:05    














<Anna Otero - Last Filed: 11/18/18 16:55>





Objective





- Vital Signs/Intake and Output


Vital Signs (last 24 hours): 


                                        











Temp Pulse Resp BP Pulse Ox


 


 97.6 F   60   20   117/64   96 


 


 11/18/18 16:41  11/18/18 16:41  11/18/18 16:41  11/18/18 16:41  11/18/18 16:41











- Medications


Medications: 


                               Current Medications





Amlodipine Besylate (Norvasc)  5 mg PO DAILY [facility]


   Last Admin: 11/18/18 08:36 Dose:  5 mg


Enoxaparin Sodium (Lovenox)  40 mg SC DAILY [facility]; Protocol


   Last Admin: 11/18/18 08:36 Dose:  40 mg


Cefazolin Sodium 2 gm/ Sodium (Chloride)  100 mls @ 100 mls/hr IVPB Q8 [facility]; 

Protocol


   Last Admin: 11/18/18 16:15 Dose:  100 mls/hr


Levetiracetam (Keppra)  500 mg PO BID [facility]


   Last Admin: 11/18/18 16:15 Dose:  500 mg











- Labs


Labs: 


                                        





                                 11/15/18 06:10 





                                 11/15/18 06:10 





                                        











PT  12.6 Seconds (9.8-13.1)   10/19/18  23:05    


 


INR  1.1   10/19/18  23:05    


 


APTT  36.2 Seconds (25.6-37.1)   10/19/18  23:05

## 2018-11-19 LAB
ALBUMIN SERPL-MCNC: 3.8 G/DL (ref 3.5–5)
ALBUMIN/GLOB SERPL: 0.9 {RATIO} (ref 1–2.1)
ALT SERPL-CCNC: 48 U/L (ref 21–72)
AST SERPL-CCNC: 76 U/L (ref 17–59)
BUN SERPL-MCNC: 11 MG/DL (ref 9–20)
CALCIUM SERPL-MCNC: 9.3 MG/DL (ref 8.4–10.2)
ERYTHROCYTE [DISTWIDTH] IN BLOOD BY AUTOMATED COUNT: 13.3 % (ref 11.5–14.5)
GFR NON-AFRICAN AMERICAN: > 60
HGB BLD-MCNC: 12.9 G/DL (ref 12–18)
MCH RBC QN AUTO: 28.2 PG (ref 27–31)
MCHC RBC AUTO-ENTMCNC: 31.4 G/DL (ref 33–37)
MCV RBC AUTO: 89.8 FL (ref 80–94)
PLATELET # BLD: 176 K/UL (ref 130–400)
RBC # BLD AUTO: 4.58 MIL/UL (ref 4.4–5.9)
WBC # BLD AUTO: 5.1 K/UL (ref 4.8–10.8)

## 2018-11-19 RX ADMIN — Medication SCH CAP: at 17:32

## 2018-11-19 RX ADMIN — Medication SCH CAP: at 09:16

## 2018-11-19 RX ADMIN — ENOXAPARIN SODIUM SCH MG: 40 INJECTION SUBCUTANEOUS at 09:16

## 2018-11-19 NOTE — CP.PCM.PN
Subjective





- Date & Time of Evaluation


Date of Evaluation: 11/19/18


Time of Evaluation: 09:37





- Subjective


Subjective: 





Patient is seen and examined at bedside. No acute event overnight. Patient have 

no complains today. He was eating his breakfast. Patient denies any fever, 

chills, dizziness, chest pain, sob, abd pain, or any other symptoms





Objective





- Vital Signs/Intake and Output


Vital Signs (last 24 hours): 


                                        











Temp Pulse Resp BP Pulse Ox


 


 98.8 F   68   20   124/63   96 


 


 11/19/18 07:55  11/19/18 07:55  11/19/18 07:55  11/19/18 07:55  11/19/18 07:55











- Medications


Medications: 


                               Current Medications





Amlodipine Besylate (Norvasc)  5 mg PO DAILY [facility]


   Last Admin: 11/19/18 09:16 Dose:  5 mg


Enoxaparin Sodium (Lovenox)  40 mg SC DAILY [facility]; Protocol


   Last Admin: 11/19/18 09:16 Dose:  40 mg


Cefazolin Sodium 2 gm/ Sodium (Chloride)  100 mls @ 100 mls/hr IVPB Q8 [facility]; 

Protocol


   Last Admin: 11/19/18 09:16 Dose:  100 mls/hr


Lactobacillus Acidophilus (Bacid Acidophilus)  1 cap PO BID [facility]


   Last Admin: 11/19/18 09:16 Dose:  1 cap


Levetiracetam (Keppra)  500 mg PO BID [facility]


   Last Admin: 11/19/18 09:16 Dose:  500 mg











- Labs


Labs: 


                                        





                                 11/19/18 06:00 





                                 11/19/18 06:00 





                                        











PT  12.6 Seconds (9.8-13.1)   10/19/18  23:05    


 


INR  1.1   10/19/18  23:05    


 


APTT  36.2 Seconds (25.6-37.1)   10/19/18  23:05    














- Constitutional


Appears: Well, Non-toxic, No Acute Distress





- Head Exam


Head Exam: ATRAUMATIC, NORMAL INSPECTION, NORMOCEPHALIC





- Eye Exam


Eye Exam: EOMI, Normal appearance, PERRL


Pupil Exam: NORMAL ACCOMODATION, PERRL





- ENT Exam


ENT Exam: Mucous Membranes Moist, Normal Exam





- Neck Exam


Neck Exam: Full ROM, Normal Inspection





- Respiratory Exam


Respiratory Exam: Clear to Ausculation Bilateral, NORMAL BREATHING PATTERN





- Cardiovascular Exam


Cardiovascular Exam: REGULAR RHYTHM





- GI/Abdominal Exam


GI & Abdominal Exam: Soft, Normal Bowel Sounds





- Extremities Exam


Extremities Exam: Full ROM, Normal Capillary Refill, Normal Inspection





- Back Exam


Back Exam: NORMAL INSPECTION





- Neurological Exam


Neurological Exam: Alert, Awake





- Psychiatric Exam


Psychiatric exam: Normal Affect, Normal Mood





- Skin


Skin Exam: Dry, Intact, Normal Color, Warm





Assessment and Plan





- Assessment and Plan (Free Text)


Assessment: 


This is a 58 history of alcohol abuse with seizures, non-compliance to 

medication is brought to ED with recurrent seizures, Alcohol withdrawal and AMS.

Patient was afebrile on admission, blood culture + bacteremia with MSSA, MARY 

showed Aortic Valve vegetation and is on Ancef 2g IV Q8hj via PICC since 10/22.





Plan: 





1. Endocarditis Bacteremia 


-Stable


-MARY + for vegetation on AV 


-Blood Culture Negative Final


-Picc line in place on Right arm 


-Will continue ABx by PICC line in Hospital as ID Dr. Dumas- State patient may 

use PICC line for IV abuse. 


-Ancef 2g Q8hr Day #29





2.HTN


-Blood pressure controlled


-Continue Norvasc 5mg QD


- Monitor BP





3. Abnormal LFTs


-Improved, was abnormal due to ETOH vs Chronic Hep C+ 


-MELD score 7


-Hep C ab-reactive


-Hepatitis C- Genotype 1A


-Hep C RNA PCR- positive 


-AST 84, ALT 61 11/15, LFT are trending down


-Abd us-Diffuse increased liver echogenicity compatible with fatty or other 

liver pathology, no masses or dilated ducts 


-HIV neg


-Non immune Hep B, Hep A negative





4. Status Epilepticus due to non compliance of medication and most probably to 

Alcohol withdrawal.


-Resolved


-Neurology consult Dr Mari, recomendations appreciatted.


-EEG:Conclusion: Normal EEG 


-Keppra 500mg PO Q12H


-Seizure precaution 


-PT eval and Tx





5. Alcohol Withdrawal


-resolved 


-CIWA score 0, Alc level <10


-Discontinued Librium and Ativan


-Folic Acid and Thiamine PO





6.  DVT prophylaxis


-Lovenox 40mg





Code Status


-Full

## 2018-11-20 RX ADMIN — Medication SCH CAP: at 17:33

## 2018-11-20 RX ADMIN — ENOXAPARIN SODIUM SCH MG: 40 INJECTION SUBCUTANEOUS at 09:30

## 2018-11-20 RX ADMIN — Medication SCH CAP: at 09:30

## 2018-11-20 NOTE — CP.PCM.PN
<Flavio Lugo - Last Filed: 11/20/18 10:36>





Subjective





- Date & Time of Evaluation


Date of Evaluation: 11/20/18


Time of Evaluation: 07:00





- Subjective


Subjective: 





Patient seen and examined, no acute event overnight. He is eating his breakfast 

with no complain. Denies chest pain, sob, diarrhea, constipation, dysuria, 

polyuria. 





Objective





- Vital Signs/Intake and Output


Vital Signs (last 24 hours): 


                                        











Temp Pulse Resp BP Pulse Ox


 


 98.2 F   64   20   130/62   98 


 


 11/20/18 07:49  11/20/18 07:49  11/20/18 07:49  11/20/18 07:49  11/20/18 07:49











- Medications


Medications: 


                               Current Medications





Amlodipine Besylate (Norvasc)  5 mg PO DAILY [facility]


   Last Admin: 11/19/18 09:16 Dose:  5 mg


Enoxaparin Sodium (Lovenox)  40 mg SC DAILY [facility]; Protocol


   Last Admin: 11/19/18 09:16 Dose:  40 mg


Cefazolin Sodium 2 gm/ Sodium (Chloride)  100 mls @ 100 mls/hr IVPB Q8 [facility]; 

Protocol


   Last Admin: 11/20/18 01:07 Dose:  100 mls/hr


Lactobacillus Acidophilus (Bacid Acidophilus)  1 cap PO BID [facility]


   Last Admin: 11/19/18 17:32 Dose:  1 cap


Levetiracetam (Keppra)  500 mg PO BID [facility]


   Last Admin: 11/19/18 17:32 Dose:  500 mg











- Labs


Labs: 


                                        





                                 11/19/18 06:00 





                                 11/19/18 06:00 





                                        











PT  12.6 Seconds (9.8-13.1)   10/19/18  23:05    


 


INR  1.1   10/19/18  23:05    


 


APTT  36.2 Seconds (25.6-37.1)   10/19/18  23:05    














- Constitutional


Appears: Well, Non-toxic, No Acute Distress





- Head Exam


Head Exam: ATRAUMATIC, NORMAL INSPECTION, NORMOCEPHALIC





- Eye Exam


Eye Exam: EOMI, Normal appearance, PERRL


Pupil Exam: NORMAL ACCOMODATION, PERRL





- ENT Exam


ENT Exam: Mucous Membranes Moist, Normal Exam





- Neck Exam


Neck Exam: Full ROM, Normal Inspection





- Respiratory Exam


Respiratory Exam: Clear to Ausculation Bilateral, NORMAL BREATHING PATTERN





- Cardiovascular Exam


Cardiovascular Exam: REGULAR RHYTHM, +S1, +S2





- GI/Abdominal Exam


GI & Abdominal Exam: Soft, Normal Bowel Sounds





- Extremities Exam


Extremities Exam: Full ROM, Normal Capillary Refill, Normal Inspection





- Back Exam


Back Exam: NORMAL INSPECTION





- Neurological Exam


Neurological Exam: Alert, Awake, Oriented x3





- Psychiatric Exam


Psychiatric exam: Normal Affect, Normal Mood





- Skin


Skin Exam: Dry, Intact, Normal Color, Warm





Assessment and Plan





- Assessment and Plan (Free Text)


Assessment: 





Assessment: 


This is a 58 history of alcohol abuse with seizures, non-compliance to 

medication is brought to ED with recurrent seizures, Alcohol withdrawal and AMS.

Patient was afebrile on admission, blood culture + bacteremia with MSSA, MARY 

showed Aortic Valve vegetation and is on Ancef 2g IV Q8hj via PICC since 10/22.





* Patient is being treated for Endocarditis bacteremia with Abx for 6 wk 


Ancef 2g Q8hr day 30#


Plan: 





1. Endocarditis Bacteremia 


-Stable


-MARY + for vegetation on AV 


-Blood Culture Negative Final


-Picc line in place on Right arm 


-Will continue ABx by PICC line in Hospital as ID Dr. Dumas- State patient may 

use PICC line for IV abuse. 


-Ancef 2g Q8hr Day #30





2.HTN


-Blood pressure controlled


-Continue Norvasc 5mg QD


- Monitor BP





3. Abnormal LFTs


-Improved, was abnormal due to ETOH vs Chronic Hep C+ 


-MELD score 7


-Hep C ab-reactive


-Hepatitis C- Genotype 1A


-Hep C RNA PCR- positive 


-AST 84, ALT 61 11/15, LFT are trending down


-Abd us-Diffuse increased liver echogenicity compatible with fatty or other 

liver pathology, no masses or dilated ducts 


-HIV neg


-Non immune Hep B, Hep A negative





4. Status Epilepticus due to non compliance of medication and most probably to 

Alcohol withdrawal.


-Resolved


-Neurology consult Dr Mari, recomendations appreciatted.


-EEG:Conclusion: Normal EEG 


-Keppra 500mg PO Q12H


-Seizure precaution 


-PT eval and Tx





5. Alcohol Withdrawal


-resolved 


-CIWA score 0, Alc level <10


-Discontinued Librium and Ativan


-Folic Acid and Thiamine PO





6.  DVT prophylaxis


-Lovenox 40mg





Code Status


-Full








<Jazmín Campos - Last Filed: 11/20/18 17:20>





Objective





- Vital Signs/Intake and Output


Vital Signs (last 24 hours): 


                                        











Temp Pulse Resp BP Pulse Ox


 


 97.4 F L  51 L  18   138/69   99 


 


 11/20/18 16:20  11/20/18 16:20  11/20/18 16:20  11/20/18 16:20  11/20/18 16:20











- Medications


Medications: 


                               Current Medications





Amlodipine Besylate (Norvasc)  5 mg PO DAILY [facility]


   Last Admin: 11/20/18 09:30 Dose:  5 mg


Enoxaparin Sodium (Lovenox)  40 mg SC DAILY [facility]; Protocol


   Last Admin: 11/20/18 09:30 Dose:  40 mg


Cefazolin Sodium 2 gm/ Sodium (Chloride)  100 mls @ 100 mls/hr IVPB Q8 [facility]; 

Protocol


   Last Admin: 11/20/18 09:30 Dose:  100 mls/hr


Lactobacillus Acidophilus (Bacid Acidophilus)  1 cap PO BID [facility]


   Last Admin: 11/20/18 09:30 Dose:  1 cap


Levetiracetam (Keppra)  500 mg PO BID [facility]


   Last Admin: 11/20/18 09:30 Dose:  500 mg











- Labs


Labs: 


                                        





                                 11/19/18 06:00 





                                 11/19/18 06:00 





                                        











PT  12.6 Seconds (9.8-13.1)   10/19/18  23:05    


 


INR  1.1   10/19/18  23:05    


 


APTT  36.2 Seconds (25.6-37.1)   10/19/18  23:05    














Attending/Attestation





- Attestation


I have personally seen and examined this patient.: Yes


I have fully participated in the care of the patient.: Yes


I have reviewed all pertinent clinical information, including history, physical 

exam and plan: Yes


Notes (Text): 





11/20/18 17:20


Seen examined and discussed with resident. 


Agree with findings and plan as above.

## 2018-11-21 RX ADMIN — Medication SCH CAP: at 08:29

## 2018-11-21 RX ADMIN — Medication SCH CAP: at 16:49

## 2018-11-21 RX ADMIN — ENOXAPARIN SODIUM SCH MG: 40 INJECTION SUBCUTANEOUS at 08:26

## 2018-11-21 NOTE — CP.PCM.PN
<Flavio Lugo - Last Filed: 11/21/18 11:58>





Subjective





- Date & Time of Evaluation


Date of Evaluation: 11/21/18


Time of Evaluation: 09:31





- Subjective


Subjective: 





Pt seen and examined. No acute event overnight. Patient was eating breakfast he 

have no complain.





Objective





- Vital Signs/Intake and Output


Vital Signs (last 24 hours): 


                                        











Temp Pulse Resp BP Pulse Ox


 


 98.2 F   54 L  18   138/68   97 


 


 11/21/18 08:32  11/21/18 08:32  11/21/18 08:32  11/21/18 08:32  11/21/18 08:32











- Medications


Medications: 


                               Current Medications





Amlodipine Besylate (Norvasc)  5 mg PO DAILY [facility]


   Last Admin: 11/21/18 08:26 Dose:  5 mg


Enoxaparin Sodium (Lovenox)  40 mg SC DAILY [facility]; Protocol


   Last Admin: 11/21/18 08:26 Dose:  40 mg


Cefazolin Sodium 2 gm/ Sodium (Chloride)  100 mls @ 100 mls/hr IVPB Q8 [facility]; 

Protocol


   Last Admin: 11/21/18 08:25 Dose:  100 mls/hr


Lactobacillus Acidophilus (Bacid Acidophilus)  1 cap PO BID [facility]


   Last Admin: 11/21/18 08:29 Dose:  1 cap


Levetiracetam (Keppra)  500 mg PO BID [facility]


   Last Admin: 11/21/18 08:26 Dose:  500 mg











- Labs


Labs: 


                                        





                                 11/19/18 06:00 





                                 11/19/18 06:00 





                                        











PT  12.6 Seconds (9.8-13.1)   10/19/18  23:05    


 


INR  1.1   10/19/18  23:05    


 


APTT  36.2 Seconds (25.6-37.1)   10/19/18  23:05    














- Constitutional


Appears: Well, Non-toxic, No Acute Distress





- Head Exam


Head Exam: ATRAUMATIC, NORMAL INSPECTION, NORMOCEPHALIC





- Eye Exam


Eye Exam: EOMI, Normal appearance, PERRL


Pupil Exam: NORMAL ACCOMODATION, PERRL





- ENT Exam


ENT Exam: Mucous Membranes Moist, Normal Exam





- Neck Exam


Neck Exam: Full ROM, Normal Inspection





- Respiratory Exam


Respiratory Exam: Clear to Ausculation Bilateral, NORMAL BREATHING PATTERN





- Cardiovascular Exam


Cardiovascular Exam: REGULAR RHYTHM





- GI/Abdominal Exam


GI & Abdominal Exam: Soft, Normal Bowel Sounds





- Extremities Exam


Extremities Exam: Full ROM, Normal Capillary Refill, Normal Inspection





- Back Exam


Back Exam: NORMAL INSPECTION





- Neurological Exam


Neurological Exam: Alert, Awake, Oriented x3





Assessment and Plan





- Assessment and Plan (Free Text)


Assessment: 





This is a 58 history of alcohol abuse with seizures, non-compliance to 

medication is brought to ED with recurrent seizures, Alcohol withdrawal and AMS.

Patient was afebrile on admission, blood culture + bacteremia with MSSA, MARY 

showed Aortic Valve vegetation and is on Ancef 2g IV Q8hj via PICC since 10/22.





* Patient is being treated for Endocarditis bacteremia with Abx for 6 wk 


Ancef 2g Q8hr day 31#


Plan: 





1. Endocarditis Bacteremia 


-Stable


-MARY + for vegetation on AV 


-Blood Culture Negative Final


-Picc line in place on Right arm 


-Will continue ABx by PICC line in Hospital as ID Dr. Dumas- State patient may 

use PICC line for IV abuse. 


-Ancef 2g Q8hr Day #31





2.HTN


-Blood pressure controlled


-Continue Norvasc 5mg QD


- Monitor BP





3. Abnormal LFTs


-Improved, was abnormal due to ETOH vs Chronic Hep C+ 


-MELD score 7


-Hep C ab-reactive


-Hepatitis C- Genotype 1A


-Hep C RNA PCR- positive 


-AST 84, ALT 61 11/15, LFT are trending down


-Abd us-Diffuse increased liver echogenicity compatible with fatty or other 

liver pathology, no masses or dilated ducts 


-HIV neg


-Non immune Hep B, Hep A negative





4. Status Epilepticus due to non compliance of medication and most probably to 

Alcohol withdrawal.


-Resolved


-Neurology consult Dr Mari, recomendations appreciatted.


-EEG:Conclusion: Normal EEG 


-Keppra 500mg PO Q12H


-Seizure precaution 


-PT eval and Tx





5. Alcohol Withdrawal


-resolved 


-CIWA score 0, Alc level <10


-Discontinued Librium and Ativan


-Folic Acid and Thiamine PO





6.  DVT prophylaxis


-Lovenox 40mg





Code Status


-Full





<Jazmín Campos - Last Filed: 11/21/18 18:04>





Objective





- Vital Signs/Intake and Output


Vital Signs (last 24 hours): 


                                        











Temp Pulse Resp BP Pulse Ox


 


 97.8 F   51 L  18   142/70   98 


 


 11/21/18 16:05  11/21/18 16:05  11/21/18 16:05  11/21/18 16:05  11/21/18 16:05











- Medications


Medications: 


                               Current Medications





Amlodipine Besylate (Norvasc)  5 mg PO DAILY [facility]


   Last Admin: 11/21/18 08:26 Dose:  5 mg


Enoxaparin Sodium (Lovenox)  40 mg SC DAILY [facility]; Protocol


   Last Admin: 11/21/18 08:26 Dose:  40 mg


Cefazolin Sodium 2 gm/ Sodium (Chloride)  100 mls @ 100 mls/hr IVPB Q8 [facility]; 

Protocol


   Last Admin: 11/21/18 16:47 Dose:  100 mls/hr


Lactobacillus Acidophilus (Bacid Acidophilus)  1 cap PO BID [facility]


   Last Admin: 11/21/18 16:49 Dose:  1 cap


Levetiracetam (Keppra)  500 mg PO BID [facility]


   Last Admin: 11/21/18 16:47 Dose:  500 mg











- Labs


Labs: 


                                        





                                 11/19/18 06:00 





                                 11/19/18 06:00 





                                        











PT  12.6 Seconds (9.8-13.1)   10/19/18  23:05    


 


INR  1.1   10/19/18  23:05    


 


APTT  36.2 Seconds (25.6-37.1)   10/19/18  23:05    














Attending/Attestation





- Attestation


I have personally seen and examined this patient.: Yes


I have fully participated in the care of the patient.: Yes


I have reviewed all pertinent clinical information, including history, physical 

exam and plan: Yes


Notes (Text): 





11/21/18 18:04


agree with findings and plan as above.

## 2018-11-22 RX ADMIN — Medication SCH CAP: at 17:12

## 2018-11-22 RX ADMIN — ENOXAPARIN SODIUM SCH MG: 40 INJECTION SUBCUTANEOUS at 10:08

## 2018-11-22 RX ADMIN — Medication SCH CAP: at 10:07

## 2018-11-22 NOTE — CP.PCM.PN
Subjective





- Date & Time of Evaluation


Date of Evaluation: 11/22/18


Time of Evaluation: 07:00





- Subjective


Subjective: 


Pt is seen and examined at bedside. Patient had no acute event overnight. 

patient finished breakfast and have no complains. 





Objective





- Vital Signs/Intake and Output


Vital Signs (last 24 hours): 


                                        











Temp Pulse Resp BP Pulse Ox


 


 97.7 F   53 L  20   138/67   99 


 


 11/22/18 07:58  11/22/18 10:08  11/22/18 07:58  11/22/18 10:08  11/22/18 07:58











- Medications


Medications: 


                               Current Medications





Amlodipine Besylate (Norvasc)  5 mg PO DAILY [facility]


   Last Admin: 11/22/18 10:08 Dose:  5 mg


Enoxaparin Sodium (Lovenox)  40 mg SC DAILY [facility]; Protocol


   Last Admin: 11/22/18 10:08 Dose:  40 mg


Cefazolin Sodium 2 gm/ Sodium (Chloride)  100 mls @ 100 mls/hr IVPB Q8 [facility]; 

Protocol


   Last Admin: 11/22/18 10:07 Dose:  100 mls/hr


Lactobacillus Acidophilus (Bacid Acidophilus)  1 cap PO BID [facility]


   Last Admin: 11/22/18 10:07 Dose:  1 cap


Levetiracetam (Keppra)  500 mg PO BID [facility]


   Last Admin: 11/22/18 10:07 Dose:  500 mg











- Labs


Labs: 


                                        





                                 11/19/18 06:00 





                                 11/19/18 06:00 





                                        











PT  12.6 Seconds (9.8-13.1)   10/19/18  23:05    


 


INR  1.1   10/19/18  23:05    


 


APTT  36.2 Seconds (25.6-37.1)   10/19/18  23:05    














- Constitutional


Appears: Well, Non-toxic, No Acute Distress





- Head Exam


Head Exam: ATRAUMATIC, NORMAL INSPECTION, NORMOCEPHALIC





- Eye Exam


Eye Exam: EOMI, Normal appearance, PERRL


Pupil Exam: NORMAL ACCOMODATION, PERRL





- ENT Exam


ENT Exam: Mucous Membranes Moist, Normal Exam





- Neck Exam


Neck Exam: Full ROM, Normal Inspection





- Respiratory Exam


Respiratory Exam: Clear to Ausculation Bilateral, NORMAL BREATHING PATTERN





- Cardiovascular Exam


Cardiovascular Exam: REGULAR RHYTHM, +S1, +S2





- GI/Abdominal Exam


GI & Abdominal Exam: Soft, Normal Bowel Sounds.  absent: Tenderness





- Extremities Exam


Extremities Exam: Full ROM, Normal Capillary Refill, Normal Inspection





- Back Exam


Back Exam: NORMAL INSPECTION.  absent: CVA tenderness (L), CVA tenderness (R)





- Neurological Exam


Neurological Exam: Alert, Awake, Normal Gait, Oriented x3





- Psychiatric Exam


Psychiatric exam: Normal Affect, Normal Mood





- Skin


Skin Exam: Dry, Intact, Normal Color, Warm





Assessment and Plan





- Assessment and Plan (Free Text)


Assessment: 





Assessment: 





This is a 60 history of alcohol abuse with seizures, non-compliance to me

dication is brought to ED with recurrent seizures, Alcohol withdrawal and AMS. 

Patient was afebrile on admission, blood culture + bacteremia with MSSA, MARY 

showed Aortic Valve vegetation and is on Ancef 2g IV Q8hj via PICC since 10/22.





* Patient is being treated for Endocarditis bacteremia with Abx for 6 wk 


Ancef 2g Q8hr day 32#


Plan: 





1. Endocarditis Bacteremia 


-Stable


-MARY + for vegetation on AV 


-Blood Culture Negative Final


-Picc line in place on Right arm 


-Will continue ABx by PICC line in Hospital as ID Dr. Dumas- State patient may 

use PICC line for IV abuse. 


-Ancef 2g Q8hr Day #32





2.HTN


-Blood pressure controlled


-Continue Norvasc 5mg QD


- Monitor BP





3. Abnormal LFTs


-Improved, was abnormal due to ETOH vs Chronic Hep C+ 


-MELD score 7


-Hep C ab-reactive


-Hepatitis C- Genotype 1A


-Hep C RNA PCR- positive 


-AST 84, ALT 61 11/15, LFT are trending down


-Abd us-Diffuse increased liver echogenicity compatible with fatty or other 

liver pathology, no masses or dilated ducts 


-HIV neg


-Non immune Hep B, Hep A negative





4. Status Epilepticus due to non compliance of medication and most probably to 

Alcohol withdrawal.


-Resolved


-Neurology consult Dr Mari, recomendations appreciatted.


-EEG:Conclusion: Normal EEG 


-Keppra 500mg PO Q12H


-Seizure precaution 


-PT eval and Tx





5. Alcohol Withdrawal


-resolved 


-CIWA score 0, Alc level <10


-Discontinued Librium and Ativan


-Folic Acid and Thiamine PO





6.  DVT prophylaxis


-Lovenox 40mg





Code Status


-Full

## 2018-11-23 LAB
ERYTHROCYTE [DISTWIDTH] IN BLOOD BY AUTOMATED COUNT: 12.9 % (ref 11.5–14.5)
HGB BLD-MCNC: 12.9 G/DL (ref 12–18)
MCH RBC QN AUTO: 29.3 PG (ref 27–31)
MCHC RBC AUTO-ENTMCNC: 32.8 G/DL (ref 33–37)
MCV RBC AUTO: 89.4 FL (ref 80–94)
PLATELET # BLD: 174 K/UL (ref 130–400)
RBC # BLD AUTO: 4.39 MIL/UL (ref 4.4–5.9)
WBC # BLD AUTO: 5.7 K/UL (ref 4.8–10.8)

## 2018-11-23 RX ADMIN — Medication SCH CAP: at 16:39

## 2018-11-23 RX ADMIN — ENOXAPARIN SODIUM SCH MG: 40 INJECTION SUBCUTANEOUS at 09:08

## 2018-11-23 RX ADMIN — Medication SCH CAP: at 09:07

## 2018-11-23 NOTE — CP.PCM.PN
Subjective





- Date & Time of Evaluation


Date of Evaluation: 11/23/18


Time of Evaluation: 06:39





- Subjective


Subjective: 





Patient is seen and examined at bedside. no acute event overnight. Patient slept

well, eating breakfast. Have no complain. 





Objective





- Vital Signs/Intake and Output


Vital Signs (last 24 hours): 


                                        











Temp Pulse Resp BP Pulse Ox


 


 97.5 F L  59 L  18   116/71   98 


 


 11/23/18 00:04  11/23/18 00:04  11/23/18 00:04  11/23/18 00:04  11/23/18 00:04











- Medications


Medications: 


                               Current Medications





Amlodipine Besylate (Norvasc)  5 mg PO DAILY [facility]


   Last Admin: 11/22/18 10:08 Dose:  5 mg


Enoxaparin Sodium (Lovenox)  40 mg SC DAILY [facility]; Protocol


   Last Admin: 11/22/18 10:08 Dose:  40 mg


Cefazolin Sodium 2 gm/ Sodium (Chloride)  100 mls @ 100 mls/hr IVPB Q8 [facility]; 

Protocol


   Last Admin: 11/23/18 00:14 Dose:  100 mls/hr


Lactobacillus Acidophilus (Bacid Acidophilus)  1 cap PO BID [facility]


   Last Admin: 11/22/18 17:12 Dose:  1 cap


Levetiracetam (Keppra)  500 mg PO BID [facility]


   Last Admin: 11/22/18 17:12 Dose:  500 mg











- Labs


Labs: 


                                        





                                 11/19/18 06:00 





                                 11/19/18 06:00 





                                        











PT  12.6 Seconds (9.8-13.1)   10/19/18  23:05    


 


INR  1.1   10/19/18  23:05    


 


APTT  36.2 Seconds (25.6-37.1)   10/19/18  23:05    














- Constitutional


Appears: Well, Non-toxic, No Acute Distress





- Head Exam


Head Exam: ATRAUMATIC, NORMAL INSPECTION, NORMOCEPHALIC





- Eye Exam


Eye Exam: EOMI, Normal appearance, PERRL


Pupil Exam: NORMAL ACCOMODATION





- ENT Exam


ENT Exam: Mucous Membranes Moist, Normal Exam





- Neck Exam


Neck Exam: Full ROM, Normal Inspection





- Respiratory Exam


Respiratory Exam: Clear to Ausculation Bilateral, NORMAL BREATHING PATTERN





- Cardiovascular Exam


Cardiovascular Exam: REGULAR RHYTHM, +S1, +S2





- GI/Abdominal Exam


GI & Abdominal Exam: Soft, Normal Bowel Sounds





- Extremities Exam


Extremities Exam: Full ROM, Normal Capillary Refill, Normal Inspection





- Back Exam


Back Exam: NORMAL INSPECTION





- Neurological Exam


Neurological Exam: Alert, Awake





- Skin


Skin Exam: Dry, Intact, Normal Color, Warm





Assessment and Plan





- Assessment and Plan (Free Text)


Assessment: 





This is a 58 history of alcohol abuse with seizures, non-compliance to 

medication is brought to ED with recurrent seizures, Alcohol withdrawal and AMS.

Patient was afebrile on admission, blood culture + bacteremia with MSSA, MARY 

showed Aortic Valve vegetation and is on Ancef 2g IV Q8hj via PICC since 10/22.





* Patient is being treated for Endocarditis bacteremia with Abx for 6 wk 


Ancef 2g Q8hr day 33#





Plan: 





1. Endocarditis Bacteremia 


-Stable


-MARY + for vegetation on AV 


-Blood Culture Negative Final


-Picc line in place on Right arm 


-Will continue ABx by PICC line in Hospital as ID Dr. Dumas- State patient may 

use PICC line for IV abuse. 


-Ancef 2g Q8hr Day #33





2.HTN


-Blood pressure controlled


-Continue Norvasc 5mg QD


- Monitor BP





3. Abnormal LFTs


-Improved, was abnormal due to ETOH vs Chronic Hep C+ 


-MELD score 7


-Hep C ab-reactive


-Hepatitis C- Genotype 1A


-Hep C RNA PCR- positive 


-AST 84, ALT 61 11/15, LFT are trending down


-Abd us-Diffuse increased liver echogenicity compatible with fatty or other 

liver pathology, no masses or dilated ducts 


-HIV neg


-Non immune Hep B, Hep A negative





4. Status Epilepticus due to non compliance of medication and most probably to 

Alcohol withdrawal.


-Resolved


-Neurology consult Dr Mari, recommendations appreciated.


-EEG:Conclusion: Normal EEG 


-Keppra 500mg PO Q12H


-Seizure precaution 


-PT eval and Tx





5. Alcohol Withdrawal


-resolved 


-CIWA score 0, Alc level <10


-Discontinued Librium and Ativan


-Folic Acid and Thiamine PO





6.  DVT prophylaxis


-Lovenox 40mg





Code Status


-Full

## 2018-11-24 RX ADMIN — ENOXAPARIN SODIUM SCH MG: 40 INJECTION SUBCUTANEOUS at 08:55

## 2018-11-24 RX ADMIN — Medication SCH CAP: at 16:37

## 2018-11-24 RX ADMIN — CEFAZOLIN SODIUM SCH MLS/HR: 2 SOLUTION INTRAVENOUS at 16:37

## 2018-11-24 RX ADMIN — Medication SCH CAP: at 08:55

## 2018-11-24 NOTE — CP.PCM.PN
Objective





- Vital Signs/Intake and Output


Vital Signs (last 24 hours): 


                                        











Temp Pulse Resp BP Pulse Ox


 


 97.8 F   57 L  20   138/76   96 


 


 11/24/18 08:03  11/24/18 08:03  11/24/18 08:03  11/24/18 08:03  11/24/18 08:03











- Medications


Medications: 


                               Current Medications





Amlodipine Besylate (Norvasc)  5 mg PO DAILY [facility]


   Last Admin: 11/23/18 09:07 Dose:  5 mg


Enoxaparin Sodium (Lovenox)  40 mg SC DAILY [facility]; Protocol


   Last Admin: 11/23/18 09:08 Dose:  40 mg


Cefazolin Sodium 2 gm/ Sodium (Chloride)  100 mls @ 100 mls/hr IVPB Q8 [facility]; 

Protocol


   Last Admin: 11/24/18 00:15 Dose:  100 mls/hr


Lactobacillus Acidophilus (Bacid Acidophilus)  1 cap PO BID [facility]


   Last Admin: 11/23/18 16:39 Dose:  1 cap


Levetiracetam (Keppra)  500 mg PO BID [facility]


   Last Admin: 11/23/18 16:41 Dose:  500 mg











- Labs


Labs: 


                                        





                                 11/23/18 05:45 





                                 11/19/18 06:00 





                                        











PT  12.6 Seconds (9.8-13.1)   10/19/18  23:05    


 


INR  1.1   10/19/18  23:05    


 


APTT  36.2 Seconds (25.6-37.1)   10/19/18  23:05

## 2018-11-24 NOTE — CP.PCM.PN
Subjective





- Date & Time of Evaluation


Date of Evaluation: 11/24/18


Time of Evaluation: 13:00





- Subjective


Subjective: 


Patient seen and examined bedside . Feeling well. Hemodynamically stable, 

afebrile. No acute issues overnight.





Objective





- Vital Signs/Intake and Output


Vital Signs (last 24 hours): 


                                        











Temp Pulse Resp BP Pulse Ox


 


 97.7 F   53 L  18   137/60   97 


 


 11/24/18 16:18  11/24/18 16:18  11/24/18 16:18  11/24/18 16:18  11/24/18 16:18











- Medications


Medications: 


                               Current Medications





Amlodipine Besylate (Norvasc)  5 mg PO DAILY [facility]


   Last Admin: 11/24/18 08:55 Dose:  5 mg


Enoxaparin Sodium (Lovenox)  40 mg SC DAILY [facility]; Protocol


   Last Admin: 11/24/18 08:55 Dose:  40 mg


Cefazolin Sodium/Dextrose (Ancef Iv 2 Gm Duplex)  2 gm in 50 mls @ 50 mls/hr 

IVPB Q8 [facility]; Protocol


   Last Admin: 11/24/18 16:37 Dose:  50 mls/hr


Lactobacillus Acidophilus (Bacid Acidophilus)  1 cap PO BID [facility]


   Last Admin: 11/24/18 16:37 Dose:  1 cap


Levetiracetam (Keppra)  500 mg PO BID [facility]


   Last Admin: 11/24/18 16:38 Dose:  500 mg











- Labs


Labs: 


                                        





                                 11/23/18 05:45 





                                 11/19/18 06:00 





                                        











PT  12.6 Seconds (9.8-13.1)   10/19/18  23:05    


 


INR  1.1   10/19/18  23:05    


 


APTT  36.2 Seconds (25.6-37.1)   10/19/18  23:05    














- Constitutional


Appears: Non-toxic, No Acute Distress





- Head Exam


Head Exam: ATRAUMATIC, NORMAL INSPECTION, NORMOCEPHALIC





- Eye Exam


Eye Exam: EOMI, Normal appearance, PERRL


Pupil Exam: NORMAL ACCOMODATION





- ENT Exam


ENT Exam: Mucous Membranes Moist, Normal Exam





- Neck Exam


Neck Exam: Full ROM, Normal Inspection





- Respiratory Exam


Respiratory Exam: Clear to Ausculation Bilateral, NORMAL BREATHING PATTERN.  

absent: Rales, Rhonchi, Wheezes





- Cardiovascular Exam


Cardiovascular Exam: REGULAR RHYTHM, RRR, +S1, +S2.  absent: JVD





- GI/Abdominal Exam


GI & Abdominal Exam: Soft, Normal Bowel Sounds.  absent: Distended, Guarding, 

Tenderness, Rebound





- Rectal Exam


Rectal Exam: Deferred





- Extremities Exam


Extremities Exam: Full ROM, Normal Capillary Refill, Normal Inspection.  absent:

Calf Tenderness, Pedal Edema





- Back Exam


Back Exam: NORMAL INSPECTION





- Neurological Exam


Neurological Exam: Alert, Awake, CN II-XII Intact, Oriented x3





- Psychiatric Exam


Psychiatric exam: Normal Affect, Normal Mood





- Skin


Skin Exam: Dry, Intact, Normal Color, Warm





Assessment and Plan





- Assessment and Plan (Free Text)


Assessment: 


57 y/o male with PMH alcoholism brought to ED for evaluation with  witnessed 

seizure episode and AMS . Patient initially admitted for ETOH withdrawals , Post

ictal , seizure prevention.Diagnosed with aortic valve vegetation and at present

on Iv Ancef 





1. Aortic valve endocarditis and bacteremia


1 blood cx was reported positive for MSSA .


MARY showed aortic valve vegetation 


At present on Ancef IV for endocarditis and bacteremia Day # 33 


patient has history of IV drug abuse so can not be discharged home with PICC 

line for outpatient IV antibiotics 


Plan to continue 4-6 weeks of IV antibiotics . 


Repeat blood cx with no growth so far 


Mental status AAOx3 


Continue Keppra for seizure prevention 





2.Hypertension 


controlled on Norvasc 





3.Alcoholism


 


4.Seizure


seizure free since admission 


on Keppra PO  





5.Chronic hepatitis C and transaminitis 


stable





6.Asymptomatic sinus bradycardia








7. DVT prophylaxis


on Lovenox Midline ABD with staples

## 2018-11-25 RX ADMIN — Medication SCH CAP: at 16:31

## 2018-11-25 RX ADMIN — ENOXAPARIN SODIUM SCH MG: 40 INJECTION SUBCUTANEOUS at 09:39

## 2018-11-25 RX ADMIN — Medication SCH CAP: at 09:38

## 2018-11-25 RX ADMIN — CEFAZOLIN SODIUM SCH MLS/HR: 2 SOLUTION INTRAVENOUS at 16:31

## 2018-11-25 RX ADMIN — CEFAZOLIN SODIUM SCH MLS/HR: 2 SOLUTION INTRAVENOUS at 09:38

## 2018-11-25 RX ADMIN — CEFAZOLIN SODIUM SCH MLS/HR: 2 SOLUTION INTRAVENOUS at 00:36

## 2018-11-25 NOTE — CP.PCM.PN
Subjective





- Date & Time of Evaluation


Date of Evaluation: 11/25/18


Time of Evaluation: 07:00





- Subjective


Subjective: 





Pt Seen and examined at bedside. No acute event. Pt have no complain today.





Objective





- Vital Signs/Intake and Output


Vital Signs (last 24 hours): 


                                        











Temp Pulse Resp BP Pulse Ox


 


 98 F   68   19   117/55 L  96 


 


 11/25/18 00:37  11/25/18 00:37  11/25/18 00:37  11/25/18 00:37  11/25/18 00:37











- Medications


Medications: 


                               Current Medications





Amlodipine Besylate (Norvasc)  5 mg PO DAILY [facility]


   Last Admin: 11/24/18 08:55 Dose:  5 mg


Enoxaparin Sodium (Lovenox)  40 mg SC DAILY [facility]; Protocol


   Last Admin: 11/24/18 08:55 Dose:  40 mg


Cefazolin Sodium/Dextrose (Ancef Iv 2 Gm Duplex)  2 gm in 50 mls @ 50 mls/hr 

IVPB Q8 [facility]; Protocol


   Last Admin: 11/25/18 00:36 Dose:  50 mls/hr


Lactobacillus Acidophilus (Bacid Acidophilus)  1 cap PO BID [facility]


   Last Admin: 11/24/18 16:37 Dose:  1 cap


Levetiracetam (Keppra)  500 mg PO BID [facility]


   Last Admin: 11/24/18 16:38 Dose:  500 mg











- Labs


Labs: 


                                        





                                 11/23/18 05:45 





                                 11/19/18 06:00 





                                        











PT  12.6 Seconds (9.8-13.1)   10/19/18  23:05    


 


INR  1.1   10/19/18  23:05    


 


APTT  36.2 Seconds (25.6-37.1)   10/19/18  23:05    














Assessment and Plan





- Assessment and Plan (Free Text)


Assessment: 


Assessment: 





This is a 58 history of alcohol abuse with seizures, non-compliance to 

medication is brought to ED with recurrent seizures, Alcohol withdrawal and AMS.

Patient was afebrile on admission, blood culture + bacteremia with MSSA, MARY 

showed Aortic Valve vegetation and is on Ancef 2g IV Q8hj via PICC since 10/22.





* Patient is being treated for Endocarditis bacteremia with Abx for 6 wk 


Ancef 2g Q8hr day 34#





Plan: 





1. Endocarditis Bacteremia 


-Stable


-MARY + for vegetation on AV 


-Blood Culture Negative Final


-Picc line in place on Right arm 


-Will continue ABx by PICC line in Hospital as ID Dr. Dumas- State patient may 

use PICC line for IV abuse. 


-Ancef 2g Q8hr Day #34





2.HTN


-Blood pressure controlled


-Continue Norvasc 5mg QD


-Monitor BP





3. Abnormal LFTs


-Improved, was abnormal due to ETOH vs Chronic Hep C+ 


-MELD score 7


-Hep C ab-reactive


-Hepatitis C- Genotype 1A


-Hep C RNA PCR- positive 


-AST 84, ALT 61 11/15, LFT are trending down


-Abd us-Diffuse increased liver echogenicity compatible with fatty or other 

liver pathology, no masses or dilated ducts 


-HIV neg


-Non immune Hep B, Hep A negative





4. Status Epilepticus due to non compliance of medication and most probably to 

Alcohol withdrawal.


-Resolved


-Neurology consult Dr Mari, recommendations appreciated.


-EEG:Conclusion: Normal EEG 


-Keppra 500mg PO Q12H


-Seizure precaution 


-PT eval and Tx





5. Alcohol Withdrawal


-resolved 


-CIWA score 0, Alc level <10


-Discontinued Librium and Ativan


-Folic Acid and Thiamine PO





6.  DVT prophylaxis


-Lovenox 40mg





Code Status


-Full

## 2018-11-26 RX ADMIN — CEFAZOLIN SODIUM SCH MLS/HR: 2 SOLUTION INTRAVENOUS at 01:26

## 2018-11-26 RX ADMIN — CEFAZOLIN SODIUM SCH MLS/HR: 2 SOLUTION INTRAVENOUS at 16:41

## 2018-11-26 RX ADMIN — Medication SCH CAP: at 08:54

## 2018-11-26 RX ADMIN — ENOXAPARIN SODIUM SCH MG: 40 INJECTION SUBCUTANEOUS at 08:54

## 2018-11-26 RX ADMIN — CEFAZOLIN SODIUM SCH MLS/HR: 2 SOLUTION INTRAVENOUS at 08:53

## 2018-11-26 RX ADMIN — Medication SCH CAP: at 16:44

## 2018-11-26 NOTE — CP.PCM.PN
<HoracioJj - Last Filed: 11/26/18 10:10>





Subjective





- Date & Time of Evaluation


Date of Evaluation: 11/26/18


Time of Evaluation: 10:10





- Subjective


Subjective: 





57 y/o M patient seen and evaluated at the bedside for Endocarditis bacteremia. 

Patient was sitting comfortably in his bed and NAD. Patient states that he 

didn't have any overnight acute events. Patient denies any overnight 

F/N/V/C/C/SOB/CP/Diarrhea.























Objective





- Vital Signs/Intake and Output


Vital Signs (last 24 hours): 


                                        











Temp Pulse Resp BP Pulse Ox


 


 97.5 F L  61   20   128/67   98 


 


 11/26/18 08:03  11/26/18 08:55  11/26/18 08:03  11/26/18 08:55  11/26/18 08:03











- Medications


Medications: 


                               Current Medications





Amlodipine Besylate (Norvasc)  5 mg PO DAILY [facility]


   Last Admin: 11/26/18 08:55 Dose:  5 mg


Enoxaparin Sodium (Lovenox)  40 mg SC DAILY [facility]; Protocol


   Last Admin: 11/26/18 08:54 Dose:  40 mg


Cefazolin Sodium/Dextrose (Ancef Iv 2 Gm Duplex)  2 gm in 50 mls @ 50 mls/hr 

IVPB Q8 [facility]; Protocol


   Last Admin: 11/26/18 08:53 Dose:  50 mls/hr


Lactobacillus Acidophilus (Bacid Acidophilus)  1 cap PO BID [facility]


   Last Admin: 11/26/18 08:54 Dose:  1 cap


Levetiracetam (Keppra)  500 mg PO BID [facility]


   Last Admin: 11/26/18 08:54 Dose:  500 mg











- Labs


Labs: 


                                        





                                 11/23/18 05:45 





                                 11/19/18 06:00 





                                        











PT  12.6 Seconds (9.8-13.1)   10/19/18  23:05    


 


INR  1.1   10/19/18  23:05    


 


APTT  36.2 Seconds (25.6-37.1)   10/19/18  23:05    














- Constitutional


Appears: Well, Non-toxic, No Acute Distress





- Head Exam


Head Exam: ATRAUMATIC, NORMOCEPHALIC





- Eye Exam


Eye Exam: EOMI, Normal appearance, PERRL


Pupil Exam: NORMAL ACCOMODATION, PERRL





- ENT Exam


ENT Exam: Mucous Membranes Moist, Normal Exam





- Neck Exam


Neck Exam: Full ROM, Normal Inspection





- Respiratory Exam


Respiratory Exam: Clear to Ausculation Bilateral, NORMAL BREATHING PATTERN





- Cardiovascular Exam


Cardiovascular Exam: REGULAR RHYTHM





- GI/Abdominal Exam


GI & Abdominal Exam: Soft, Normal Bowel Sounds





- Extremities Exam


Extremities Exam: Normal Capillary Refill, Normal Inspection





- Back Exam


Back Exam: NORMAL INSPECTION





- Neurological Exam


Neurological Exam: Alert, Awake, Oriented x3


Neuro motor strength exam: Left Upper Extremity: 5, Right Upper Extremity: 5, 

Left Lower Extremity: 5, Right Lower Extremity: 5





- Psychiatric Exam


Psychiatric exam: Normal Affect, Normal Mood





- Skin


Skin Exam: Dry, Intact, Normal Color, Warm





Assessment and Plan





- Assessment and Plan (Free Text)


Assessment: 





57 y/o M patient seen and evaluated at the bedside for Endocarditis bacteremia 

with Abx for 6 wk Ancef 2g Q8hr day 35#











Plan: 








1. Endocarditis Bacteremia 


-Stable


-MARY + for vegetation on AV 


-Blood Culture Negative Final


-Picc line in place on Right arm 


-Will continue ABx by PICC line in Hospital as ID Dr. Dumas- State patient may 

use PICC line for IV abuse. 


-Ancef 2g Q8hr Day #35





2.HTN


-Blood pressure controlled


-Continue Norvasc 5mg QD


-Monitor BP





3. Abnormal LFTs


-Improved, was abnormal due to ETOH vs Chronic Hep C+ 


-MELD score 7


-Hep C ab-reactive


-Hepatitis C- Genotype 1A


-Hep C RNA PCR- positive 


-AST 84, ALT 61 11/15, LFT are trending down


-Abd us-Diffuse increased liver echogenicity compatible with fatty or other 

liver pathology, no masses or dilated ducts 


-HIV neg


-Non immune Hep B, Hep A negative





4. Status Epilepticus due to non compliance of medication and most probably to 

Alcohol withdrawal.


-Resolved


-Neurology consult Dr Mari, recommendations appreciated.


-EEG:Conclusion: Normal EEG 


-Keppra 500mg PO Q12H


-Seizure precaution 


-PT eval and Tx





5. Alcohol Withdrawal


-resolved 


-CIWA score 0, Alc level <10


-Discontinued Librium and Ativan


-Folic Acid and Thiamine PO





6.  DVT prophylaxis


-Lovenox 40mg





<Henning,Jose Enrique D - Last Filed: 11/26/18 14:01>





Objective





- Vital Signs/Intake and Output


Vital Signs (last 24 hours): 


                                        











Temp Pulse Resp BP Pulse Ox


 


 97.5 F L  61   20   128/67   98 


 


 11/26/18 08:03  11/26/18 08:55  11/26/18 08:03  11/26/18 08:55  11/26/18 08:03











- Medications


Medications: 


                               Current Medications





Amlodipine Besylate (Norvasc)  5 mg PO DAILY [facility]


   Last Admin: 11/26/18 08:55 Dose:  5 mg


Enoxaparin Sodium (Lovenox)  40 mg SC DAILY [facility]; Protocol


   Last Admin: 11/26/18 08:54 Dose:  40 mg


Cefazolin Sodium/Dextrose (Ancef Iv 2 Gm Duplex)  2 gm in 50 mls @ 50 mls/hr 

IVPB Q8 [facility]; Protocol


   Last Admin: 11/26/18 08:53 Dose:  50 mls/hr


Lactobacillus Acidophilus (Bacid Acidophilus)  1 cap PO BID [facility]


   Last Admin: 11/26/18 08:54 Dose:  1 cap


Levetiracetam (Keppra)  500 mg PO BID [facility]


   Last Admin: 11/26/18 08:54 Dose:  500 mg











- Labs


Labs: 


                                        





                                 11/23/18 05:45 





                                 11/19/18 06:00 





                                        











PT  12.6 Seconds (9.8-13.1)   10/19/18  23:05    


 


INR  1.1   10/19/18  23:05    


 


APTT  36.2 Seconds (25.6-37.1)   10/19/18  23:05    














Attending/Attestation





- Attestation


I have personally seen and examined this patient.: Yes


I have fully participated in the care of the patient.: Yes


I have reviewed all pertinent clinical information, including history, physical 

exam and plan: Yes


Notes (Text): 





11/26/18 14:00


Patient seen and examined with resident. Case discussed and agreed with 

assessment and plan.

## 2018-11-27 RX ADMIN — CEFAZOLIN SODIUM SCH MLS/HR: 2 SOLUTION INTRAVENOUS at 00:17

## 2018-11-27 RX ADMIN — ENOXAPARIN SODIUM SCH MG: 40 INJECTION SUBCUTANEOUS at 08:41

## 2018-11-27 RX ADMIN — Medication SCH CAP: at 08:41

## 2018-11-27 RX ADMIN — Medication SCH CAP: at 16:34

## 2018-11-27 RX ADMIN — CEFAZOLIN SODIUM SCH MLS/HR: 2 SOLUTION INTRAVENOUS at 08:41

## 2018-11-27 RX ADMIN — CEFAZOLIN SODIUM SCH MLS/HR: 2 SOLUTION INTRAVENOUS at 16:33

## 2018-11-27 NOTE — CP.PCM.PN
Subjective





- Date & Time of Evaluation


Date of Evaluation: 11/27/18


Time of Evaluation: 12:19





- Subjective


Subjective: 





59 y/o M patient seen and evaluated at the bedside for Endocarditis bacteremia. 

Patient was sitting comfortably in his bed and NAD. Patient states that he 

didn't have any overnight acute events. Patient denies any overnight 

F/N/V/C/C/SOB/CP/Diarrhea.


























Objective





- Vital Signs/Intake and Output


Vital Signs (last 24 hours): 


                                        











Temp Pulse Resp BP Pulse Ox


 


 98.7 F   55 L  18   122/71   98 


 


 11/27/18 08:07  11/27/18 08:40  11/27/18 08:07  11/27/18 08:40  11/27/18 08:07











- Medications


Medications: 


                               Current Medications





Amlodipine Besylate (Norvasc)  5 mg PO DAILY [facility]


   Last Admin: 11/27/18 08:40 Dose:  Not Given


Enoxaparin Sodium (Lovenox)  40 mg SC DAILY [facility]; Protocol


   Last Admin: 11/27/18 08:41 Dose:  40 mg


Cefazolin Sodium/Dextrose (Ancef Iv 2 Gm Duplex)  2 gm in 50 mls @ 50 mls/hr IVP

B Q8 [facility]; Protocol


   Last Admin: 11/27/18 08:41 Dose:  50 mls/hr


Lactobacillus Acidophilus (Bacid Acidophilus)  1 cap PO BID [facility]


   Last Admin: 11/27/18 08:41 Dose:  1 cap


Levetiracetam (Keppra)  500 mg PO BID [facility]


   Last Admin: 11/27/18 08:41 Dose:  500 mg











- Labs


Labs: 


                                        





                                 11/23/18 05:45 





                                 11/19/18 06:00 





                                        











PT  12.6 Seconds (9.8-13.1)   10/19/18  23:05    


 


INR  1.1   10/19/18  23:05    


 


APTT  36.2 Seconds (25.6-37.1)   10/19/18  23:05    














- Constitutional


Appears: Well, Non-toxic, No Acute Distress





- Head Exam


Head Exam: ATRAUMATIC, NORMOCEPHALIC





- Eye Exam


Eye Exam: EOMI, Normal appearance, PERRL


Pupil Exam: NORMAL ACCOMODATION, PERRL





- ENT Exam


ENT Exam: Mucous Membranes Moist, Normal Exam





- Neck Exam


Neck Exam: Full ROM, Normal Inspection





- Respiratory Exam


Respiratory Exam: Clear to Ausculation Bilateral, NORMAL BREATHING PATTERN





- Cardiovascular Exam


Cardiovascular Exam: REGULAR RHYTHM, RRR





- GI/Abdominal Exam


GI & Abdominal Exam: Soft, Normal Bowel Sounds





- Extremities Exam


Extremities Exam: Normal Capillary Refill


Additional comments: 





R UE PICC line. Dressing C/D/I











- Back Exam


Back Exam: NORMAL INSPECTION





- Neurological Exam


Neurological Exam: Alert, Awake, Oriented x3


Neuro motor strength exam: Left Upper Extremity: 5, Right Upper Extremity: 5, 

Left Lower Extremity: 5, Right Lower Extremity: 5





- Psychiatric Exam


Psychiatric exam: Normal Affect, Normal Mood





- Skin


Skin Exam: Dry, Intact, Normal Color, Warm





Assessment and Plan





- Assessment and Plan (Free Text)


Assessment: 





59 y/o M patient seen and evaluated at the bedside for Endocarditis bacteremia 

with Abx for 6 wk Ancef 2g Q8hr day 37#




















Plan: 








1. Endocarditis Bacteremia 


-Stable


-MARY + for vegetation on AV 


-Blood Culture Negative Final


-Picc line in place on Right arm 


-Will continue ABx by PICC line in Hospital as ID Dr. Dumas- State patient may 

use PICC line for IV abuse. 


-Ancef 2g Q8hr Day #37





2.HTN


-Blood pressure controlled


-Continue Norvasc 5mg QD


-Monitor BP





3. Abnormal LFTs


-Improved, was abnormal due to ETOH vs Chronic Hep C+ 


-MELD score 7


-Hep C ab-reactive


-Hepatitis C- Genotype 1A


-Hep C RNA PCR- positive 


-AST 84, ALT 61 11/15, LFT are trending down


-Abd us-Diffuse increased liver echogenicity compatible with fatty or other 

liver pathology, no masses or dilated ducts 


-HIV neg


-Non immune Hep B, Hep A negative





4. Status Epilepticus due to non compliance of medication and most probably to 

Alcohol withdrawal.


-Resolved


-Neurology consult Dr Mari, recommendations appreciated.


-EEG:Conclusion: Normal EEG 


-Keppra 500mg PO Q12H


-Seizure precaution 


-PT eval and Tx





5. Alcohol Withdrawal


-resolved 


-CIWA score 0, Alc level <10


-Discontinued Librium and Ativan


-Folic Acid and Thiamine PO





6.  DVT prophylaxis


-Lovenox 40mg

## 2018-11-28 RX ADMIN — CEFAZOLIN SODIUM SCH MLS/HR: 2 SOLUTION INTRAVENOUS at 09:10

## 2018-11-28 RX ADMIN — ENOXAPARIN SODIUM SCH MG: 40 INJECTION SUBCUTANEOUS at 09:03

## 2018-11-28 RX ADMIN — Medication SCH CAP: at 09:02

## 2018-11-28 RX ADMIN — CEFAZOLIN SODIUM SCH MLS/HR: 2 SOLUTION INTRAVENOUS at 16:31

## 2018-11-28 RX ADMIN — CEFAZOLIN SODIUM SCH MLS/HR: 2 SOLUTION INTRAVENOUS at 00:18

## 2018-11-28 RX ADMIN — Medication SCH CAP: at 16:31

## 2018-11-28 NOTE — CP.PCM.PN
<HoracioJj - Last Filed: 11/28/18 10:43>





Subjective





- Date & Time of Evaluation


Date of Evaluation: 11/28/18


Time of Evaluation: 10:44





- Subjective


Subjective: 





59 y/o M patient seen and evaluated at the bedside for Endocarditis bacteremia. 

Patient was sitting comfortably in his bed and NAD. Patient states that he 

didn't have any overnight acute events. Patient denies any overnight 

F/N/V/C/C/SOB/CP/Diarrhea.




















Objective





- Vital Signs/Intake and Output


Vital Signs (last 24 hours): 


                                        











Temp Pulse Resp BP Pulse Ox


 


 98.6 F   56 L  20   122/70   98 


 


 11/28/18 08:06  11/28/18 09:03  11/28/18 08:06  11/28/18 09:03  11/28/18 08:06











- Medications


Medications: 


                               Current Medications





Amlodipine Besylate (Norvasc)  5 mg PO DAILY [facility]


   Last Admin: 11/28/18 09:03 Dose:  Not Given


Enoxaparin Sodium (Lovenox)  40 mg SC DAILY [facility]; Protocol


   Last Admin: 11/28/18 09:03 Dose:  40 mg


Cefazolin Sodium/Dextrose (Ancef Iv 2 Gm Duplex)  2 gm in 50 mls @ 50 mls/hr 

IVPB Q8 [facility]; Protocol


   Last Admin: 11/28/18 09:10 Dose:  50 mls/hr


Lactobacillus Acidophilus (Bacid Acidophilus)  1 cap PO BID [facility]


   Last Admin: 11/28/18 09:02 Dose:  1 cap


Levetiracetam (Keppra)  500 mg PO BID [facility]


   Last Admin: 11/28/18 09:02 Dose:  500 mg











- Labs


Labs: 


                                        





                                 11/23/18 05:45 





                                 11/19/18 06:00 





                                        











PT  12.6 Seconds (9.8-13.1)   10/19/18  23:05    


 


INR  1.1   10/19/18  23:05    


 


APTT  36.2 Seconds (25.6-37.1)   10/19/18  23:05    














- Constitutional


Appears: Well, Non-toxic, No Acute Distress





- Head Exam


Head Exam: ATRAUMATIC, NORMOCEPHALIC





- Eye Exam


Eye Exam: EOMI, Normal appearance, PERRL


Pupil Exam: NORMAL ACCOMODATION, PERRL





- ENT Exam


ENT Exam: Mucous Membranes Moist, Normal Exam





- Neck Exam


Neck Exam: Full ROM, Normal Inspection





- Respiratory Exam


Respiratory Exam: Clear to Ausculation Bilateral, NORMAL BREATHING PATTERN





- Cardiovascular Exam


Cardiovascular Exam: REGULAR RHYTHM, RRR





- GI/Abdominal Exam


GI & Abdominal Exam: Soft, Normal Bowel Sounds





- Extremities Exam


Extremities Exam: Normal Capillary Refill


Additional comments: 








R UE PICC line. Dressing C/D/I














- Back Exam


Back Exam: NORMAL INSPECTION





- Neurological Exam


Neurological Exam: Alert, Awake, Oriented x3


Neuro motor strength exam: Left Upper Extremity: 5, Right Upper Extremity: 5, 

Left Lower Extremity: 5, Right Lower Extremity: 5





- Psychiatric Exam


Psychiatric exam: Normal Affect, Normal Mood





- Skin


Skin Exam: Dry, Intact, Normal Color, Warm





Assessment and Plan





- Assessment and Plan (Free Text)


Assessment: 








59 y/o M patient seen and evaluated at the bedside for Endocarditis bacteremia 

with Abx for 6 wk Ancef 2g Q8hr day #38

















Plan: 





1. Endocarditis Bacteremia 


-Stable


-MARY + for vegetation on AV 


-Blood Culture Negative Final


-Picc line in place on Right arm 


-Will continue ABx by PICC line in Hospital as ID Dr. Dumas- State patient may 

use PICC line for IV abuse. 


-Ancef 2g Q8hr Day #38





2.HTN


-Blood pressure controlled


-Continue Norvasc 5mg QD


-Monitor BP





3. Abnormal LFTs


-Improved, was abnormal due to ETOH vs Chronic Hep C+ 


-MELD score 7


-Hep C ab-reactive


-Hepatitis C- Genotype 1A


-Hep C RNA PCR- positive 


-AST 84, ALT 61 11/15, LFT are trending down


-Abd us-Diffuse increased liver echogenicity compatible with fatty or other 

liver pathology, no masses or dilated ducts 


-HIV neg


-Non immune Hep B, Hep A negative





4. Status Epilepticus due to non compliance of medication and most probably to 

Alcohol withdrawal.


-Resolved


-Neurology consult Dr Mari, recommendations appreciated.


-EEG:Conclusion: Normal EEG 


-Keppra 500mg PO Q12H


-Seizure precaution 


-PT eval and Tx





5. Alcohol Withdrawal


-resolved 


-CIWA score 0, Alc level <10


-Folic Acid and Thiamine PO





6.  DVT prophylaxis


-Lovenox 40mg





<Anna Otero - Last Filed: 11/28/18 17:55>





Objective





- Vital Signs/Intake and Output


Vital Signs (last 24 hours): 


                                        











Temp Pulse Resp BP Pulse Ox


 


 97.7 F   73   18   123/75   98 


 


 11/28/18 16:34  11/28/18 16:34  11/28/18 16:34  11/28/18 16:34  11/28/18 16:34











- Medications


Medications: 


                               Current Medications





Amlodipine Besylate (Norvasc)  5 mg PO DAILY [facility]


   Last Admin: 11/28/18 09:03 Dose:  Not Given


Enoxaparin Sodium (Lovenox)  40 mg SC DAILY [facility]; Protocol


   Last Admin: 11/28/18 09:03 Dose:  40 mg


Cefazolin Sodium/Dextrose (Ancef Iv 2 Gm Duplex)  2 gm in 50 mls @ 50 mls/hr 

IVPB Q8 [facility]; Protocol


   Last Admin: 11/28/18 16:31 Dose:  50 mls/hr


Lactobacillus Acidophilus (Bacid Acidophilus)  1 cap PO BID [facility]


   Last Admin: 11/28/18 16:31 Dose:  1 cap


Levetiracetam (Keppra)  500 mg PO BID [facility]


   Last Admin: 11/28/18 16:31 Dose:  500 mg











- Labs


Labs: 


                                        





                                 11/23/18 05:45 





                                 11/19/18 06:00 





                                        











PT  12.6 Seconds (9.8-13.1)   10/19/18  23:05    


 


INR  1.1   10/19/18  23:05    


 


APTT  36.2 Seconds (25.6-37.1)   10/19/18  23:05    














Attending/Attestation





- Attestation


I have personally seen and examined this patient.: Yes


I have fully participated in the care of the patient.: Yes


I have reviewed all pertinent clinical information, including history, physical 

exam and plan: Yes

## 2018-11-29 RX ADMIN — Medication SCH CAP: at 17:40

## 2018-11-29 RX ADMIN — CEFAZOLIN SODIUM SCH MLS/HR: 2 SOLUTION INTRAVENOUS at 00:03

## 2018-11-29 RX ADMIN — CEFAZOLIN SODIUM SCH MLS/HR: 2 SOLUTION INTRAVENOUS at 17:40

## 2018-11-29 RX ADMIN — Medication SCH CAP: at 09:02

## 2018-11-29 RX ADMIN — CEFAZOLIN SODIUM SCH MLS/HR: 2 SOLUTION INTRAVENOUS at 09:03

## 2018-11-29 RX ADMIN — ENOXAPARIN SODIUM SCH MG: 40 INJECTION SUBCUTANEOUS at 09:05

## 2018-11-29 NOTE — CP.PCM.PN
<Jj Leonard - Last Filed: 11/29/18 09:53>





Subjective





- Date & Time of Evaluation


Date of Evaluation: 11/29/18


Time of Evaluation: 09:53





- Subjective


Subjective: 





57 y/o M patient seen and evaluated at the bedside for Endocarditis bacteremia. 

Patient was sitting comfortably in his bed and NAD. Patient states that he 

didn't have any overnight acute events. Patient denies any overnight 

F/N/V/C/C/SOB/CP/Diarrhea.




















Objective





- Vital Signs/Intake and Output


Vital Signs (last 24 hours): 


                                        











Temp Pulse Resp BP Pulse Ox


 


 97.3 F L  60   18   133/68   99 


 


 11/29/18 08:54  11/29/18 09:04  11/29/18 08:54  11/29/18 09:04  11/29/18 08:54











- Medications


Medications: 


                               Current Medications





Amlodipine Besylate (Norvasc)  5 mg PO DAILY [facility]


   Last Admin: 11/29/18 09:04 Dose:  5 mg


Enoxaparin Sodium (Lovenox)  40 mg SC DAILY [facility]; Protocol


   Last Admin: 11/29/18 09:05 Dose:  40 mg


Cefazolin Sodium/Dextrose (Ancef Iv 2 Gm Duplex)  2 gm in 50 mls @ 50 mls/hr 

IVPB Q8 [facility]; Protocol


   Last Admin: 11/29/18 09:03 Dose:  50 mls/hr


Lactobacillus Acidophilus (Bacid Acidophilus)  1 cap PO BID [facility]


   Last Admin: 11/29/18 09:02 Dose:  1 cap


Levetiracetam (Keppra)  500 mg PO BID [facility]


   Last Admin: 11/29/18 09:05 Dose:  500 mg











- Labs


Labs: 


                                        





                                 11/23/18 05:45 





                                 11/19/18 06:00 





                                        











PT  12.6 Seconds (9.8-13.1)   10/19/18  23:05    


 


INR  1.1   10/19/18  23:05    


 


APTT  36.2 Seconds (25.6-37.1)   10/19/18  23:05    














- Constitutional


Appears: Well, Non-toxic, No Acute Distress





- Head Exam


Head Exam: ATRAUMATIC, NORMOCEPHALIC





- Eye Exam


Eye Exam: EOMI, Normal appearance, PERRL


Pupil Exam: NORMAL ACCOMODATION, PERRL





- ENT Exam


ENT Exam: Mucous Membranes Moist, Normal Exam





- Neck Exam


Neck Exam: Full ROM, Normal Inspection





- Respiratory Exam


Respiratory Exam: Clear to Ausculation Bilateral, NORMAL BREATHING PATTERN





- Cardiovascular Exam


Cardiovascular Exam: REGULAR RHYTHM, RRR





- GI/Abdominal Exam


GI & Abdominal Exam: Soft, Normal Bowel Sounds





- Extremities Exam


Extremities Exam: Normal Capillary Refill, Normal Inspection


Additional comments: 








R UE PICC line. Dressing C/D/I























- Back Exam


Back Exam: NORMAL INSPECTION





- Neurological Exam


Neurological Exam: Alert, Awake, Oriented x3


Neuro motor strength exam: Left Upper Extremity: 5, Right Upper Extremity: 5, 

Left Lower Extremity: 5, Right Lower Extremity: 5





- Psychiatric Exam


Psychiatric exam: Normal Affect, Normal Mood





- Skin


Skin Exam: Dry, Intact, Normal Color, Warm





Assessment and Plan





- Assessment and Plan (Free Text)


Assessment: 








57 y/o M patient seen and evaluated at the bedside for Endocarditis bacteremia 

with Abx for 6 wk Ancef 2g Q8hr day #39











Plan: 





1. Endocarditis Bacteremia 


-Stable


-MARY + for vegetation on AV 


-Blood Culture Negative Final


-Picc line in place on Right arm 


-Will continue ABx by PICC line in Hospital as ID Dr. Dumas- State patient may 

use PICC line for IV abuse. 


-Ancef 2g Q8hr Day #39





2.HTN


-Blood pressure controlled


-Continue Norvasc 5mg QD


-Monitor BP





3. Abnormal LFTs


-Improved, was abnormal due to ETOH vs Chronic Hep C+ 


-MELD score 7


-Hep C ab-reactive


-Hepatitis C- Genotype 1A


-Hep C RNA PCR- positive 


-AST 84, ALT 61 11/15, LFT are trending down


-Abd us-Diffuse increased liver echogenicity compatible with fatty or other 

liver pathology, no masses or dilated ducts 


-HIV neg


-Non immune Hep B, Hep A negative





4. Status Epilepticus due to non compliance of medication and most probably to 

Alcohol withdrawal.


-Resolved


-Neurology consult Dr Mari, recommendations appreciated.


-EEG:Conclusion: Normal EEG 


-Keppra 500mg PO Q12H


-Seizure precaution 


-PT eval and Tx





5. Alcohol Withdrawal


-resolved 


-CIWA score 0, Alc level <10


-Folic Acid and Thiamine PO





6.  DVT prophylaxis


-Lovenox 40mg





<Anna Otero - Last Filed: 11/29/18 16:30>





Objective





- Vital Signs/Intake and Output


Vital Signs (last 24 hours): 


                                        











Temp Pulse Resp BP Pulse Ox


 


 98 F   64   18   109/64   96 


 


 11/29/18 16:28  11/29/18 16:28  11/29/18 16:28  11/29/18 16:28  11/29/18 16:28











- Medications


Medications: 


                               Current Medications





Amlodipine Besylate (Norvasc)  5 mg PO DAILY [facility]


   Last Admin: 11/29/18 09:04 Dose:  5 mg


Enoxaparin Sodium (Lovenox)  40 mg SC DAILY [facility]; Protocol


   Last Admin: 11/29/18 09:05 Dose:  40 mg


Cefazolin Sodium/Dextrose (Ancef Iv 2 Gm Duplex)  2 gm in 50 mls @ 50 mls/hr 

IVPB Q8 [facility]; Protocol


   Last Admin: 11/29/18 09:03 Dose:  50 mls/hr


Lactobacillus Acidophilus (Bacid Acidophilus)  1 cap PO BID [facility]


   Last Admin: 11/29/18 09:02 Dose:  1 cap


Levetiracetam (Keppra)  500 mg PO BID [facility]


   Last Admin: 11/29/18 09:05 Dose:  500 mg











- Labs


Labs: 


                                        





                                 11/23/18 05:45 





                                 11/19/18 06:00 





                                        











PT  12.6 Seconds (9.8-13.1)   10/19/18  23:05    


 


INR  1.1   10/19/18  23:05    


 


APTT  36.2 Seconds (25.6-37.1)   10/19/18  23:05    














Attending/Attestation





- Attestation


I have personally seen and examined this patient.: Yes


I have fully participated in the care of the patient.: Yes


I have reviewed all pertinent clinical information, including history, physical 

exam and plan: Yes

## 2018-11-30 VITALS — RESPIRATION RATE: 20 BRPM

## 2018-11-30 LAB
ALBUMIN SERPL-MCNC: 3.6 G/DL (ref 3.5–5)
ALBUMIN/GLOB SERPL: 0.9 {RATIO} (ref 1–2.1)
ALT SERPL-CCNC: 42 U/L (ref 21–72)
AST SERPL-CCNC: 56 U/L (ref 17–59)
BUN SERPL-MCNC: 14 MG/DL (ref 9–20)
CALCIUM SERPL-MCNC: 9.1 MG/DL (ref 8.4–10.2)
ERYTHROCYTE [DISTWIDTH] IN BLOOD BY AUTOMATED COUNT: 12.8 % (ref 11.5–14.5)
GFR NON-AFRICAN AMERICAN: > 60
HGB BLD-MCNC: 12.6 G/DL (ref 12–18)
MCH RBC QN AUTO: 28.7 PG (ref 27–31)
MCHC RBC AUTO-ENTMCNC: 31.7 G/DL (ref 33–37)
MCV RBC AUTO: 90.6 FL (ref 80–94)
PLATELET # BLD: 169 K/UL (ref 130–400)
RBC # BLD AUTO: 4.41 MIL/UL (ref 4.4–5.9)
WBC # BLD AUTO: 5.5 K/UL (ref 4.8–10.8)

## 2018-11-30 RX ADMIN — ENOXAPARIN SODIUM SCH MG: 40 INJECTION SUBCUTANEOUS at 10:37

## 2018-11-30 RX ADMIN — Medication SCH CAP: at 10:37

## 2018-11-30 RX ADMIN — CEFAZOLIN SODIUM SCH MLS/HR: 2 SOLUTION INTRAVENOUS at 00:33

## 2018-11-30 RX ADMIN — CEFAZOLIN SODIUM SCH MLS/HR: 2 SOLUTION INTRAVENOUS at 17:38

## 2018-11-30 RX ADMIN — Medication SCH CAP: at 17:38

## 2018-11-30 RX ADMIN — CEFAZOLIN SODIUM SCH MLS/HR: 2 SOLUTION INTRAVENOUS at 10:36

## 2018-11-30 NOTE — CP.PCM.PN
Subjective





- Date & Time of Evaluation


Date of Evaluation: 11/30/18


Time of Evaluation: 09:22





- Subjective


Subjective: 








57 y/o M patient seen and evaluated at the bedside for Endocarditis bacteremia. 

Patient was sitting comfortably in his bed and NAD. Patient states that he 

didn't have any overnight acute events. Patient denies any overnight F/N/V/C/C/S

OB/CP/Diarrhea.




















Objective





- Vital Signs/Intake and Output


Vital Signs (last 24 hours): 


                                        











Temp Pulse Resp BP Pulse Ox


 


 98.1 F   69   19   109/72   96 


 


 11/30/18 09:16  11/30/18 09:16  11/30/18 09:16  11/30/18 09:16  11/30/18 09:16











- Medications


Medications: 


                               Current Medications





Amlodipine Besylate (Norvasc)  5 mg PO DAILY [facility]


   Last Admin: 11/29/18 09:04 Dose:  5 mg


Enoxaparin Sodium (Lovenox)  40 mg SC DAILY [facility]; Protocol


   Last Admin: 11/29/18 09:05 Dose:  40 mg


Cefazolin Sodium/Dextrose (Ancef Iv 2 Gm Duplex)  2 gm in 50 mls @ 50 mls/hr 

IVPB Q8 [facility]; Protocol


   Last Admin: 11/30/18 00:33 Dose:  50 mls/hr


Lactobacillus Acidophilus (Bacid Acidophilus)  1 cap PO BID [facility]


   Last Admin: 11/29/18 17:40 Dose:  1 cap


Levetiracetam (Keppra)  500 mg PO BID [facility]


   Last Admin: 11/29/18 17:40 Dose:  500 mg











- Labs


Labs: 


                                        





                                 11/30/18 05:20 





                                 11/30/18 05:20 





                                        











PT  12.6 Seconds (9.8-13.1)   10/19/18  23:05    


 


INR  1.1   10/19/18  23:05    


 


APTT  36.2 Seconds (25.6-37.1)   10/19/18  23:05    














- Constitutional


Appears: Well, Non-toxic, No Acute Distress





- Head Exam


Head Exam: ATRAUMATIC, NORMOCEPHALIC





- Eye Exam


Eye Exam: EOMI, Normal appearance, PERRL


Pupil Exam: NORMAL ACCOMODATION, PERRL





- ENT Exam


ENT Exam: Mucous Membranes Moist, Normal Exam





- Neck Exam


Neck Exam: Full ROM, Normal Inspection





- Respiratory Exam


Respiratory Exam: Clear to Ausculation Bilateral, NORMAL BREATHING PATTERN





- Cardiovascular Exam


Cardiovascular Exam: REGULAR RHYTHM, RRR





- GI/Abdominal Exam


GI & Abdominal Exam: Soft, Normal Bowel Sounds





- Extremities Exam


Additional comments: 








R UE PICC line. Dressing C/D/I


























- Back Exam


Back Exam: NORMAL INSPECTION





- Neurological Exam


Neurological Exam: Alert, Awake, Oriented x3


Neuro motor strength exam: Left Upper Extremity: 5, Right Upper Extremity: 5, 

Left Lower Extremity: 5, Right Lower Extremity: 5





- Psychiatric Exam


Psychiatric exam: Normal Affect, Normal Mood





- Skin


Skin Exam: Dry, Intact, Normal Color, Warm





Assessment and Plan





- Assessment and Plan (Free Text)


Assessment: 








57 y/o M patient seen and evaluated at the bedside for Endocarditis bacteremia 

with Abx for 6 wk Ancef 2g Q8hr day #40











Plan: 








1. Endocarditis Bacteremia 


-Stable


-MARY + for vegetation on AV 


-Blood Culture Negative Final


-Picc line in place on Right arm 


-Will continue ABx by PICC line in Hospital as ID Dr. Dumas- State patient may 

use PICC line for IV abuse. 


-Ancef 2g Q8hr Day #40





2.HTN


-Blood pressure controlled


-Continue Norvasc 5mg QD


-Monitor BP





3. Abnormal LFTs


-Improved, was abnormal due to ETOH vs Chronic Hep C+ 


-MELD score 7


-Hep C ab-reactive


-Hepatitis C- Genotype 1A


-Hep C RNA PCR- positive 


-AST 84, ALT 61 11/15, LFT are trending down


-Abd us-Diffuse increased liver echogenicity compatible with fatty or other 

liver pathology, no masses or dilated ducts 


-HIV neg


-Non immune Hep B, Hep A negative





4. Status Epilepticus due to non compliance of medication and most probably to 

Alcohol withdrawal.


-Resolved


-Neurology consult Dr Mari, recommendations appreciated.


-EEG:Conclusion: Normal EEG 


-Keppra 500mg PO Q12H


-Seizure precaution 


-PT eval and Tx





5. Alcohol Withdrawal


-resolved 


-CIWA score 0, Alc level <10


-Folic Acid and Thiamine PO





6.  DVT prophylaxis


-Lovenox 40mg

## 2018-12-01 RX ADMIN — Medication SCH CAP: at 16:16

## 2018-12-01 RX ADMIN — ENOXAPARIN SODIUM SCH MG: 40 INJECTION SUBCUTANEOUS at 08:52

## 2018-12-01 RX ADMIN — CEFAZOLIN SODIUM SCH MLS/HR: 2 SOLUTION INTRAVENOUS at 16:17

## 2018-12-01 RX ADMIN — CEFAZOLIN SODIUM SCH MLS/HR: 2 SOLUTION INTRAVENOUS at 00:19

## 2018-12-01 RX ADMIN — CEFAZOLIN SODIUM SCH MLS/HR: 2 SOLUTION INTRAVENOUS at 08:53

## 2018-12-01 RX ADMIN — Medication SCH CAP: at 08:51

## 2018-12-01 NOTE — CP.PCM.PN
<Lizzie Mandel Saira - Last Filed: 12/01/18 13:44>





Subjective





- Date & Time of Evaluation


Date of Evaluation: 12/01/18


Time of Evaluation: 09:15





- Subjective


Subjective: 





Patient was seen and examined at bedside this morning. Pt feels well. Afebrile. 

No events overnight. On IV Cefazolin as per ID recommendations. 





Objective





- Vital Signs/Intake and Output


Vital Signs (last 24 hours): 


                                        











Temp Pulse Resp BP Pulse Ox


 


 98.2 F   46 L  20   113/55 L  97 


 


 12/01/18 08:19  12/01/18 08:19  12/01/18 08:19  12/01/18 08:19  12/01/18 08:19











- Medications


Medications: 


                               Current Medications





Amlodipine Besylate (Norvasc)  5 mg PO DAILY [facility]


   Last Admin: 12/01/18 08:52 Dose:  5 mg


Enoxaparin Sodium (Lovenox)  40 mg SC DAILY [facility]; Protocol


   Last Admin: 12/01/18 08:52 Dose:  40 mg


Cefazolin Sodium/Dextrose (Ancef Iv 2 Gm Duplex)  2 gm in 50 mls @ 50 mls/hr 

IVPB Q8 [facility]; Protocol


   Last Admin: 12/01/18 08:53 Dose:  50 mls/hr


Lactobacillus Acidophilus (Bacid Acidophilus)  1 cap PO BID [facility]


   Last Admin: 12/01/18 08:51 Dose:  1 cap


Levetiracetam (Keppra)  500 mg PO BID [facility]


   Last Admin: 12/01/18 08:52 Dose:  500 mg











- Labs


Labs: 


                                        





                                 11/30/18 05:20 





                                 11/30/18 05:20 





                                        











PT  12.6 Seconds (9.8-13.1)   10/19/18  23:05    


 


INR  1.1   10/19/18  23:05    


 


APTT  36.2 Seconds (25.6-37.1)   10/19/18  23:05    














- Skin


Additional comments: 


Constitutional


Appears: Well, Non-toxic, No Acute Distress





- Head Exam


Head Exam: ATRAUMATIC, NORMOCEPHALIC





- Eye Exam


Eye Exam: EOMI, Normal appearance, PERRL


Pupil Exam: NORMAL ACCOMODATION, PERRL





- ENT Exam


ENT Exam: Mucous Membranes Moist, Normal Exam





- Neck Exam


Neck Exam: Full ROM, Normal Inspection





- Respiratory Exam


Respiratory Exam: Clear to Ausculation Bilateral, NORMAL BREATHING PATTERN





- Cardiovascular Exam


Cardiovascular Exam: REGULAR RHYTHM, RRR





- GI/Abdominal Exam


GI & Abdominal Exam: Soft, Normal Bowel Sounds





- Extremities Exam


Additional comments: R UE PICC line. Dressing C/D/I





Back Exam


Back Exam: NORMAL INSPECTION





- Neurological Exam


Neurological Exam: Alert, Awake, Oriented x3


Neuro motor strength exam: Left Upper Extremity: 5, Right Upper Extremity: 5, 

Left Lower Extremity: 5, Right Lower Extremity: 5





- Psychiatric Exam


Psychiatric exam: Normal Affect, Normal Mood





- Skin


Skin Exam: Dry, Intact, Normal Color, Warm








Assessment and Plan





- Assessment and Plan (Free Text)


Assessment: 


59 y/o M patient seen and evaluated at the bedside for Endocarditis bacteremia 

with Abx for 6 wk Ancef 2g Q8hr day #41














Plan: 


1. Endocarditis Bacteremia 


-Stable


-MARY + for vegetation on AV 


-Blood Culture Negative Final


-Picc line in place on Right arm 


-Will continue ABx by PICC line in Hospital as ID Dr. Dumas- State patient may 

use PICC line for IV abuse. 


-Ancef 2g Q8hr Day #41





2.HTN


-Blood pressure controlled


-Continue Norvasc 5mg QD


-Monitor BP





3. Chronic Hepatitis C, and transaminitis


Abnormal LFTs on admission. Normal LFTs today


-MELD score 7


-Hep C ab-reactive


-Hepatitis C- Genotype 1A


-Hep C RNA PCR- positive 


-AST 84, ALT 61 11/15, LFT are trending down


-Abd us-Diffuse increased liver echogenicity compatible with fatty or other 

liver pathology, no masses or dilated ducts 


-HIV neg


-Hep B, Hep A negative





4. Status Epilepticus due to non compliance of medication and most probably to 

Alcohol withdrawal.


-Resolved


-Neurology consult Dr Mari, recommendations appreciated.


-EEG:Conclusion: Normal EEG 


-Keppra 500mg PO Q12H


-Seizure precaution 


-PT eval and Tx





5. Alcohol Withdrawal


-resolved 


-CIWA score 0, Alc level <10


-Folic Acid and Thiamine PO





6.  DVT prophylaxis


-Lovenox 40mg





<Jazmín Campos - Last Filed: 12/02/18 09:23>





Objective





- Vital Signs/Intake and Output


Vital Signs (last 24 hours): 


                                        











Temp Pulse Resp BP Pulse Ox


 


 98.3 F   59 L  20   99/56 L  96 


 


 12/02/18 08:13  12/02/18 08:13  12/02/18 08:13  12/02/18 08:13  12/02/18 08:13











- Medications


Medications: 


                               Current Medications





Amlodipine Besylate (Norvasc)  5 mg PO DAILY [facility]


   Last Admin: 12/01/18 08:52 Dose:  5 mg


Enoxaparin Sodium (Lovenox)  40 mg SC DAILY [facility]; Protocol


   Last Admin: 12/01/18 08:52 Dose:  40 mg


Cefazolin Sodium/Dextrose (Ancef Iv 2 Gm Duplex)  2 gm in 50 mls @ 50 mls/hr 

IVPB Q8 [facility]; Protocol


   Last Admin: 12/02/18 01:59 Dose:  50 mls/hr


Lactobacillus Acidophilus (Bacid Acidophilus)  1 cap PO BID [facility]


   Last Admin: 12/01/18 16:16 Dose:  1 cap


Levetiracetam (Keppra)  500 mg PO BID [facility]


   Last Admin: 12/01/18 16:16 Dose:  500 mg











- Labs


Labs: 


                                        





                                 11/30/18 05:20 





                                 11/30/18 05:20 





                                        











PT  12.6 Seconds (9.8-13.1)   10/19/18  23:05    


 


INR  1.1   10/19/18  23:05    


 


APTT  36.2 Seconds (25.6-37.1)   10/19/18  23:05    














Attending/Attestation





- Attestation


I have personally seen and examined this patient.: Yes


I have fully participated in the care of the patient.: Yes


I have reviewed all pertinent clinical information, including history, physical 

exam and plan: Yes


Notes (Text): 





12/02/18 09:23


Seen examined and discussed with resident. 


Agree with findings and plan as above.





No changes, cont abx

## 2018-12-02 VITALS
OXYGEN SATURATION: 97 % | SYSTOLIC BLOOD PRESSURE: 136 MMHG | HEART RATE: 61 BPM | DIASTOLIC BLOOD PRESSURE: 78 MMHG | TEMPERATURE: 97.7 F

## 2018-12-02 RX ADMIN — Medication SCH CAP: at 09:23

## 2018-12-02 RX ADMIN — Medication SCH CAP: at 16:07

## 2018-12-02 RX ADMIN — CEFAZOLIN SODIUM SCH MLS/HR: 2 SOLUTION INTRAVENOUS at 01:59

## 2018-12-02 RX ADMIN — CEFAZOLIN SODIUM SCH MLS/HR: 2 SOLUTION INTRAVENOUS at 16:03

## 2018-12-02 RX ADMIN — ENOXAPARIN SODIUM SCH MG: 40 INJECTION SUBCUTANEOUS at 09:17

## 2018-12-02 RX ADMIN — CEFAZOLIN SODIUM SCH MLS/HR: 2 SOLUTION INTRAVENOUS at 09:16

## 2018-12-02 NOTE — CP.PCM.PN
<Tavia Ferguson - Last Filed: 12/02/18 13:42>





Subjective





- Date & Time of Evaluation


Date of Evaluation: 12/02/18


Time of Evaluation: 08:30





- Subjective


Subjective: 


Patient seen and examined at bedside.


Charts, labs, and nurse notes reviewed.


No acute events over night. 


Patient tolerating PO diet. Denies n/v. 





Objective





- Vital Signs/Intake and Output


Vital Signs (last 24 hours): 


                                        











Temp Pulse Resp BP Pulse Ox


 


 98.3 F   78   20   111/72   96 


 


 12/02/18 08:13  12/02/18 09:17  12/02/18 08:13  12/02/18 09:17  12/02/18 08:13











- Medications


Medications: 


                               Current Medications





Amlodipine Besylate (Norvasc)  5 mg PO DAILY [facility]


   Last Admin: 12/02/18 09:17 Dose:  5 mg


Enoxaparin Sodium (Lovenox)  40 mg SC DAILY [facility]; Protocol


   Last Admin: 12/02/18 09:17 Dose:  40 mg


Cefazolin Sodium/Dextrose (Ancef Iv 2 Gm Duplex)  2 gm in 50 mls @ 50 mls/hr 

IVPB Q8 [facility]; Protocol


   Last Admin: 12/02/18 09:16 Dose:  50 mls/hr


Lactobacillus Acidophilus (Bacid Acidophilus)  1 cap PO BID [facility]


   Last Admin: 12/02/18 09:23 Dose:  1 cap


Levetiracetam (Keppra)  500 mg PO BID [facility]


   Last Admin: 12/02/18 09:17 Dose:  500 mg











- Labs


Labs: 


                                        





                                 11/30/18 05:20 





                                 11/30/18 05:20 





                                        











PT  12.6 Seconds (9.8-13.1)   10/19/18  23:05    


 


INR  1.1   10/19/18  23:05    


 


APTT  36.2 Seconds (25.6-37.1)   10/19/18  23:05    














- Constitutional


Appears: No Acute Distress





- Respiratory Exam


Respiratory Exam: Clear to Ausculation Bilateral, NORMAL BREATHING PATTERN





- Cardiovascular Exam


Cardiovascular Exam: REGULAR RHYTHM, +S1, +S2





- GI/Abdominal Exam


GI & Abdominal Exam: Soft, Normal Bowel Sounds





- Extremities Exam


Extremities Exam: Normal Inspection.  absent: Calf Tenderness, Pedal Edema





- Neurological Exam


Neurological Exam: Alert, Awake





- Psychiatric Exam


Psychiatric exam: Normal Affect





- Skin


Skin Exam: Dry, Intact, Warm





Assessment and Plan





- Assessment and Plan (Free Text)


Assessment: 


Assessment: 


59 y/o male who was admitted for endocarditis bacteremia w/ plans to continue IV

abx for 6 wk Ancef 2g Q8hr day #42





Plan: 


1. Endocarditis Bacteremia 


-Stable


-MARY + for vegetation on AV 


-Blood Culture Negative Final


-Picc line in place on Right arm 


-Will continue ABx by PICC line in Hospital as ID Dr. Dumas- State patient may 

use PICC line for IV abuse. 


-Ancef 2g Q8hr Day #42





2.HTN


-Blood pressure controlled


-Continue Norvasc 5mg QD


-Monitor BP





3. Chronic Hepatitis C, and transaminitis


Abnormal LFTs on admission. Normal LFTs today


-MELD score 7


-Hep C ab-reactive


-Hepatitis C- Genotype 1A


-Hep C RNA PCR- positive 


-AST 84, ALT 61 11/15, LFT are trending down


-Abd us-Diffuse increased liver echogenicity compatible with fatty or other 

liver pathology, no masses or dilated ducts 


-HIV neg


-Hep B, Hep A negative





4. Status Epilepticus due to non compliance of medication and most probably to 

Alcohol withdrawal.


-Resolved


-Neurology consult Dr Mari, recommendations appreciated.


-EEG:Conclusion: Normal EEG 


-Keppra 500mg PO Q12H


-Seizure precaution 


-PT eval and Tx





5. Alcohol Withdrawal


-resolved 


-CIWA score 0, Alc level <10


-Folic Acid and Thiamine PO





6.  DVT prophylaxis


-Lovenox 40mg








<Jazmín Campos - Last Filed: 12/02/18 15:27>





Objective





- Vital Signs/Intake and Output


Vital Signs (last 24 hours): 


                                        











Temp Pulse Resp BP Pulse Ox


 


 98.3 F   78   20   111/72   96 


 


 12/02/18 08:13  12/02/18 09:17  12/02/18 08:13  12/02/18 09:17  12/02/18 08:13











- Medications


Medications: 


                               Current Medications





Amlodipine Besylate (Norvasc)  5 mg PO DAILY [facility]


   Last Admin: 12/02/18 09:17 Dose:  5 mg


Enoxaparin Sodium (Lovenox)  40 mg SC DAILY [facility]; Protocol


   Last Admin: 12/02/18 09:17 Dose:  40 mg


Cefazolin Sodium/Dextrose (Ancef Iv 2 Gm Duplex)  2 gm in 50 mls @ 50 mls/hr 

IVPB Q8 [facility]; Protocol


   Last Admin: 12/02/18 09:16 Dose:  50 mls/hr


Lactobacillus Acidophilus (Bacid Acidophilus)  1 cap PO BID [facility]


   Last Admin: 12/02/18 09:23 Dose:  1 cap


Levetiracetam (Keppra)  500 mg PO BID [facility]


   Last Admin: 12/02/18 09:17 Dose:  500 mg











- Labs


Labs: 


                                        





                                 11/30/18 05:20 





                                 11/30/18 05:20 





                                        











PT  12.6 Seconds (9.8-13.1)   10/19/18  23:05    


 


INR  1.1   10/19/18  23:05    


 


APTT  36.2 Seconds (25.6-37.1)   10/19/18  23:05    














Attending/Attestation





- Attestation


I have personally seen and examined this patient.: Yes


I have fully participated in the care of the patient.: Yes


I have reviewed all pertinent clinical information, including history, physical 

exam and plan: Yes


Notes (Text): 





12/02/18 15:26


Seen examined and discussed with resident. 


Agree with findings and plan as above.





Paitent to be discharged after last dose of abx today.

## 2019-01-18 ENCOUNTER — HOSPITAL ENCOUNTER (EMERGENCY)
Dept: HOSPITAL 14 - H.ER | Age: 59
Discharge: HOME | End: 2019-01-18
Payer: MEDICAID

## 2019-01-18 VITALS
OXYGEN SATURATION: 99 % | TEMPERATURE: 97.8 F | RESPIRATION RATE: 18 BRPM | HEART RATE: 68 BPM | SYSTOLIC BLOOD PRESSURE: 129 MMHG | DIASTOLIC BLOOD PRESSURE: 68 MMHG

## 2019-01-18 VITALS — BODY MASS INDEX: 24.7 KG/M2

## 2019-01-18 DIAGNOSIS — I10: ICD-10-CM

## 2019-01-18 DIAGNOSIS — Z76.0: Primary | ICD-10-CM

## 2019-01-18 DIAGNOSIS — G40.909: ICD-10-CM

## 2019-01-18 NOTE — ED PDOC
HPI: General Adult


Time Seen by Provider: 19 13:15


Chief Complaint (Nursing): Med Refill


Chief Complaint (Provider): Med Refill


History Per: Patient


History/Exam Limitations: no limitations


Severity: Mild


Additional Complaint(s): 





58 year old male with a past medical history of seizures and hypertension 

presents to the ED for a medication refill of folic acid, norvasc, and keppra. 

Patient reports that he does not have a PMD right now, and was unable to make an

appointment at the clinic until 2019, prompting ED visit today. Patient 

denies having any complaints.





PMD: None.





Past Medical History


Reviewed: Historical Data, Nursing Documentation, Vital Signs


Vital Signs: 





                                Last Vital Signs











Temp  97.8 F   19 12:43


 


Pulse  68   19 12:43


 


Resp  18   19 12:43


 


BP  129/68   19 12:43


 


Pulse Ox  99   19 12:43











HENRIK Report Viewed: Yes





- Medical History


PMH: HTN, Seizures


   Denies: Deep Vein Thrombosis, Chronic Kidney Disease





- Surgical History


Surgical History: 


   Denies: Pacemaker





- Family History


Family History: States: No Known Family Hx





- Social History


Alcohol: None


Drugs: Denies





- Home Medications


Home Medications: 


                                Ambulatory Orders











 Medication  Instructions  Recorded


 


Folic Acid 1 mg PO DAILY #30 tab 18


 


Lactobacillus Acidophilus [Bacid 1 cap PO BID 7 Days #14 cap 18





Acidophilus]  


 


Thiamine [Vitamin B1 Tab] 100 mg PO DAILY #30 tab 18


 


amLODIPine [Norvasc] 5 mg PO DAILY 30 Days #30 tab 18


 


levETIRAcetam [Keppra] 500 mg PO BID 30 Days #60 tab 18


 


Folic Acid 1 mg PO DAILY #30 tab 19


 


Levetiracetam [Keppra] 500 mg PO BID #60 tablet 19


 


amLODIPine [Norvasc] 5 mg PO DAILY #30 tab 19














- Allergies


Allergies/Adverse Reactions: 


                                    Allergies











Allergy/AdvReac Type Severity Reaction Status Date / Time


 


No Known Allergies Allergy  N Verified 19 12:43














Review of Systems


ROS Statement: Except As Marked, All Systems Reviewed And Found Negative





Physical Exam





- Reviewed


Nursing Documentation Reviewed: Yes


Vital Signs Reviewed: Yes





- Physical Exam


Appears: Positive for: Well, Non-toxic, No Acute Distress


Head Exam: Positive for: ATRAUMATIC, NORMOCEPHALIC


Skin: Positive for: Normal Color, Warm, Dry


Eye Exam: Positive for: Normal appearance


Neurologic/Psych: Positive for: Alert, Oriented (3x)





- ECG


O2 Sat by Pulse Oximetry: 99 (RA)


Pulse Ox Interpretation: Normal





Medical Decision Making


Medical Decision Makin:15


Initial impression: 58 year old male in the ED for a medication refill.


Initial plan:


Patient will be discharged home with an Rx for folic acid, norvasc, and keppra 

with instructions to follow up with the Clinic. Return instructions provided.





 

--------------------------------------------------------------------------------


-----------------


Scribe Attestation:


Documented Danna Green, acting as a scribe for Homero Rhodes PA-C.





Provider Scribe Attestation:


All medical record entries made by the Scribe were at my direction and 

personally dictated by me. I have reviewed the chart and agree that the record 

accurately reflects my personal performance of the history, physical exam, 

medical decision making, and the department course for this patient. I have also

 personally directed, reviewed, and agree with the discharge instructions and 

disposition.





Disposition





- Clinical Impression


Clinical Impression: 


 Hypertension, Epilepsy, Medication refill








- Patient ED Disposition


Is Patient to be Admitted: No





- Disposition


Disposition: Routine/Home


Disposition Time: 13:20


Condition: FAIR


Prescriptions: 


amLODIPine [Norvasc] 5 mg PO DAILY #30 tab


Folic Acid 1 mg PO DAILY #30 tab


Levetiracetam [Keppra] 500 mg PO BID #60 tablet


Instructions:  High Blood Pressure in Adults

## 2025-01-15 NOTE — CP.PCM.PN
<Gris Cazares - Last Filed: 11/03/18 10:12>





Subjective





- Date & Time of Evaluation


Date of Evaluation: 11/03/18


Time of Evaluation: 09:00





- Subjective


Subjective: 








Patient seen and examined this morning; was sitting up in bed and eating 

breakfast, has good appetite, no acute distress or complaints. Continues to 

receive antibiotics (started on 10/22, today is day 13). 











Objective





- Vital Signs/Intake and Output


Vital Signs (last 24 hours): 


                                        











Temp Pulse Resp BP Pulse Ox


 


 98.4 F   49 L  18   138/69   97 


 


 11/03/18 08:26  11/03/18 08:26  11/03/18 08:26  11/03/18 08:26  11/03/18 08:26











- Medications


Medications: 


                               Current Medications





Acetaminophen (Tylenol 650 Mg Supp)  650 mg MN Q4 PRN


   PRN Reason: Fever >100.4 F


   Last Admin: 10/20/18 04:35 Dose:  650 mg


Amlodipine Besylate (Norvasc)  5 mg PO DAILY [facility]


   Last Admin: 11/03/18 08:22 Dose:  5 mg


Folic Acid (Folic Acid)  1 mg PO DAILY [facility]


   Last Admin: 11/03/18 08:21 Dose:  1 mg


Cefazolin Sodium 2 gm/ Sodium (Chloride)  100 mls @ 100 mls/hr IVPB Q8 [facility]; Pr

otocol


   Last Admin: 11/03/18 08:20 Dose:  100 mls/hr


Levetiracetam (Keppra)  500 mg PO BID [facility]


   Last Admin: 11/03/18 08:21 Dose:  500 mg


Multivitamins/Minerals (Therapeutic-M Tab)  1 tab PO DAILY [facility]


   Last Admin: 11/03/18 08:22 Dose:  1 tab


Thiamine HCl (Vitamin B1 Tab)  100 mg PO DAILY [facility]


   Last Admin: 11/03/18 08:22 Dose:  100 mg











- Labs


Labs: 


                                        





                                 11/02/18 04:25 





                                 11/02/18 04:25 





                                        











PT  12.6 Seconds (9.8-13.1)   10/19/18  23:05    


 


INR  1.1   10/19/18  23:05    


 


APTT  36.2 Seconds (25.6-37.1)   10/19/18  23:05    














- Constitutional


Appears: No Acute Distress





- Head Exam


Head Exam: NORMAL INSPECTION





- Respiratory Exam


Respiratory Exam: Clear to Ausculation Bilateral, NORMAL BREATHING PATTERN





- Cardiovascular Exam


Cardiovascular Exam: REGULAR RHYTHM, +S1, +S2





- GI/Abdominal Exam


GI & Abdominal Exam: Soft, Normal Bowel Sounds





- Extremities Exam


Extremities Exam: Normal Inspection.  absent: Calf Tenderness





- Neurological Exam


Neurological Exam: Alert





- Skin


Skin Exam: Dry, Warm





Assessment and Plan





- Assessment and Plan (Free Text)


Assessment: 





57 yo M with hx of non-compliance with medications, history of alcohol abuse and

seizures, brought to the ED with recurrent witnessed seizures, alcohol with

drawal and AMS. He was found to be febrile on admission and blood cultures 

showed bacteremia + with MSSA. Seizures and withdrawal have resolved, EEG read 

as normal. MARY showed aortic valve vegetation and cefazolin was started 2g IV 

Q8h via PICC on 10/22; will need 4-6 weeks of IV antibiotics but cannot be d/c 

home with PICC due to hx IV drug abuse. Pending insurance re-instatement so that

he can be d/c to [facility] to complete antibiotics course. At this time, he is stable,

pending transfer to med-surg unit. 


Plan: 





1. Endocarditis Bacteremia 


-MARY + for vegetation on AV 


-blood cx- MSSA, repeat BC negative 


-Picc line in place on right arm 


-ID consult- Dr. Dumas- pt cant be d/c home with picc due to risk from hx of IV

drug abuse; will need 4-6 weeks of IV antibiotics


-Cefazolin 2g Q8hr, today is day #13


-Cardiologist consult Dr. Barnes, recommendations appreciated








 2. Status Epilepticus due to non compliance and likely alcohol withdrawal.


-Resolved


-Neurology consult Dr Mari, recommendations appreciated.


-EEG: Conclusion: Normal EEG (Awake, Drowsy, and Asleep)


-Keppra 500mg PO Q12H


-Seizure precaution 


-PT eval and Tx





 3. Alcohol Withdrawal


-Resolved 


-CIWA score 0, Alc level <10


-Librium and Ativan have been discontinued


-Folic Acid and Thiamine PO





 4. Transaminitis


-MELD score 7


-Hep C ab-reactive


-Hepatitis C- Genotype 1A


-Hep C RNA PCR- pending results 


-,  11/2, LFT are trending down


-Abd us-Diffuse increased liver echogenicity compatible with fatty or other 

liver pathology, no masses or dilated ducts 


-HIV neg


-Non immune Hep B, Hep A negative





5. Hypokalemia


-Resolved





6. HTN


-Norvasc 5mg QD


-Well controlled at this time, continue to monitor





7. DVT prophylaxis


- Lovenox 40mg SC daily





8. Code Status


- Full





<Anna Otero - Last Filed: 11/03/18 15:32>





Objective





- Vital Signs/Intake and Output


Vital Signs (last 24 hours): 


                                        











Temp Pulse Resp BP Pulse Ox


 


 97.8 F   58 L  20   107/62   98 


 


 11/03/18 12:47  11/03/18 12:47  11/03/18 12:47  11/03/18 12:47  11/03/18 12:47











- Medications


Medications: 


                               Current Medications





Acetaminophen (Tylenol 650 Mg Supp)  650 mg MN Q4 PRN


   PRN Reason: Fever >100.4 F


   Last Admin: 10/20/18 04:35 Dose:  650 mg


Amlodipine Besylate (Norvasc)  5 mg PO DAILY [facility]


   Last Admin: 11/03/18 08:22 Dose:  5 mg


Enoxaparin Sodium (Lovenox)  40 mg SC DAILY [facility]; Protocol


Folic Acid (Folic Acid)  1 mg PO DAILY [facility]


   Last Admin: 11/03/18 08:21 Dose:  1 mg


Cefazolin Sodium 2 gm/ Sodium (Chloride)  100 mls @ 100 mls/hr IVPB Q8 [facility]; 

Protocol


   Last Admin: 11/03/18 08:20 Dose:  100 mls/hr


Levetiracetam (Keppra)  500 mg PO BID [facility]


   Last Admin: 11/03/18 08:21 Dose:  500 mg


Multivitamins/Minerals (Therapeutic-M Tab)  1 tab PO DAILY [facility]


   Last Admin: 11/03/18 08:22 Dose:  1 tab


Thiamine HCl (Vitamin B1 Tab)  100 mg PO DAILY [facility]


   Last Admin: 11/03/18 08:22 Dose:  100 mg











- Labs


Labs: 


                                        





                                 11/02/18 04:25 





                                 11/02/18 04:25 





                                        











PT  12.6 Seconds (9.8-13.1)   10/19/18  23:05    


 


INR  1.1   10/19/18  23:05    


 


APTT  36.2 Seconds (25.6-37.1)   10/19/18  23:05    














Attending/Attestation





- Attestation


I have personally seen and examined this patient.: Yes


I have fully participated in the care of the patient.: Yes


I have reviewed all pertinent clinical information, including history, physical 

exam and plan: Yes Detail Level: Generalized Detail Level: Zone Detail Level: Detailed